# Patient Record
Sex: MALE | Race: WHITE | Employment: FULL TIME | URBAN - METROPOLITAN AREA
[De-identification: names, ages, dates, MRNs, and addresses within clinical notes are randomized per-mention and may not be internally consistent; named-entity substitution may affect disease eponyms.]

---

## 2018-12-20 ENCOUNTER — OFFICE VISIT (OUTPATIENT)
Dept: ORTHOPEDIC SURGERY | Age: 53
End: 2018-12-20
Payer: COMMERCIAL

## 2018-12-20 VITALS
HEIGHT: 71 IN | WEIGHT: 275 LBS | SYSTOLIC BLOOD PRESSURE: 75 MMHG | BODY MASS INDEX: 38.5 KG/M2 | DIASTOLIC BLOOD PRESSURE: 44 MMHG | HEART RATE: 93 BPM

## 2018-12-20 DIAGNOSIS — M25.561 CHRONIC PAIN OF RIGHT KNEE: Primary | ICD-10-CM

## 2018-12-20 DIAGNOSIS — G89.29 CHRONIC PAIN OF LEFT KNEE: ICD-10-CM

## 2018-12-20 DIAGNOSIS — M25.562 CHRONIC PAIN OF LEFT KNEE: ICD-10-CM

## 2018-12-20 DIAGNOSIS — G89.29 CHRONIC PAIN OF RIGHT KNEE: Primary | ICD-10-CM

## 2018-12-20 PROCEDURE — 99203 OFFICE O/P NEW LOW 30 MIN: CPT | Performed by: ORTHOPAEDIC SURGERY

## 2018-12-20 PROCEDURE — 20610 DRAIN/INJ JOINT/BURSA W/O US: CPT | Performed by: ORTHOPAEDIC SURGERY

## 2018-12-20 RX ORDER — HYDROCHLOROTHIAZIDE 25 MG/1
25 TABLET ORAL
Refills: 5 | COMMUNITY
Start: 2018-12-06

## 2018-12-20 RX ORDER — OMEPRAZOLE 40 MG/1
40 CAPSULE, DELAYED RELEASE ORAL DAILY
Refills: 5 | COMMUNITY
Start: 2018-12-06

## 2018-12-20 RX ORDER — LOSARTAN POTASSIUM 100 MG/1
100 TABLET ORAL DAILY
Refills: 0 | COMMUNITY
Start: 2018-11-05

## 2018-12-20 RX ORDER — ETODOLAC 400 MG/1
400 TABLET, FILM COATED ORAL 2 TIMES DAILY
Qty: 60 TABLET | Refills: 2 | Status: SHIPPED | OUTPATIENT
Start: 2018-12-20 | End: 2019-04-18 | Stop reason: SDUPTHER

## 2018-12-20 RX ORDER — ETODOLAC 400 MG/1
400 TABLET, FILM COATED ORAL 2 TIMES DAILY
COMMUNITY
End: 2018-12-20 | Stop reason: SDUPTHER

## 2018-12-20 RX ORDER — TRAMADOL HYDROCHLORIDE 50 MG/1
50 TABLET ORAL EVERY 6 HOURS PRN
Qty: 42 TABLET | Refills: 0 | Status: SHIPPED | OUTPATIENT
Start: 2018-12-20 | End: 2018-12-27

## 2018-12-20 RX ORDER — TRAMADOL HYDROCHLORIDE 50 MG/1
50 TABLET ORAL EVERY 6 HOURS PRN
COMMUNITY
End: 2018-12-20 | Stop reason: SDUPTHER

## 2019-04-18 DIAGNOSIS — M25.562 CHRONIC PAIN OF LEFT KNEE: ICD-10-CM

## 2019-04-18 DIAGNOSIS — M25.561 CHRONIC PAIN OF RIGHT KNEE: Primary | ICD-10-CM

## 2019-04-18 DIAGNOSIS — G89.29 CHRONIC PAIN OF LEFT KNEE: ICD-10-CM

## 2019-04-18 DIAGNOSIS — G89.29 CHRONIC PAIN OF RIGHT KNEE: Primary | ICD-10-CM

## 2019-04-18 RX ORDER — TRAMADOL HYDROCHLORIDE 50 MG/1
TABLET ORAL
Qty: 42 TABLET | Refills: 0 | Status: SHIPPED | OUTPATIENT
Start: 2019-04-18 | End: 2019-05-31 | Stop reason: SDUPTHER

## 2019-04-18 RX ORDER — ETODOLAC 400 MG/1
TABLET, FILM COATED ORAL
Qty: 60 TABLET | Refills: 0 | Status: SHIPPED | OUTPATIENT
Start: 2019-04-18 | End: 2019-05-17 | Stop reason: SDUPTHER

## 2019-05-20 RX ORDER — ETODOLAC 400 MG/1
TABLET, FILM COATED ORAL
Qty: 60 TABLET | Refills: 0 | Status: SHIPPED | OUTPATIENT
Start: 2019-05-20 | End: 2019-06-16 | Stop reason: SDUPTHER

## 2019-05-31 DIAGNOSIS — M25.561 CHRONIC PAIN OF RIGHT KNEE: ICD-10-CM

## 2019-05-31 DIAGNOSIS — G89.29 CHRONIC PAIN OF LEFT KNEE: ICD-10-CM

## 2019-05-31 DIAGNOSIS — G89.29 CHRONIC PAIN OF RIGHT KNEE: ICD-10-CM

## 2019-05-31 DIAGNOSIS — M25.562 CHRONIC PAIN OF LEFT KNEE: ICD-10-CM

## 2019-06-03 RX ORDER — TRAMADOL HYDROCHLORIDE 50 MG/1
TABLET ORAL
Qty: 42 TABLET | Refills: 0 | Status: SHIPPED | OUTPATIENT
Start: 2019-06-03 | End: 2019-11-27 | Stop reason: SDUPTHER

## 2019-06-17 RX ORDER — ETODOLAC 400 MG/1
TABLET, FILM COATED ORAL
Qty: 60 TABLET | Refills: 0 | Status: SHIPPED | OUTPATIENT
Start: 2019-06-17 | End: 2020-04-09

## 2019-07-10 ENCOUNTER — TELEPHONE (OUTPATIENT)
Dept: ORTHOPEDIC SURGERY | Age: 54
End: 2019-07-10

## 2019-07-23 ENCOUNTER — OFFICE VISIT (OUTPATIENT)
Dept: ORTHOPEDIC SURGERY | Age: 54
End: 2019-07-23
Payer: COMMERCIAL

## 2019-07-23 VITALS
DIASTOLIC BLOOD PRESSURE: 95 MMHG | BODY MASS INDEX: 38.49 KG/M2 | HEIGHT: 71 IN | WEIGHT: 274.91 LBS | SYSTOLIC BLOOD PRESSURE: 150 MMHG | HEART RATE: 79 BPM

## 2019-07-23 DIAGNOSIS — M25.562 CHRONIC PAIN OF LEFT KNEE: ICD-10-CM

## 2019-07-23 DIAGNOSIS — G89.29 CHRONIC PAIN OF RIGHT KNEE: Primary | ICD-10-CM

## 2019-07-23 DIAGNOSIS — M25.561 CHRONIC PAIN OF RIGHT KNEE: Primary | ICD-10-CM

## 2019-07-23 DIAGNOSIS — M76.61 ACHILLES BURSITIS OF RIGHT LOWER EXTREMITY: ICD-10-CM

## 2019-07-23 DIAGNOSIS — G89.29 CHRONIC PAIN OF LEFT KNEE: ICD-10-CM

## 2019-07-23 PROCEDURE — 99214 OFFICE O/P EST MOD 30 MIN: CPT | Performed by: ORTHOPAEDIC SURGERY

## 2019-07-23 PROCEDURE — 20610 DRAIN/INJ JOINT/BURSA W/O US: CPT | Performed by: ORTHOPAEDIC SURGERY

## 2019-07-23 RX ORDER — HYDROCODONE BITARTRATE AND ACETAMINOPHEN 5; 325 MG/1; MG/1
1 TABLET ORAL EVERY 6 HOURS PRN
Qty: 28 TABLET | Refills: 0 | Status: SHIPPED | OUTPATIENT
Start: 2019-07-23 | End: 2019-07-30

## 2019-07-23 NOTE — PROGRESS NOTES
Patient Name: Matthew Fuller  : 1965  DOS: 2019        Chief Complaint:   Chief Complaint   Patient presents with    Knee Pain     Bilateral knee   Currently:     Pain in the bialteral knees with return on doing his 15,000 step shows. Flare up in the past several weeks but decreased currently. Pain better now. Limited relief with medications including lodine and ultram.   Achilles tendintis with fasciits using a boot currently. Previously:          History of Present Illness:  Matthew Fuller is a 46 y.o. male who presents with a chief complaint of continued complaints of pain in the knee with irritation with walking, stairs and with overuse. Pain in the knee regularly with swelling and discomfort. Increase in pain over the past several years time. Patient notes that he has had long-standing issues with bilateral knees and he has seen Dr. Glenn Rodriguez for for many years. He states that he is here for multiple reasons one being he has not had many of his prescriptions for a period of time since is not seen Dr. Glenn Rodriguez in over a year. He notes that he was on a prescription for tramadol and Lodine that he would like to have refilled. He also notes that he would like to have a new prescription for handicap placard for main use at the airports since he does a lot of traveling for work and a long distance walking in the airport is very difficult for him. He denies use of braces crutches or other assistive walking devices he admits to use of Aleve on an as-needed basis. He states that he has used the Euflexxa injections as well as corticosteroid injections but has not had either in over a year. One like to do a set of the cortisone injections today. States that both knees are about a 3 out of 10 up for pain right being slightly greater than the left if he had to choose. Notes that most of his pain is on the medial side of his knee versus lateral side.   Denies the knees giving out dry. She is not diaphoretic. ____  Psychiatric: Mood, affect and judgment normal. ______          Assessment   Vital Signs:      Vitals:    07/23/19 1059   BP: (!) 150/95   Pulse:        bilateral Knee shows evidence for DJD with mild medial obvious pseudolaxity, pain with weight bearing, antalgic gait and palpable osteophytes. Inspection: Mild anterior swelling. Swelling is present with  mild effusion. The posterior aspect of the knee appears to be full with tenderness. There is no erythema, rash, or ecchymosis. Range of Motion:  Right 3-120 left 3-120     Pain with valgus testing    There is mild valgus deformity    Strength:  Hamstrings rated: 5/5 with noted significant tightness. Quadriceps rated: 5/5    Palpation: There is moderate tenderness along the medial joint line. Special Tests: Patellar Compression test is positive. Valgus & Varus test is positive. Skin: There are no rashes, ulcerations or lesions. Gait: Gait pattern is antalgic  Skin shows no rashes/ecchymosis to the affected area, no hyperesthesias, no discoloration, no temperature or color discrepancies. NEUROLOGICALLY: There is no evidence for sensory or motor deficits in the extremity. Coordination appears full with no spacticity or rigidity. Reflexes appear to be symmetric. Distal circulation intact. No signs of RSD. Additional Comments:more pain at the joint and the distal femoral joint line rather than the area of the anterior tibial bone. More defomrity in right knee than the left. Procedure(s):   Injection Procedure Note  Procedure Details     Verbal consent was obtained for the procedure. The right knee(s) was prepped with iodine and the skin was anesthetized. Using a 22 gauge needle the right knee(s) joint is injected with 2 ml 1% lidocaine and 2 ml of triamcinolone (KENALOG) 40mg/ml under the lateral aspect of the knee.  The injection site was cleansed with topical isopropyl alcohol and a dressing

## 2019-11-27 ENCOUNTER — OFFICE VISIT (OUTPATIENT)
Dept: ORTHOPEDIC SURGERY | Age: 54
End: 2019-11-27
Payer: COMMERCIAL

## 2019-11-27 VITALS
HEART RATE: 100 BPM | SYSTOLIC BLOOD PRESSURE: 135 MMHG | WEIGHT: 274.91 LBS | BODY MASS INDEX: 38.49 KG/M2 | DIASTOLIC BLOOD PRESSURE: 92 MMHG | HEIGHT: 71 IN

## 2019-11-27 DIAGNOSIS — M25.562 CHRONIC PAIN OF LEFT KNEE: ICD-10-CM

## 2019-11-27 DIAGNOSIS — G89.29 CHRONIC PAIN OF RIGHT KNEE: Primary | ICD-10-CM

## 2019-11-27 DIAGNOSIS — G89.29 CHRONIC PAIN OF LEFT KNEE: ICD-10-CM

## 2019-11-27 DIAGNOSIS — M25.561 CHRONIC PAIN OF RIGHT KNEE: Primary | ICD-10-CM

## 2019-11-27 PROCEDURE — 99214 OFFICE O/P EST MOD 30 MIN: CPT | Performed by: ORTHOPAEDIC SURGERY

## 2019-11-27 RX ORDER — TRAMADOL HYDROCHLORIDE 50 MG/1
50 TABLET ORAL EVERY 6 HOURS PRN
Qty: 42 TABLET | Refills: 0 | Status: SHIPPED | OUTPATIENT
Start: 2019-11-27 | End: 2019-12-04

## 2019-11-27 RX ORDER — HYDROCODONE BITARTRATE AND ACETAMINOPHEN 5; 325 MG/1; MG/1
1 TABLET ORAL EVERY 4 HOURS PRN
Qty: 30 TABLET | Refills: 0 | Status: SHIPPED | OUTPATIENT
Start: 2019-11-27 | End: 2019-12-02

## 2019-11-27 RX ORDER — PREDNISONE 10 MG/1
10 TABLET ORAL DAILY
Qty: 10 TABLET | Refills: 0 | Status: SHIPPED | OUTPATIENT
Start: 2019-11-27 | End: 2019-12-07

## 2019-11-27 RX ORDER — AMLODIPINE BESYLATE 2.5 MG/1
2.5 TABLET ORAL DAILY
Refills: 1 | COMMUNITY
Start: 2019-10-16

## 2019-12-26 ENCOUNTER — TELEPHONE (OUTPATIENT)
Dept: ORTHOPEDIC SURGERY | Age: 54
End: 2019-12-26

## 2019-12-26 DIAGNOSIS — M25.562 CHRONIC PAIN OF LEFT KNEE: ICD-10-CM

## 2019-12-26 DIAGNOSIS — G89.29 CHRONIC PAIN OF LEFT KNEE: ICD-10-CM

## 2019-12-26 DIAGNOSIS — G89.29 CHRONIC PAIN OF RIGHT KNEE: Primary | ICD-10-CM

## 2019-12-26 DIAGNOSIS — M25.561 CHRONIC PAIN OF RIGHT KNEE: Primary | ICD-10-CM

## 2019-12-28 RX ORDER — TRAMADOL HYDROCHLORIDE 50 MG/1
50 TABLET ORAL EVERY 4 HOURS PRN
Qty: 42 TABLET | Refills: 0 | Status: SHIPPED | OUTPATIENT
Start: 2019-12-28 | End: 2020-02-17

## 2020-01-02 ENCOUNTER — TELEPHONE (OUTPATIENT)
Dept: ORTHOPEDIC SURGERY | Age: 55
End: 2020-01-02

## 2020-01-02 RX ORDER — HYDROCODONE BITARTRATE AND ACETAMINOPHEN 5; 325 MG/1; MG/1
1 TABLET ORAL EVERY 6 HOURS PRN
Qty: 28 TABLET | Refills: 0 | Status: SHIPPED | OUTPATIENT
Start: 2020-01-02 | End: 2020-04-16 | Stop reason: SDUPTHER

## 2020-01-03 ENCOUNTER — TELEPHONE (OUTPATIENT)
Dept: ORTHOPEDIC SURGERY | Age: 55
End: 2020-01-03

## 2020-01-09 ENCOUNTER — TELEPHONE (OUTPATIENT)
Dept: ORTHOPEDIC SURGERY | Age: 55
End: 2020-01-09

## 2020-02-04 ENCOUNTER — OFFICE VISIT (OUTPATIENT)
Dept: ORTHOPEDIC SURGERY | Age: 55
End: 2020-02-04
Payer: COMMERCIAL

## 2020-02-04 VITALS
BODY MASS INDEX: 38.49 KG/M2 | HEIGHT: 71 IN | DIASTOLIC BLOOD PRESSURE: 95 MMHG | WEIGHT: 274.91 LBS | SYSTOLIC BLOOD PRESSURE: 133 MMHG | HEART RATE: 104 BPM

## 2020-02-04 PROCEDURE — 99214 OFFICE O/P EST MOD 30 MIN: CPT | Performed by: ORTHOPAEDIC SURGERY

## 2020-02-04 NOTE — PROGRESS NOTES
atraumatic. ____  Eyes: Conjunctivae are normal. ________  Cardiovascular: Intact distal pulses. ____  Pulmonary/Chest: Effort normal. ________________________  Neurological: She is alert and oriented to person, place, and time. ____  Skin: Skin is dry. She is not diaphoretic. ____  Psychiatric: Mood, affect and judgment normal. ______          Assessment   Vital Signs:      Vitals:    02/04/20 0957   BP: (!) 133/95   Pulse:        bilateral Knee shows evidence for DJD with mild medial obvious pseudolaxity, pain with weight bearing, antalgic gait and palpable osteophytes. Inspection: Mild anterior swelling. Swelling is present with  mild effusion. The posterior aspect of the knee appears to be full with tenderness more severe tightness on the right side but fullness bilaterally. Obvious Barker's cyst is palpated on the right medially   There is no erythema, rash, or ecchymosis. Range of Motion:  Right 3-120 left 3-120     Pain with varus testing bilaterally    There is mild varus deformity worse on the right side    Strength:  Hamstrings rated: 5/5 with noted significant tightness. Quadriceps rated: 5/5    Palpation: There is moderate tenderness along the medial joint line bilaterally  Special Tests: Patellar Compression test is positive more on the right side than the left. Valgus & Varus test is positive. Skin: There are no rashes, ulcerations or lesions. Gait: Gait pattern is antalgic  Skin shows no rashes/ecchymosis to the affected area, no hyperesthesias, no discoloration, no temperature or color discrepancies. NEUROLOGICALLY: There is no evidence for sensory or motor deficits in the extremity. Coordination appears full with no spacticity or rigidity. Reflexes appear to be symmetric. Distal circulation intact. No signs of RSD. Additional Comments:more pain at the joint and the distal femoral joint line rather than the area of the anterior tibial bone.    More defomrity in right knee than the left. Diagnostic Test Findings:   Xray     2/4/20  X-rays obtained today 4 views AP PA lateral and sunrise shows evidence of progressive osteoarthritis patellofemoral joints show mild osteoarthritis severe osteoarthritis in the medial joint in both knees lateral joint is mild  Have reviewed the xrays above from previous year  and my impression is: 4 view x-ray of the right knee AP, lateral, sunrise and tunnel views performed and reviewed today revealing moderate bicompartmental osteoarthritic changes noted to the medial and patellar compartments with mild changes noted to the lateral compartment no obvious fracture dislocation noted. 4 view x-ray of the left knee AP, lateral, sunrise and tunnel views performed and reviewed today revealing moderate bicompartmental osteoarthritic changes noted to the medial and patellar compartments with mild changes noted to the lateral compartment no obvious fracture or dislocation noted. Assessment and Plan:       Diagnosis Orders   1. Chronic pain of right knee  XR KNEE RIGHT (MIN 4 VIEWS)   2.  Chronic pain of left knee  XR KNEE LEFT (MIN 4 VIEWS)        The orders below, if any, were placed during this visit:   Orders Placed This Encounter   Procedures    XR KNEE LEFT (MIN 4 VIEWS)     Standing Status:   Future     Number of Occurrences:   1     Standing Expiration Date:   3/4/2020     Order Specific Question:   Reason for exam:     Answer:   Pain RM 7    XR KNEE RIGHT (MIN 4 VIEWS)     Standing Status:   Future     Number of Occurrences:   1     Standing Expiration Date:   3/4/2020     Order Specific Question:   Reason for exam:     Answer:   Pain RM 7              Treatment Plan:        -gave patient information on over-the-counter anti-inflammatory supplementation such as tart cherry juice and tumeric        -advise  patient to start at home exercise regimen to help with strengthening and loosening of hamstrings and quadriceps are also core musculature. additional possibility for stem cell chondroplasty as needed for th left knee. Could benefit from bicompartmental replacement. In view of the fact that non operative measures have not been successful in relieving pain, Total Knee Replacement was recommended. The procedure, risks and expected outcome were explained, as well as the need for physical therapy after surgery. The patient has decided to have the surgery. The surgery will be scheduled in the coming weeks after medical clearance has been received. All questions were answered.        Chaz Butterfield MD

## 2020-02-14 ENCOUNTER — TELEPHONE (OUTPATIENT)
Dept: ORTHOPEDIC SURGERY | Age: 55
End: 2020-02-14

## 2020-02-17 RX ORDER — TRAMADOL HYDROCHLORIDE 50 MG/1
TABLET ORAL
Qty: 42 TABLET | Refills: 0 | Status: SHIPPED | OUTPATIENT
Start: 2020-02-17 | End: 2020-04-07

## 2020-02-24 ENCOUNTER — TELEPHONE (OUTPATIENT)
Dept: ORTHOPEDIC SURGERY | Age: 55
End: 2020-02-24

## 2020-02-25 NOTE — TELEPHONE ENCOUNTER
Spoke with patient. He is aware that we will order an MRI. Once the MRI is done we will go over the results and the next steps for his treatment.

## 2020-02-28 NOTE — PROGRESS NOTES
[facility] / [facility] ([facility]) [address] [facility], [facility] [facility] [address]    Acknowledgment of Informed Consent for Surgical or Medical Procedure and Sedation  I agree to allow doctor(s) МАРИНА EVANS and his/her associates or assistants, including residents and/or other qualified medical practitioner to perform the following medical treatment or procedure and to administer or direct the administration of sedation as necessary:  Procedure(s): RIGHT KNEE ARTHROPLASTY  My doctor has explained the following regarding the proposed procedure:   the explanation of the procedure   the benefits of the procedure   the potential problems that might occur during recuperation   the risks and side effects of the procedure which could include but are not limited to severe blood loss, infection, stroke or death   the benefits, risks and side effect of alternative procedures including the consequences of declining this procedure or any alternative procedures   the likelihood of achieving satisfactory results. I acknowledge no guarantee or assurance has been made to me regarding the results. I understand that during the course of this treatment/procedure, unforeseen conditions can occur which require an additional or different procedure. I agree to allow my physician or assistants to perform such extension of the original procedure as they may find necessary. I understand that sedation will often result in temporary impairment of memory and fine motor skills and that sedation can occasionally progress to a state of deep sedation or general anesthesia. I understand the risks of anesthesia for surgery include, but are not limited to, sore throat, hoarseness, injury to face, mouth, or teeth; nausea; headache; injury to blood vessels or nerves; death, brain damage, or paralysis.     I understand that if I have a Limitation of Treatment order in effect during my hospitalization, the order may or may not be in effect during this procedure. I give my doctor permission to give me blood or blood products. I understand that there are risks with receiving blood such as hepatitis, AIDS, fever, or allergic reaction. I acknowledge that the risks, benefits, and alternatives of this treatment have been explained to me and that no express or implied warranty has been given by the hospital, any blood bank, or any person or entity as to the blood or blood components transfused. At the discretion of my doctor, I agree to allow observers, equipment/product representatives and allow photographing, and/or televising of the procedure, provided my name or identity is maintained confidentially. I agree the hospital may dispose of or use for scientific or educational purposes any tissue, fluid, or body parts which may be removed.     ________________________________Date________Time______ am/pm  ([place])  Patient or Signature of Closest Relative or Legal Guardian    ________________________________Date________Time______am/pm      Page 1 of  1  Witness

## 2020-03-11 ENCOUNTER — HOSPITAL ENCOUNTER (OUTPATIENT)
Dept: PREADMISSION TESTING | Age: 55
Discharge: HOME | End: 2020-03-15
Payer: COMMERCIAL

## 2020-03-11 VITALS
BODY MASS INDEX: 38.92 KG/M2 | SYSTOLIC BLOOD PRESSURE: 147 MMHG | HEART RATE: 76 BPM | HEIGHT: 71 IN | TEMPERATURE: 97.8 F | RESPIRATION RATE: 20 BRPM | OXYGEN SATURATION: 96 % | DIASTOLIC BLOOD PRESSURE: 92 MMHG | WEIGHT: 278 LBS

## 2020-03-11 LAB
ABO/RH: NORMAL
ANION GAP SERPL CALCULATED.3IONS-SCNC: 14 MMOL/L (ref 3–16)
ANTIBODY SCREEN: NORMAL
APTT: 30.3 SEC (ref 24.2–36.2)
BASOPHILS ABSOLUTE: 0.1 K/UL (ref 0–0.2)
BASOPHILS RELATIVE PERCENT: 1 %
BUN BLDV-MCNC: 21 MG/DL (ref 7–20)
C-REACTIVE PROTEIN: 3.5 MG/L (ref 0–5.1)
CALCIUM SERPL-MCNC: 9.1 MG/DL (ref 8.3–10.6)
CHLORIDE BLD-SCNC: 102 MMOL/L (ref 99–110)
CO2: 24 MMOL/L (ref 21–32)
CREAT SERPL-MCNC: 1 MG/DL (ref 0.9–1.3)
EOSINOPHILS ABSOLUTE: 0.2 K/UL (ref 0–0.6)
EOSINOPHILS RELATIVE PERCENT: 1.3 %
GFR AFRICAN AMERICAN: >60
GFR NON-AFRICAN AMERICAN: >60
GLUCOSE BLD-MCNC: 137 MG/DL (ref 70–99)
HCT VFR BLD CALC: 44.6 % (ref 40.5–52.5)
HEMOGLOBIN: 14.9 G/DL (ref 13.5–17.5)
INR BLD: 1.04 (ref 0.86–1.14)
LYMPHOCYTES ABSOLUTE: 2.5 K/UL (ref 1–5.1)
LYMPHOCYTES RELATIVE PERCENT: 19.7 %
MCH RBC QN AUTO: 29.4 PG (ref 26–34)
MCHC RBC AUTO-ENTMCNC: 33.4 G/DL (ref 31–36)
MCV RBC AUTO: 88 FL (ref 80–100)
MONOCYTES ABSOLUTE: 0.9 K/UL (ref 0–1.3)
MONOCYTES RELATIVE PERCENT: 7.4 %
MRSA SCREEN RT-PCR: NORMAL
NEUTROPHILS ABSOLUTE: 8.9 K/UL (ref 1.7–7.7)
NEUTROPHILS RELATIVE PERCENT: 70.6 %
PDW BLD-RTO: 13.2 % (ref 12.4–15.4)
PLATELET # BLD: 317 K/UL (ref 135–450)
PMV BLD AUTO: 6.9 FL (ref 5–10.5)
POTASSIUM SERPL-SCNC: 3.8 MMOL/L (ref 3.5–5.1)
PROTHROMBIN TIME: 12.1 SEC (ref 10–13.2)
RBC # BLD: 5.07 M/UL (ref 4.2–5.9)
SEDIMENTATION RATE, ERYTHROCYTE: 6 MM/HR (ref 0–20)
SODIUM BLD-SCNC: 140 MMOL/L (ref 136–145)
WBC # BLD: 12.5 K/UL (ref 4–11)

## 2020-03-11 PROCEDURE — 86901 BLOOD TYPING SEROLOGIC RH(D): CPT

## 2020-03-11 PROCEDURE — 86900 BLOOD TYPING SEROLOGIC ABO: CPT

## 2020-03-11 PROCEDURE — 86850 RBC ANTIBODY SCREEN: CPT

## 2020-03-11 PROCEDURE — 85652 RBC SED RATE AUTOMATED: CPT

## 2020-03-11 PROCEDURE — 86140 C-REACTIVE PROTEIN: CPT

## 2020-03-11 PROCEDURE — 80048 BASIC METABOLIC PNL TOTAL CA: CPT

## 2020-03-11 PROCEDURE — 87641 MR-STAPH DNA AMP PROBE: CPT

## 2020-03-11 PROCEDURE — 85025 COMPLETE CBC W/AUTO DIFF WBC: CPT

## 2020-03-11 PROCEDURE — 85610 PROTHROMBIN TIME: CPT

## 2020-03-11 PROCEDURE — 85730 THROMBOPLASTIN TIME PARTIAL: CPT

## 2020-03-11 RX ORDER — ASPIRIN 81 MG/1
81 TABLET ORAL DAILY
Status: ON HOLD | COMMUNITY
End: 2020-06-16 | Stop reason: HOSPADM

## 2020-03-11 NOTE — PROGRESS NOTES
[address] [facility]                          Date of Procedure  PRIOR TO PROCEDURE DATE:  1. Please follow any guidelines/instructions prior to your procedure as advised by your surgeon. 2. Arrange for someone to drive you home and be with you for the first 24 hours after discharge for your safety after your procedure for which you received sedation. Ensure it is someone we can share information with regarding your discharge. 3. You must contact your surgeon for instructions IF:   You are taking any blood thinners, aspirin, anti-inflammatory or vitamin E.   There is a change in your physical condition such as a cold, fever, rash, cuts, sores or any other infection, especially near your surgical site. 4. Do not drink alcohol the day before or day of your procedure. 5. A Pre-op History and Physical for surgery MUST be completed by your Physician or Urgent Care within 30 days of your procedure date. Please bring a copy with you on the day of your procedure and along with any other testing performed. THE DAY OF YOUR PROCEDURE:  1. Follow instructions for ARRIVAL TIME as DIRECTED BY YOUR SURGEON. 2. Enter the MAIN entrance from [address] and follow the signs to the free [employer] or [employer] parking (offered free of charge 6am-5pm). 3. Enter the Main Entrance of the hospital (do NOT enter from the lower level of the parking garage). Upon entrance, check in with the  at the main desk on your left. If no one is available at the desk, proceed into the [facility] Waiting Room and go through the door directly into the [facility]. There is a Check-in desk ACROSS from Room 5 (marked with a sign hanging from the ceiling). The phone number for the surgery center is [phone].    4. DO NOT EAT ANYTHING eight hours prior to surgery.   May have 8 ounces of water 4 hours prior to surgery (exception would be medication instructions below discharge. 3. You and your family will be given written instructions about your diet, activity, dressing care, medications, and return visits. 4. Once at home, should issues with nausea, pain, or bleeding occur, or should you notice any signs of infection, you should call your surgeon. 5. Always clean your hands before and after caring for your wound. Do not let your family touch your surgery site without cleaning their hands. 6. Narcotic pain medications can cause significant constipation. You may want to add a stool softener to your postoperative medication schedule or speak to your surgeon on how best to manage this SIDE EFFECT. SPECIAL INSTRUCTIONS     Thank you for allowing us to care for you. We strive to exceed your expectations in the overall delivery of care and service provided to you and your family. If you need to contact us for any reason, please call us at [phone]. Instructions reviewed and copy given to patient during preadmission testing visit. Kamlesh Haider. 3/11/2020 .10:14 AM      ADDITIONAL EDUCATIONAL INFORMATION REVIEWED / PROVIDED TO YOU AND YOUR FAMILY:  Yes Taking Control of Your Pain   Yes FAQs about Surgical Site Infections    Yes Willard® Wipes Bathing Instructions (Obtained from:  [email or web address]://www.[employer]/. pdf )  Yes Hibiclens® Bathing Instructions      Yes Incentive Spirometer given to patient- PLEASE BRING THIS SPIROMETER BACK WITH YOU ON THE DAY OF YOUR SURGERY      Yes Your Guide to Knee Replacement Surgery. PLEASE BRING THIS BOOKLET BACK ON THE DAY OF YOUR SURGERY.   Yes  Reviewed/Given handout for [employer]/Class

## 2020-03-11 NOTE — PROGRESS NOTES
Snoring? Do you snore loudly (loud enough to be heard through closed doors, or your bed partner elbows you for snoring at night)? No    Tired? Do you often feel tired, fatigued, or sleepy during the daytime (such as falling asleep during driving)? No    Observed? Has anyone observed you stop breathing or choking/gasping during your sleep? Yes    Pressure? Do you have or are being treated for high blood pressure? No    Neck Size? (measured around Ozs apple)  For male, is your shirt collar 17 inches or larger? For female, is your shirt collar 16 inches or larger? Yes    Age older than 48years old? Yes    Gender = Male  Yes    Body Mass Index more than 35 kg/m2?   Yes    Risk of MARIEL Scoring criteria:    [] Low risk:  Yes to 0 - 2 questions    [x] Intermediate risk:  Yes to 3 - 4 questions    [] High risk:  Yes to 5 - 8 questions

## 2020-03-12 ENCOUNTER — TELEPHONE (OUTPATIENT)
Dept: ORTHOPEDIC SURGERY | Age: 55
End: 2020-03-12

## 2020-03-12 RX ORDER — CELECOXIB 200 MG/1
400 CAPSULE ORAL ONCE
Status: CANCELLED | OUTPATIENT
Start: 2020-03-12 | End: 2020-03-12

## 2020-03-12 RX ORDER — PREGABALIN 150 MG/1
150 CAPSULE ORAL ONCE
Status: CANCELLED | OUTPATIENT
Start: 2020-03-12 | End: 2020-03-12

## 2020-03-12 RX ORDER — OXYCODONE HCL 10 MG/1
20 TABLET, FILM COATED, EXTENDED RELEASE ORAL ONCE
Status: CANCELLED | OUTPATIENT
Start: 2020-03-12 | End: 2020-03-12

## 2020-03-12 RX ORDER — DEXAMETHASONE SODIUM PHOSPHATE 4 MG/ML
10 INJECTION, SOLUTION INTRA-ARTICULAR; INTRALESIONAL; INTRAMUSCULAR; INTRAVENOUS; SOFT TISSUE ONCE
Status: CANCELLED | OUTPATIENT
Start: 2020-03-12 | End: 2020-03-12

## 2020-03-16 ENCOUNTER — TELEPHONE (OUTPATIENT)
Dept: ORTHOPEDIC SURGERY | Age: 55
End: 2020-03-16

## 2020-03-16 NOTE — TELEPHONE ENCOUNTER
Spoke with patient. She is aware that currently we have not been told to cancel surgeries. But if that does happen we will let him know.

## 2020-03-17 ENCOUNTER — TELEPHONE (OUTPATIENT)
Dept: ORTHOPEDIC SURGERY | Age: 55
End: 2020-03-17

## 2020-03-25 ENCOUNTER — TELEPHONE (OUTPATIENT)
Dept: ORTHOPEDIC SURGERY | Age: 55
End: 2020-03-25

## 2020-04-07 RX ORDER — TRAMADOL HYDROCHLORIDE 50 MG/1
TABLET ORAL
Qty: 42 TABLET | Refills: 0 | Status: SHIPPED | OUTPATIENT
Start: 2020-04-07 | End: 2020-04-14

## 2020-04-09 RX ORDER — ETODOLAC 400 MG/1
TABLET, FILM COATED ORAL
Qty: 60 TABLET | Refills: 0 | Status: SHIPPED | OUTPATIENT
Start: 2020-04-09 | End: 2020-05-06

## 2020-04-14 ENCOUNTER — TELEPHONE (OUTPATIENT)
Dept: ORTHOPEDIC SURGERY | Age: 55
End: 2020-04-14

## 2020-04-16 ENCOUNTER — TELEPHONE (OUTPATIENT)
Dept: ORTHOPEDIC SURGERY | Age: 55
End: 2020-04-16

## 2020-04-16 ENCOUNTER — VIRTUAL VISIT (OUTPATIENT)
Dept: ORTHOPEDIC SURGERY | Age: 55
End: 2020-04-16
Payer: COMMERCIAL

## 2020-04-16 PROCEDURE — 99442 PR PHYS/QHP TELEPHONE EVALUATION 11-20 MIN: CPT | Performed by: ORTHOPAEDIC SURGERY

## 2020-04-16 RX ORDER — HYDROCODONE BITARTRATE AND ACETAMINOPHEN 5; 325 MG/1; MG/1
1 TABLET ORAL EVERY 6 HOURS PRN
Qty: 40 TABLET | Refills: 0 | Status: SHIPPED | OUTPATIENT
Start: 2020-04-16 | End: 2020-04-16 | Stop reason: SDUPTHER

## 2020-04-16 RX ORDER — HYDROCODONE BITARTRATE AND ACETAMINOPHEN 5; 325 MG/1; MG/1
1 TABLET ORAL
Qty: 40 TABLET | Refills: 0 | Status: SHIPPED | OUTPATIENT
Start: 2020-04-16 | End: 2020-04-23

## 2020-04-16 NOTE — TELEPHONE ENCOUNTER
04/16/2020  EUFLEXXA  (SERIES OF 3)  RIGHT KNEE. DENIED  NO COVERAGE - CASH BASED PROGRAM. PER LEADERSHIP.  NOT A COVERED BENEFIT FOR THIS SELF FUNDED BCBS OF [facility], PER NOTES FOR THIS GROUP .  AP

## 2020-04-21 ENCOUNTER — TELEPHONE (OUTPATIENT)
Dept: ORTHOPEDIC SURGERY | Age: 55
End: 2020-04-21

## 2020-04-29 ENCOUNTER — TELEPHONE (OUTPATIENT)
Dept: ORTHOPEDIC SURGERY | Age: 55
End: 2020-04-29

## 2020-04-29 NOTE — TELEPHONE ENCOUNTER
I called the patient and let him know that we are still not scheduling surgery that is inpatient at this time.

## 2020-05-05 ENCOUNTER — TELEPHONE (OUTPATIENT)
Dept: ORTHOPEDIC SURGERY | Age: 55
End: 2020-05-05

## 2020-05-05 PROBLEM — S83.242A TEAR OF MEDIAL MENISCUS OF LEFT KNEE, CURRENT: Status: ACTIVE | Noted: 2020-05-05

## 2020-05-05 PROBLEM — S82.143G CLOSED FRACTURE OF TIBIAL PLATEAU WITH DELAYED HEALING: Status: ACTIVE | Noted: 2020-05-05

## 2020-05-06 RX ORDER — ETODOLAC 400 MG/1
TABLET, FILM COATED ORAL
Qty: 60 TABLET | Refills: 0 | Status: ON HOLD | OUTPATIENT
Start: 2020-05-06 | End: 2020-06-16 | Stop reason: HOSPADM

## 2020-05-15 ENCOUNTER — TELEPHONE (OUTPATIENT)
Dept: ORTHOPEDIC SURGERY | Age: 55
End: 2020-05-15

## 2020-05-28 ENCOUNTER — TELEPHONE (OUTPATIENT)
Dept: ORTHOPEDIC SURGERY | Age: 55
End: 2020-05-28

## 2020-06-01 ENCOUNTER — TELEPHONE (OUTPATIENT)
Dept: ORTHOPEDIC SURGERY | Age: 55
End: 2020-06-01

## 2020-06-01 RX ORDER — HYDROCODONE BITARTRATE AND ACETAMINOPHEN 5; 325 MG/1; MG/1
1 TABLET ORAL EVERY 6 HOURS PRN
Qty: 28 TABLET | Refills: 0 | Status: SHIPPED | OUTPATIENT
Start: 2020-06-01 | End: 2020-06-08

## 2020-06-01 RX ORDER — TRAMADOL HYDROCHLORIDE 50 MG/1
TABLET ORAL
Qty: 42 TABLET | Refills: 0 | Status: SHIPPED | OUTPATIENT
Start: 2020-06-01 | End: 2020-06-08

## 2020-06-02 ENCOUNTER — TELEPHONE (OUTPATIENT)
Dept: ORTHOPEDIC SURGERY | Age: 55
End: 2020-06-02

## 2020-06-02 NOTE — TELEPHONE ENCOUNTER
Patient called in with questions regarding his upcoming surgery. Patient would like a call back at [phone]    Thanks!

## 2020-06-09 ENCOUNTER — OFFICE VISIT (OUTPATIENT)
Dept: PRIMARY CARE CLINIC | Age: 55
End: 2020-06-09

## 2020-06-09 LAB
REASON FOR REJECTION: NORMAL
REJECTED TEST: NORMAL

## 2020-06-09 RX ORDER — HYDROCODONE BITARTRATE AND ACETAMINOPHEN 5; 325 MG/1; MG/1
1 TABLET ORAL EVERY 6 HOURS PRN
Status: ON HOLD | COMMUNITY
End: 2020-06-16 | Stop reason: HOSPADM

## 2020-06-09 RX ORDER — CETIRIZINE HYDROCHLORIDE 10 MG/1
10 TABLET ORAL DAILY
COMMUNITY

## 2020-06-09 RX ORDER — TRAMADOL HYDROCHLORIDE 50 MG/1
50 TABLET ORAL 2 TIMES DAILY PRN
Status: ON HOLD | COMMUNITY
End: 2020-06-16 | Stop reason: HOSPADM

## 2020-06-09 NOTE — RESULT ENCOUNTER NOTE
There is an issue with the specimen. If specimen is requested, please contact patient for a new sample.

## 2020-06-09 NOTE — PROGRESS NOTES
registered at your bedside once brought back to your room. 5. DO NOT EAT ANYTHING eight hours prior to surgery. May have 8 ounces of water 4 hours prior to surgery. 6. MEDICATIONS    Take the following medications with a SMALL sip of water: OMEPARAZOLE AND AMLODIPINE    Use your usual dose of inhalers the morning of surgery. BRING your rescue inhaler with you to hospital.    Anesthesia does NOT want you to take insulin the morning of surgery. They will control your blood sugar while you are at the hospital. Please contact your ordering physician for instructions regarding your insulin the night before your procedure. If you have an insulin pump, please keep it set on basal rate. 7. Do not swallow water when brushing teeth. No gum, candy, mints or ice chips. Refrain from smoking or at least decrease the amount. 8. Dress in loose, comfortable clothing appropriate for redressing after your procedure. Do not wear jewelry (including body piercings), make-up (especially NO eye make-up), fingernail polish (NO toenail polish if foot/leg surgery), lotion, powders or metal hairclips. 9. Dentures, glasses, or contacts will need to be removed before your procedure. Bring cases for your glasses, contacts, dentures, or hearing aids to protect them while you are in surgery. 10. If you use a CPAP, please bring it with you on the day of your procedure. 11. We recommend that valuable personal  belongings such as cash, cell phones, e-tablets or jewelry, be left at home during your stay. The hospital will not be responsible for valuables that are not secured in the hospital safe. However, if your insurance requires a co-pay, you may want to bring a method of payment, i.e. Check or credit card, if you wish to pay your co-pay the day of surgery. 12. If you are to stay overnight, you may bring a bag with personal items.  Please have any large items you may need brought in by your family after your arrival

## 2020-06-09 NOTE — PROGRESS NOTES
Patient had PAT on 3/11 for originally scheduled surgery date in March. Patient stated that surgeon office stated only H&P needed to be repeated , which was done on 6/1/20. [facility] for Nilda Pete, at Dr. Lu Necessary notifying that all testing from 3/11/20 will be used for 6/15/20 sx. . If any concerns, please call PAT.

## 2020-06-11 LAB
SARS-COV-2: NOT DETECTED
SOURCE: NORMAL

## 2020-06-12 ENCOUNTER — HOSPITAL ENCOUNTER (OUTPATIENT)
Dept: CT IMAGING | Age: 55
Discharge: HOME | End: 2020-06-12
Payer: COMMERCIAL

## 2020-06-12 ENCOUNTER — ANESTHESIA EVENT (OUTPATIENT)
Dept: OPERATING ROOM | Age: 55
End: 2020-06-12
Payer: COMMERCIAL

## 2020-06-12 PROCEDURE — 73700 CT LOWER EXTREMITY W/O DYE: CPT

## 2020-06-15 ENCOUNTER — APPOINTMENT (OUTPATIENT)
Dept: GENERAL RADIOLOGY | Age: 55
End: 2020-06-15
Attending: ORTHOPAEDIC SURGERY
Payer: COMMERCIAL

## 2020-06-15 ENCOUNTER — HOSPITAL ENCOUNTER (OUTPATIENT)
Age: 55
Setting detail: OBSERVATION
Discharge: HOME | End: 2020-06-16
Attending: ORTHOPAEDIC SURGERY | Admitting: ORTHOPAEDIC SURGERY
Payer: COMMERCIAL

## 2020-06-15 ENCOUNTER — ANESTHESIA (OUTPATIENT)
Dept: OPERATING ROOM | Age: 55
End: 2020-06-15
Payer: COMMERCIAL

## 2020-06-15 VITALS — TEMPERATURE: 99.7 F | OXYGEN SATURATION: 97 % | DIASTOLIC BLOOD PRESSURE: 64 MMHG | SYSTOLIC BLOOD PRESSURE: 132 MMHG

## 2020-06-15 PROBLEM — Z96.651 STATUS POST TOTAL KNEE REPLACEMENT, RIGHT: Status: ACTIVE | Noted: 2020-06-15

## 2020-06-15 LAB
ABO/RH: NORMAL
ANTIBODY SCREEN: NORMAL
GLUCOSE BLD-MCNC: 146 MG/DL (ref 70–99)
PERFORMED ON: ABNORMAL

## 2020-06-15 PROCEDURE — 27447 TOTAL KNEE ARTHROPLASTY: CPT | Performed by: PHYSICIAN ASSISTANT

## 2020-06-15 PROCEDURE — 6360000002 HC RX W HCPCS: Performed by: ORTHOPAEDIC SURGERY

## 2020-06-15 PROCEDURE — 7100000000 HC PACU RECOVERY - FIRST 15 MIN: Performed by: ORTHOPAEDIC SURGERY

## 2020-06-15 PROCEDURE — 3700000001 HC ADD 15 MINUTES (ANESTHESIA): Performed by: ORTHOPAEDIC SURGERY

## 2020-06-15 PROCEDURE — 86900 BLOOD TYPING SEROLOGIC ABO: CPT

## 2020-06-15 PROCEDURE — 6360000002 HC RX W HCPCS: Performed by: ANESTHESIOLOGY

## 2020-06-15 PROCEDURE — 6370000000 HC RX 637 (ALT 250 FOR IP): Performed by: ORTHOPAEDIC SURGERY

## 2020-06-15 PROCEDURE — 2500000003 HC RX 250 WO HCPCS: Performed by: ORTHOPAEDIC SURGERY

## 2020-06-15 PROCEDURE — 2580000003 HC RX 258: Performed by: PHYSICIAN ASSISTANT

## 2020-06-15 PROCEDURE — 97530 THERAPEUTIC ACTIVITIES: CPT

## 2020-06-15 PROCEDURE — C1776 JOINT DEVICE (IMPLANTABLE): HCPCS | Performed by: ORTHOPAEDIC SURGERY

## 2020-06-15 PROCEDURE — 2580000003 HC RX 258: Performed by: ANESTHESIOLOGY

## 2020-06-15 PROCEDURE — 97161 PT EVAL LOW COMPLEX 20 MIN: CPT

## 2020-06-15 PROCEDURE — G0378 HOSPITAL OBSERVATION PER HR: HCPCS

## 2020-06-15 PROCEDURE — 3700000000 HC ANESTHESIA ATTENDED CARE: Performed by: ORTHOPAEDIC SURGERY

## 2020-06-15 PROCEDURE — C9290 INJ, BUPIVACAINE LIPOSOME: HCPCS | Performed by: ANESTHESIOLOGY

## 2020-06-15 PROCEDURE — 7100000001 HC PACU RECOVERY - ADDTL 15 MIN: Performed by: ORTHOPAEDIC SURGERY

## 2020-06-15 PROCEDURE — 97535 SELF CARE MNGMENT TRAINING: CPT

## 2020-06-15 PROCEDURE — 2580000003 HC RX 258: Performed by: ORTHOPAEDIC SURGERY

## 2020-06-15 PROCEDURE — 6370000000 HC RX 637 (ALT 250 FOR IP): Performed by: PHYSICIAN ASSISTANT

## 2020-06-15 PROCEDURE — 94150 VITAL CAPACITY TEST: CPT

## 2020-06-15 PROCEDURE — 51798 US URINE CAPACITY MEASURE: CPT

## 2020-06-15 PROCEDURE — 2500000003 HC RX 250 WO HCPCS: Performed by: NURSE ANESTHETIST, CERTIFIED REGISTERED

## 2020-06-15 PROCEDURE — 86850 RBC ANTIBODY SCREEN: CPT

## 2020-06-15 PROCEDURE — 6360000002 HC RX W HCPCS: Performed by: PHYSICIAN ASSISTANT

## 2020-06-15 PROCEDURE — 73560 X-RAY EXAM OF KNEE 1 OR 2: CPT

## 2020-06-15 PROCEDURE — 3600000005 HC SURGERY LEVEL 5 BASE: Performed by: ORTHOPAEDIC SURGERY

## 2020-06-15 PROCEDURE — 2709999900 HC NON-CHARGEABLE SUPPLY: Performed by: ORTHOPAEDIC SURGERY

## 2020-06-15 PROCEDURE — 2720000010 HC SURG SUPPLY STERILE: Performed by: ORTHOPAEDIC SURGERY

## 2020-06-15 PROCEDURE — 86901 BLOOD TYPING SEROLOGIC RH(D): CPT

## 2020-06-15 PROCEDURE — 20985 CPTR-ASST DIR MS PX: CPT | Performed by: ORTHOPAEDIC SURGERY

## 2020-06-15 PROCEDURE — 2780000010 HC IMPLANT OTHER: Performed by: ORTHOPAEDIC SURGERY

## 2020-06-15 PROCEDURE — C1713 ANCHOR/SCREW BN/BN,TIS/BN: HCPCS | Performed by: ORTHOPAEDIC SURGERY

## 2020-06-15 PROCEDURE — 27447 TOTAL KNEE ARTHROPLASTY: CPT | Performed by: ORTHOPAEDIC SURGERY

## 2020-06-15 PROCEDURE — 20610 DRAIN/INJ JOINT/BURSA W/O US: CPT | Performed by: ORTHOPAEDIC SURGERY

## 2020-06-15 PROCEDURE — 6360000002 HC RX W HCPCS: Performed by: NURSE ANESTHETIST, CERTIFIED REGISTERED

## 2020-06-15 PROCEDURE — 64447 NJX AA&/STRD FEMORAL NRV IMG: CPT | Performed by: ANESTHESIOLOGY

## 2020-06-15 PROCEDURE — 3600000015 HC SURGERY LEVEL 5 ADDTL 15MIN: Performed by: ORTHOPAEDIC SURGERY

## 2020-06-15 PROCEDURE — 51701 INSERT BLADDER CATHETER: CPT

## 2020-06-15 PROCEDURE — 97165 OT EVAL LOW COMPLEX 30 MIN: CPT

## 2020-06-15 PROCEDURE — 97116 GAIT TRAINING THERAPY: CPT

## 2020-06-15 PROCEDURE — 6360000002 HC RX W HCPCS: Performed by: RADIOLOGY

## 2020-06-15 DEVICE — Z DUP USE 2373673 TRITANIUM SYMMETRIC METAL BACKED PATELLA S36X10: Type: IMPLANTABLE DEVICE | Site: KNEE | Status: FUNCTIONAL

## 2020-06-15 DEVICE — COMPONENT FEM SZ 4 R KNEE POST STBL CEM TRIATHLON: Type: IMPLANTABLE DEVICE | Site: KNEE | Status: FUNCTIONAL

## 2020-06-15 DEVICE — COMPONENT TOT KNEE CAPPED PRIMARY CEM X3 TRIATHLON: Type: IMPLANTABLE DEVICE | Site: KNEE | Status: FUNCTIONAL

## 2020-06-15 DEVICE — CEMENT BNE 20ML 41GM FULL DOSE PMMA W/ TOBRA M VISC RADPQ: Type: IMPLANTABLE DEVICE | Site: KNEE | Status: FUNCTIONAL

## 2020-06-15 DEVICE — BASEPLATE TIB SZ 5 AP49MM ML74MM KNEE TRITANIUM 4 CRUCFRM: Type: IMPLANTABLE DEVICE | Site: KNEE | Status: FUNCTIONAL

## 2020-06-15 DEVICE — INSERT TIB BEAR SZ 5 THK9MM KNEE X3 POST STBL TRIATHLON: Type: IMPLANTABLE DEVICE | Site: KNEE | Status: FUNCTIONAL

## 2020-06-15 RX ORDER — OXYCODONE HYDROCHLORIDE 5 MG/1
10 TABLET ORAL EVERY 4 HOURS PRN
Status: DISCONTINUED | OUTPATIENT
Start: 2020-06-15 | End: 2020-06-16 | Stop reason: HOSPADM

## 2020-06-15 RX ORDER — ONDANSETRON 2 MG/ML
INJECTION INTRAMUSCULAR; INTRAVENOUS PRN
Status: DISCONTINUED | OUTPATIENT
Start: 2020-06-15 | End: 2020-06-15 | Stop reason: SDUPTHER

## 2020-06-15 RX ORDER — ASPIRIN 325 MG
325 TABLET, DELAYED RELEASE (ENTERIC COATED) ORAL 2 TIMES DAILY
Qty: 60 TABLET | Refills: 1 | Status: CANCELLED | OUTPATIENT
Start: 2020-06-15

## 2020-06-15 RX ORDER — HYDRALAZINE HYDROCHLORIDE 20 MG/ML
5 INJECTION INTRAMUSCULAR; INTRAVENOUS EVERY 10 MIN PRN
Status: DISCONTINUED | OUTPATIENT
Start: 2020-06-15 | End: 2020-06-15 | Stop reason: HOSPADM

## 2020-06-15 RX ORDER — CETIRIZINE HYDROCHLORIDE 10 MG/1
10 TABLET ORAL DAILY
Status: DISCONTINUED | OUTPATIENT
Start: 2020-06-16 | End: 2020-06-16 | Stop reason: HOSPADM

## 2020-06-15 RX ORDER — LOSARTAN POTASSIUM 25 MG/1
100 TABLET ORAL DAILY
Status: DISCONTINUED | OUTPATIENT
Start: 2020-06-16 | End: 2020-06-16 | Stop reason: HOSPADM

## 2020-06-15 RX ORDER — VANCOMYCIN HYDROCHLORIDE 1 G/20ML
INJECTION, POWDER, LYOPHILIZED, FOR SOLUTION INTRAVENOUS PRN
Status: DISCONTINUED | OUTPATIENT
Start: 2020-06-15 | End: 2020-06-15 | Stop reason: ALTCHOICE

## 2020-06-15 RX ORDER — FENTANYL CITRATE 50 UG/ML
50 INJECTION, SOLUTION INTRAMUSCULAR; INTRAVENOUS EVERY 5 MIN PRN
Status: COMPLETED | OUTPATIENT
Start: 2020-06-15 | End: 2020-06-15

## 2020-06-15 RX ORDER — MEPERIDINE HYDROCHLORIDE 25 MG/ML
12.5 INJECTION INTRAMUSCULAR; INTRAVENOUS; SUBCUTANEOUS EVERY 5 MIN PRN
Status: DISCONTINUED | OUTPATIENT
Start: 2020-06-15 | End: 2020-06-15 | Stop reason: HOSPADM

## 2020-06-15 RX ORDER — BUPIVACAINE HYDROCHLORIDE 5 MG/ML
INJECTION, SOLUTION EPIDURAL; INTRACAUDAL
Status: DISPENSED
Start: 2020-06-15 | End: 2020-06-15

## 2020-06-15 RX ORDER — FENTANYL CITRATE 50 UG/ML
INJECTION, SOLUTION INTRAMUSCULAR; INTRAVENOUS PRN
Status: DISCONTINUED | OUTPATIENT
Start: 2020-06-15 | End: 2020-06-15 | Stop reason: SDUPTHER

## 2020-06-15 RX ORDER — SENNA AND DOCUSATE SODIUM 50; 8.6 MG/1; MG/1
1 TABLET, FILM COATED ORAL 2 TIMES DAILY
Status: DISCONTINUED | OUTPATIENT
Start: 2020-06-15 | End: 2020-06-16 | Stop reason: HOSPADM

## 2020-06-15 RX ORDER — PROCHLORPERAZINE EDISYLATE 5 MG/ML
5 INJECTION INTRAMUSCULAR; INTRAVENOUS
Status: DISCONTINUED | OUTPATIENT
Start: 2020-06-15 | End: 2020-06-15 | Stop reason: HOSPADM

## 2020-06-15 RX ORDER — ZOLPIDEM TARTRATE 5 MG/1
5 TABLET ORAL NIGHTLY PRN
Qty: 14 TABLET | Refills: 0 | Status: CANCELLED | OUTPATIENT
Start: 2020-06-15 | End: 2020-06-29

## 2020-06-15 RX ORDER — ROPIVACAINE HYDROCHLORIDE 5 MG/ML
INJECTION, SOLUTION EPIDURAL; INFILTRATION; PERINEURAL
Status: DISPENSED
Start: 2020-06-15 | End: 2020-06-15

## 2020-06-15 RX ORDER — SUCCINYLCHOLINE CHLORIDE 20 MG/ML
INJECTION INTRAMUSCULAR; INTRAVENOUS PRN
Status: DISCONTINUED | OUTPATIENT
Start: 2020-06-15 | End: 2020-06-15 | Stop reason: SDUPTHER

## 2020-06-15 RX ORDER — ACETAMINOPHEN 325 MG/1
650 TABLET ORAL EVERY 6 HOURS
Status: DISCONTINUED | OUTPATIENT
Start: 2020-06-15 | End: 2020-06-16 | Stop reason: HOSPADM

## 2020-06-15 RX ORDER — MAGNESIUM HYDROXIDE 1200 MG/15ML
LIQUID ORAL CONTINUOUS PRN
Status: COMPLETED | OUTPATIENT
Start: 2020-06-15 | End: 2020-06-15

## 2020-06-15 RX ORDER — 0.9 % SODIUM CHLORIDE 0.9 %
500 INTRAVENOUS SOLUTION INTRAVENOUS
Status: DISCONTINUED | OUTPATIENT
Start: 2020-06-15 | End: 2020-06-15 | Stop reason: HOSPADM

## 2020-06-15 RX ORDER — SODIUM CHLORIDE 0.9 % (FLUSH) 0.9 %
10 SYRINGE (ML) INJECTION EVERY 12 HOURS SCHEDULED
Status: DISCONTINUED | OUTPATIENT
Start: 2020-06-15 | End: 2020-06-16 | Stop reason: HOSPADM

## 2020-06-15 RX ORDER — GLYCOPYRROLATE 1 MG/5 ML
SYRINGE (ML) INTRAVENOUS PRN
Status: DISCONTINUED | OUTPATIENT
Start: 2020-06-15 | End: 2020-06-15 | Stop reason: SDUPTHER

## 2020-06-15 RX ORDER — KETOROLAC TROMETHAMINE 30 MG/ML
INJECTION, SOLUTION INTRAMUSCULAR; INTRAVENOUS
Status: DISPENSED
Start: 2020-06-15 | End: 2020-06-16

## 2020-06-15 RX ORDER — NEOSTIGMINE METHYLSULFATE 5 MG/5 ML
SYRINGE (ML) INTRAVENOUS PRN
Status: DISCONTINUED | OUTPATIENT
Start: 2020-06-15 | End: 2020-06-15 | Stop reason: SDUPTHER

## 2020-06-15 RX ORDER — ACETAMINOPHEN 500 MG
1000 TABLET ORAL ONCE
Status: COMPLETED | OUTPATIENT
Start: 2020-06-15 | End: 2020-06-15

## 2020-06-15 RX ORDER — HYDROCHLOROTHIAZIDE 25 MG/1
25 TABLET ORAL DAILY
Status: DISCONTINUED | OUTPATIENT
Start: 2020-06-15 | End: 2020-06-16 | Stop reason: HOSPADM

## 2020-06-15 RX ORDER — SODIUM CHLORIDE, SODIUM LACTATE, POTASSIUM CHLORIDE, CALCIUM CHLORIDE 600; 310; 30; 20 MG/100ML; MG/100ML; MG/100ML; MG/100ML
INJECTION, SOLUTION INTRAVENOUS CONTINUOUS
Status: DISCONTINUED | OUTPATIENT
Start: 2020-06-15 | End: 2020-06-15

## 2020-06-15 RX ORDER — DEXAMETHASONE SODIUM PHOSPHATE 4 MG/ML
INJECTION, SOLUTION INTRA-ARTICULAR; INTRALESIONAL; INTRAMUSCULAR; INTRAVENOUS; SOFT TISSUE PRN
Status: DISCONTINUED | OUTPATIENT
Start: 2020-06-15 | End: 2020-06-15 | Stop reason: SDUPTHER

## 2020-06-15 RX ORDER — PREDNISONE 10 MG/1
10 TABLET ORAL DAILY
Status: DISCONTINUED | OUTPATIENT
Start: 2020-06-15 | End: 2020-06-16 | Stop reason: HOSPADM

## 2020-06-15 RX ORDER — DIPHENHYDRAMINE HYDROCHLORIDE 50 MG/ML
12.5 INJECTION INTRAMUSCULAR; INTRAVENOUS
Status: DISCONTINUED | OUTPATIENT
Start: 2020-06-15 | End: 2020-06-15 | Stop reason: HOSPADM

## 2020-06-15 RX ORDER — OXYCODONE HYDROCHLORIDE 5 MG/1
5 TABLET ORAL EVERY 4 HOURS PRN
Status: DISCONTINUED | OUTPATIENT
Start: 2020-06-15 | End: 2020-06-16 | Stop reason: HOSPADM

## 2020-06-15 RX ORDER — CEFAZOLIN SODIUM 2 G/50ML
2 SOLUTION INTRAVENOUS ONCE
Status: COMPLETED | OUTPATIENT
Start: 2020-06-15 | End: 2020-06-15

## 2020-06-15 RX ORDER — PANTOPRAZOLE SODIUM 40 MG/1
40 TABLET, DELAYED RELEASE ORAL
Status: DISCONTINUED | OUTPATIENT
Start: 2020-06-16 | End: 2020-06-16 | Stop reason: HOSPADM

## 2020-06-15 RX ORDER — MIDAZOLAM HYDROCHLORIDE 1 MG/ML
INJECTION INTRAMUSCULAR; INTRAVENOUS PRN
Status: DISCONTINUED | OUTPATIENT
Start: 2020-06-15 | End: 2020-06-15 | Stop reason: SDUPTHER

## 2020-06-15 RX ORDER — ROCURONIUM BROMIDE 10 MG/ML
INJECTION, SOLUTION INTRAVENOUS PRN
Status: DISCONTINUED | OUTPATIENT
Start: 2020-06-15 | End: 2020-06-15 | Stop reason: SDUPTHER

## 2020-06-15 RX ORDER — SODIUM CHLORIDE 0.9 % (FLUSH) 0.9 %
10 SYRINGE (ML) INJECTION PRN
Status: DISCONTINUED | OUTPATIENT
Start: 2020-06-15 | End: 2020-06-16 | Stop reason: HOSPADM

## 2020-06-15 RX ORDER — HYDROCODONE BITARTRATE AND ACETAMINOPHEN 5; 325 MG/1; MG/1
1 TABLET ORAL
Status: DISCONTINUED | OUTPATIENT
Start: 2020-06-15 | End: 2020-06-15 | Stop reason: HOSPADM

## 2020-06-15 RX ORDER — ONDANSETRON 2 MG/ML
4 INJECTION INTRAMUSCULAR; INTRAVENOUS
Status: DISCONTINUED | OUTPATIENT
Start: 2020-06-15 | End: 2020-06-15 | Stop reason: HOSPADM

## 2020-06-15 RX ORDER — CELECOXIB 200 MG/1
400 CAPSULE ORAL ONCE
Status: COMPLETED | OUTPATIENT
Start: 2020-06-15 | End: 2020-06-15

## 2020-06-15 RX ORDER — PROPOFOL 10 MG/ML
INJECTION, EMULSION INTRAVENOUS PRN
Status: DISCONTINUED | OUTPATIENT
Start: 2020-06-15 | End: 2020-06-15 | Stop reason: SDUPTHER

## 2020-06-15 RX ORDER — DEXAMETHASONE SODIUM PHOSPHATE 4 MG/ML
10 INJECTION, SOLUTION INTRA-ARTICULAR; INTRALESIONAL; INTRAMUSCULAR; INTRAVENOUS; SOFT TISSUE ONCE
Status: COMPLETED | OUTPATIENT
Start: 2020-06-15 | End: 2020-06-15

## 2020-06-15 RX ORDER — LIDOCAINE HYDROCHLORIDE 20 MG/ML
INJECTION, SOLUTION INFILTRATION; PERINEURAL PRN
Status: DISCONTINUED | OUTPATIENT
Start: 2020-06-15 | End: 2020-06-15 | Stop reason: SDUPTHER

## 2020-06-15 RX ORDER — AMLODIPINE BESYLATE 5 MG/1
5 TABLET ORAL DAILY
Status: DISCONTINUED | OUTPATIENT
Start: 2020-06-16 | End: 2020-06-16 | Stop reason: HOSPADM

## 2020-06-15 RX ORDER — ZOLPIDEM TARTRATE 5 MG/1
5 TABLET ORAL NIGHTLY PRN
Status: DISCONTINUED | OUTPATIENT
Start: 2020-06-15 | End: 2020-06-16 | Stop reason: HOSPADM

## 2020-06-15 RX ORDER — PREGABALIN 150 MG/1
150 CAPSULE ORAL ONCE
Status: COMPLETED | OUTPATIENT
Start: 2020-06-15 | End: 2020-06-15

## 2020-06-15 RX ORDER — KETOROLAC TROMETHAMINE 30 MG/ML
30 INJECTION, SOLUTION INTRAMUSCULAR; INTRAVENOUS ONCE
Status: COMPLETED | OUTPATIENT
Start: 2020-06-15 | End: 2020-06-15

## 2020-06-15 RX ORDER — ONDANSETRON 2 MG/ML
4 INJECTION INTRAMUSCULAR; INTRAVENOUS EVERY 6 HOURS PRN
Status: DISCONTINUED | OUTPATIENT
Start: 2020-06-15 | End: 2020-06-16 | Stop reason: HOSPADM

## 2020-06-15 RX ORDER — SODIUM CHLORIDE 450 MG/100ML
INJECTION, SOLUTION INTRAVENOUS CONTINUOUS
Status: DISCONTINUED | OUTPATIENT
Start: 2020-06-15 | End: 2020-06-16 | Stop reason: HOSPADM

## 2020-06-15 RX ORDER — OXYCODONE HCL 10 MG/1
20 TABLET, FILM COATED, EXTENDED RELEASE ORAL ONCE
Status: COMPLETED | OUTPATIENT
Start: 2020-06-15 | End: 2020-06-15

## 2020-06-15 RX ORDER — PROMETHAZINE HYDROCHLORIDE 12.5 MG/1
12.5 TABLET ORAL EVERY 6 HOURS PRN
Status: DISCONTINUED | OUTPATIENT
Start: 2020-06-15 | End: 2020-06-16 | Stop reason: HOSPADM

## 2020-06-15 RX ORDER — FENTANYL CITRATE 50 UG/ML
50 INJECTION, SOLUTION INTRAMUSCULAR; INTRAVENOUS
Status: DISCONTINUED | OUTPATIENT
Start: 2020-06-15 | End: 2020-06-15 | Stop reason: SDUPTHER

## 2020-06-15 RX ORDER — MIDAZOLAM HYDROCHLORIDE 1 MG/ML
INJECTION INTRAMUSCULAR; INTRAVENOUS
Status: COMPLETED
Start: 2020-06-15 | End: 2020-06-15

## 2020-06-15 RX ADMIN — MEPERIDINE HYDROCHLORIDE 12.5 MG: 25 INJECTION INTRAMUSCULAR; INTRAVENOUS; SUBCUTANEOUS at 12:32

## 2020-06-15 RX ADMIN — CEFAZOLIN 3 G: 10 INJECTION, POWDER, FOR SOLUTION INTRAVENOUS at 23:47

## 2020-06-15 RX ADMIN — CELECOXIB 400 MG: 200 CAPSULE ORAL at 07:20

## 2020-06-15 RX ADMIN — SODIUM CHLORIDE: 4.5 INJECTION, SOLUTION INTRAVENOUS at 11:39

## 2020-06-15 RX ADMIN — ROCURONIUM BROMIDE 30 MG: 10 INJECTION, SOLUTION INTRAVENOUS at 08:09

## 2020-06-15 RX ADMIN — CEFAZOLIN SODIUM 2 G: 2 SOLUTION INTRAVENOUS at 07:53

## 2020-06-15 RX ADMIN — SODIUM CHLORIDE, SODIUM LACTATE, POTASSIUM CHLORIDE, AND CALCIUM CHLORIDE: 600; 310; 30; 20 INJECTION, SOLUTION INTRAVENOUS at 09:04

## 2020-06-15 RX ADMIN — PHENYLEPHRINE HYDROCHLORIDE 100 MCG: 10 INJECTION, SOLUTION INTRAMUSCULAR; INTRAVENOUS; SUBCUTANEOUS at 10:20

## 2020-06-15 RX ADMIN — ROCURONIUM BROMIDE 10 MG: 10 INJECTION, SOLUTION INTRAVENOUS at 09:22

## 2020-06-15 RX ADMIN — ASPIRIN 325 MG: 325 TABLET, COATED ORAL at 19:54

## 2020-06-15 RX ADMIN — SUCCINYLCHOLINE CHLORIDE 120 MG: 20 INJECTION, SOLUTION INTRAMUSCULAR; INTRAVENOUS; PARENTERAL at 07:44

## 2020-06-15 RX ADMIN — HYDROMORPHONE HYDROCHLORIDE 0.5 MG: 1 INJECTION, SOLUTION INTRAMUSCULAR; INTRAVENOUS; SUBCUTANEOUS at 11:46

## 2020-06-15 RX ADMIN — KETOROLAC TROMETHAMINE 30 MG: 30 INJECTION, SOLUTION INTRAMUSCULAR at 13:15

## 2020-06-15 RX ADMIN — PREDNISONE 10 MG: 10 TABLET ORAL at 15:17

## 2020-06-15 RX ADMIN — ROCURONIUM BROMIDE 10 MG: 10 INJECTION, SOLUTION INTRAVENOUS at 08:50

## 2020-06-15 RX ADMIN — ROCURONIUM BROMIDE 20 MG: 10 INJECTION, SOLUTION INTRAVENOUS at 09:52

## 2020-06-15 RX ADMIN — MEPERIDINE HYDROCHLORIDE 12.5 MG: 25 INJECTION INTRAMUSCULAR; INTRAVENOUS; SUBCUTANEOUS at 12:09

## 2020-06-15 RX ADMIN — PHENYLEPHRINE HYDROCHLORIDE 100 MCG: 10 INJECTION, SOLUTION INTRAMUSCULAR; INTRAVENOUS; SUBCUTANEOUS at 08:17

## 2020-06-15 RX ADMIN — CEFAZOLIN 3 G: 10 INJECTION, POWDER, FOR SOLUTION INTRAVENOUS at 15:18

## 2020-06-15 RX ADMIN — FENTANYL CITRATE 50 MCG: 50 INJECTION, SOLUTION INTRAMUSCULAR; INTRAVENOUS at 13:13

## 2020-06-15 RX ADMIN — TRANEXAMIC ACID 1.5 G: 1 INJECTION, SOLUTION INTRAVENOUS at 08:12

## 2020-06-15 RX ADMIN — DEXAMETHASONE SODIUM PHOSPHATE 10 MG: 4 INJECTION, SOLUTION INTRAMUSCULAR; INTRAVENOUS at 07:27

## 2020-06-15 RX ADMIN — IBUPROFEN 800 MG: 200 TABLET, FILM COATED ORAL at 17:39

## 2020-06-15 RX ADMIN — PHENYLEPHRINE HYDROCHLORIDE 200 MCG: 10 INJECTION, SOLUTION INTRAMUSCULAR; INTRAVENOUS; SUBCUTANEOUS at 10:27

## 2020-06-15 RX ADMIN — OXYCODONE HYDROCHLORIDE 20 MG: 10 TABLET, FILM COATED, EXTENDED RELEASE ORAL at 07:20

## 2020-06-15 RX ADMIN — ACETAMINOPHEN 650 MG: 325 TABLET ORAL at 15:17

## 2020-06-15 RX ADMIN — DOCUSATE SODIUM 50 MG AND SENNOSIDES 8.6 MG 1 TABLET: 8.6; 5 TABLET, FILM COATED ORAL at 15:17

## 2020-06-15 RX ADMIN — LIDOCAINE HYDROCHLORIDE 80 MG: 20 INJECTION, SOLUTION INFILTRATION; PERINEURAL at 07:44

## 2020-06-15 RX ADMIN — PHENYLEPHRINE HYDROCHLORIDE 100 MCG: 10 INJECTION, SOLUTION INTRAMUSCULAR; INTRAVENOUS; SUBCUTANEOUS at 08:11

## 2020-06-15 RX ADMIN — HYDROMORPHONE HYDROCHLORIDE 0.5 MG: 1 INJECTION, SOLUTION INTRAMUSCULAR; INTRAVENOUS; SUBCUTANEOUS at 12:03

## 2020-06-15 RX ADMIN — MIDAZOLAM HYDROCHLORIDE 2 MG: 2 INJECTION, SOLUTION INTRAMUSCULAR; INTRAVENOUS at 07:26

## 2020-06-15 RX ADMIN — FENTANYL CITRATE 50 MCG: 50 INJECTION, SOLUTION INTRAMUSCULAR; INTRAVENOUS at 13:01

## 2020-06-15 RX ADMIN — MIDAZOLAM HYDROCHLORIDE 2 MG: 2 INJECTION, SOLUTION INTRAMUSCULAR; INTRAVENOUS at 07:41

## 2020-06-15 RX ADMIN — PROPOFOL 300 MG: 10 INJECTION, EMULSION INTRAVENOUS at 07:44

## 2020-06-15 RX ADMIN — ONDANSETRON 4 MG: 2 INJECTION INTRAMUSCULAR; INTRAVENOUS at 10:40

## 2020-06-15 RX ADMIN — SODIUM CHLORIDE, SODIUM LACTATE, POTASSIUM CHLORIDE, AND CALCIUM CHLORIDE: 600; 310; 30; 20 INJECTION, SOLUTION INTRAVENOUS at 07:19

## 2020-06-15 RX ADMIN — HYDROMORPHONE HYDROCHLORIDE 0.5 MG: 1 INJECTION, SOLUTION INTRAMUSCULAR; INTRAVENOUS; SUBCUTANEOUS at 11:58

## 2020-06-15 RX ADMIN — PHENYLEPHRINE HYDROCHLORIDE 100 MCG: 10 INJECTION, SOLUTION INTRAMUSCULAR; INTRAVENOUS; SUBCUTANEOUS at 08:02

## 2020-06-15 RX ADMIN — PREGABALIN 150 MG: 150 CAPSULE ORAL at 07:21

## 2020-06-15 RX ADMIN — Medication 0.4 MG: at 10:41

## 2020-06-15 RX ADMIN — BUPIVACAINE 10 ML: 13.3 INJECTION, SUSPENSION, LIPOSOMAL INFILTRATION at 07:37

## 2020-06-15 RX ADMIN — Medication 3 MG: at 10:41

## 2020-06-15 RX ADMIN — FENTANYL CITRATE 50 MCG: 50 INJECTION INTRAMUSCULAR; INTRAVENOUS at 07:41

## 2020-06-15 RX ADMIN — OXYCODONE 5 MG: 5 TABLET ORAL at 23:49

## 2020-06-15 RX ADMIN — HYDROCHLOROTHIAZIDE 25 MG: 25 TABLET ORAL at 15:17

## 2020-06-15 RX ADMIN — ZOLPIDEM TARTRATE 5 MG: 5 TABLET ORAL at 23:05

## 2020-06-15 RX ADMIN — ACETAMINOPHEN 1000 MG: 500 TABLET ORAL at 07:20

## 2020-06-15 RX ADMIN — DOCUSATE SODIUM 50 MG AND SENNOSIDES 8.6 MG 1 TABLET: 8.6; 5 TABLET, FILM COATED ORAL at 19:55

## 2020-06-15 RX ADMIN — FENTANYL CITRATE 50 MCG: 50 INJECTION INTRAMUSCULAR; INTRAVENOUS at 08:24

## 2020-06-15 RX ADMIN — PHENYLEPHRINE HYDROCHLORIDE 100 MCG: 10 INJECTION, SOLUTION INTRAMUSCULAR; INTRAVENOUS; SUBCUTANEOUS at 08:05

## 2020-06-15 RX ADMIN — PHENYLEPHRINE HYDROCHLORIDE 100 MCG: 10 INJECTION, SOLUTION INTRAMUSCULAR; INTRAVENOUS; SUBCUTANEOUS at 10:23

## 2020-06-15 RX ADMIN — ACETAMINOPHEN 650 MG: 325 TABLET ORAL at 19:54

## 2020-06-15 RX ADMIN — HYDROMORPHONE HYDROCHLORIDE 0.5 MG: 1 INJECTION, SOLUTION INTRAMUSCULAR; INTRAVENOUS; SUBCUTANEOUS at 11:41

## 2020-06-15 RX ADMIN — DEXAMETHASONE SODIUM PHOSPHATE 8 MG: 4 INJECTION, SOLUTION INTRAMUSCULAR; INTRAVENOUS at 07:49

## 2020-06-15 RX ADMIN — PHENYLEPHRINE HYDROCHLORIDE 200 MCG: 10 INJECTION, SOLUTION INTRAMUSCULAR; INTRAVENOUS; SUBCUTANEOUS at 10:36

## 2020-06-15 RX ADMIN — OXYCODONE 10 MG: 5 TABLET ORAL at 19:54

## 2020-06-15 ASSESSMENT — PULMONARY FUNCTION TESTS
PIF_VALUE: 31
PIF_VALUE: 22
PIF_VALUE: 31
PIF_VALUE: 30
PIF_VALUE: 28
PIF_VALUE: 13
PIF_VALUE: 14
PIF_VALUE: 8
PIF_VALUE: 27
PIF_VALUE: 22
PIF_VALUE: 21
PIF_VALUE: 29
PIF_VALUE: 22
PIF_VALUE: 0
PIF_VALUE: 21
PIF_VALUE: 13
PIF_VALUE: 22
PIF_VALUE: 22
PIF_VALUE: 23
PIF_VALUE: 13
PIF_VALUE: 19
PIF_VALUE: 1
PIF_VALUE: 14
PIF_VALUE: 13
PIF_VALUE: 22
PIF_VALUE: 14
PIF_VALUE: 18
PIF_VALUE: 23
PIF_VALUE: 29
PIF_VALUE: 21
PIF_VALUE: 20
PIF_VALUE: 20
PIF_VALUE: 21
PIF_VALUE: 25
PIF_VALUE: 23
PIF_VALUE: 22
PIF_VALUE: 22
PIF_VALUE: 28
PIF_VALUE: 28
PIF_VALUE: 26
PIF_VALUE: 14
PIF_VALUE: 13
PIF_VALUE: 22
PIF_VALUE: 29
PIF_VALUE: 22
PIF_VALUE: 29
PIF_VALUE: 26
PIF_VALUE: 22
PIF_VALUE: 23
PIF_VALUE: 10
PIF_VALUE: 30
PIF_VALUE: 0
PIF_VALUE: 13
PIF_VALUE: 21
PIF_VALUE: 22
PIF_VALUE: 20
PIF_VALUE: 21
PIF_VALUE: 30
PIF_VALUE: 13
PIF_VALUE: 17
PIF_VALUE: 18
PIF_VALUE: 5
PIF_VALUE: 1
PIF_VALUE: 14
PIF_VALUE: 22
PIF_VALUE: 20
PIF_VALUE: 27
PIF_VALUE: 19
PIF_VALUE: 18
PIF_VALUE: 29
PIF_VALUE: 14
PIF_VALUE: 30
PIF_VALUE: 20
PIF_VALUE: 22
PIF_VALUE: 21
PIF_VALUE: 21
PIF_VALUE: 22
PIF_VALUE: 23
PIF_VALUE: 14
PIF_VALUE: 21
PIF_VALUE: 22
PIF_VALUE: 21
PIF_VALUE: 30
PIF_VALUE: 26
PIF_VALUE: 22
PIF_VALUE: 13
PIF_VALUE: 22
PIF_VALUE: 21
PIF_VALUE: 19
PIF_VALUE: 23
PIF_VALUE: 22
PIF_VALUE: 25
PIF_VALUE: 22
PIF_VALUE: 21
PIF_VALUE: 30
PIF_VALUE: 23
PIF_VALUE: 13
PIF_VALUE: 22
PIF_VALUE: 21
PIF_VALUE: 14
PIF_VALUE: 22
PIF_VALUE: 14
PIF_VALUE: 22
PIF_VALUE: 22
PIF_VALUE: 8
PIF_VALUE: 14
PIF_VALUE: 28
PIF_VALUE: 20
PIF_VALUE: 1
PIF_VALUE: 22
PIF_VALUE: 22
PIF_VALUE: 14
PIF_VALUE: 21
PIF_VALUE: 30
PIF_VALUE: 26
PIF_VALUE: 13
PIF_VALUE: 21
PIF_VALUE: 22
PIF_VALUE: 18
PIF_VALUE: 20
PIF_VALUE: 30
PIF_VALUE: 0
PIF_VALUE: 22
PIF_VALUE: 23
PIF_VALUE: 19
PIF_VALUE: 21
PIF_VALUE: 18
PIF_VALUE: 20
PIF_VALUE: 15
PIF_VALUE: 14
PIF_VALUE: 19
PIF_VALUE: 21
PIF_VALUE: 21
PIF_VALUE: 5
PIF_VALUE: 14
PIF_VALUE: 20
PIF_VALUE: 27
PIF_VALUE: 20
PIF_VALUE: 5
PIF_VALUE: 26
PIF_VALUE: 15
PIF_VALUE: 18
PIF_VALUE: 22
PIF_VALUE: 26
PIF_VALUE: 20
PIF_VALUE: 21
PIF_VALUE: 20
PIF_VALUE: 14
PIF_VALUE: 30
PIF_VALUE: 22
PIF_VALUE: 31
PIF_VALUE: 18
PIF_VALUE: 1
PIF_VALUE: 40
PIF_VALUE: 14
PIF_VALUE: 22
PIF_VALUE: 6
PIF_VALUE: 30
PIF_VALUE: 18
PIF_VALUE: 20
PIF_VALUE: 21
PIF_VALUE: 20
PIF_VALUE: 10
PIF_VALUE: 22
PIF_VALUE: 19
PIF_VALUE: 28
PIF_VALUE: 29
PIF_VALUE: 30
PIF_VALUE: 1
PIF_VALUE: 20
PIF_VALUE: 23
PIF_VALUE: 18
PIF_VALUE: 30
PIF_VALUE: 22
PIF_VALUE: 14
PIF_VALUE: 30
PIF_VALUE: 18
PIF_VALUE: 6
PIF_VALUE: 22
PIF_VALUE: 26
PIF_VALUE: 14
PIF_VALUE: 21
PIF_VALUE: 22
PIF_VALUE: 30
PIF_VALUE: 22
PIF_VALUE: 22
PIF_VALUE: 23
PIF_VALUE: 27
PIF_VALUE: 14
PIF_VALUE: 14
PIF_VALUE: 18
PIF_VALUE: 19
PIF_VALUE: 22
PIF_VALUE: 13
PIF_VALUE: 23
PIF_VALUE: 17
PIF_VALUE: 21
PIF_VALUE: 30
PIF_VALUE: 22
PIF_VALUE: 22
PIF_VALUE: 7
PIF_VALUE: 29
PIF_VALUE: 23
PIF_VALUE: 18
PIF_VALUE: 14
PIF_VALUE: 13
PIF_VALUE: 22
PIF_VALUE: 6
PIF_VALUE: 22
PIF_VALUE: 20
PIF_VALUE: 22
PIF_VALUE: 22
PIF_VALUE: 14
PIF_VALUE: 29
PIF_VALUE: 49
PIF_VALUE: 22
PIF_VALUE: 20
PIF_VALUE: 21
PIF_VALUE: 22
PIF_VALUE: 25
PIF_VALUE: 22
PIF_VALUE: 28
PIF_VALUE: 0
PIF_VALUE: 20
PIF_VALUE: 13
PIF_VALUE: 21
PIF_VALUE: 20
PIF_VALUE: 14
PIF_VALUE: 23

## 2020-06-15 ASSESSMENT — PAIN SCALES - GENERAL
PAINLEVEL_OUTOF10: 3
PAINLEVEL_OUTOF10: 4
PAINLEVEL_OUTOF10: 8
PAINLEVEL_OUTOF10: 8
PAINLEVEL_OUTOF10: 7
PAINLEVEL_OUTOF10: 8
PAINLEVEL_OUTOF10: 7
PAINLEVEL_OUTOF10: 6
PAINLEVEL_OUTOF10: 2
PAINLEVEL_OUTOF10: 7
PAINLEVEL_OUTOF10: 5
PAINLEVEL_OUTOF10: 3
PAINLEVEL_OUTOF10: 7

## 2020-06-15 ASSESSMENT — PAIN DESCRIPTION - ORIENTATION
ORIENTATION: RIGHT
ORIENTATION: RIGHT;POSTERIOR
ORIENTATION: RIGHT;POSTERIOR
ORIENTATION: RIGHT

## 2020-06-15 ASSESSMENT — PAIN DESCRIPTION - FREQUENCY
FREQUENCY: CONTINUOUS
FREQUENCY: INTERMITTENT
FREQUENCY: CONTINUOUS
FREQUENCY: CONTINUOUS
FREQUENCY: INTERMITTENT
FREQUENCY: CONTINUOUS

## 2020-06-15 ASSESSMENT — PAIN DESCRIPTION - ONSET
ONSET: ON-GOING
ONSET: AWAKENED FROM SLEEP
ONSET: ON-GOING
ONSET: ON-GOING
ONSET: GRADUAL

## 2020-06-15 ASSESSMENT — PAIN DESCRIPTION - LOCATION
LOCATION: KNEE

## 2020-06-15 ASSESSMENT — PAIN DESCRIPTION - DESCRIPTORS
DESCRIPTORS: ACHING
DESCRIPTORS: ACHING;DISCOMFORT;SHARP
DESCRIPTORS: ACHING;CONSTANT;DISCOMFORT
DESCRIPTORS: ACHING;DISCOMFORT
DESCRIPTORS: ACHING;DISCOMFORT
DESCRIPTORS: ACHING;PRESSURE
DESCRIPTORS: ACHING;DISCOMFORT
DESCRIPTORS: DISCOMFORT
DESCRIPTORS: ACHING;CONSTANT;DISCOMFORT

## 2020-06-15 ASSESSMENT — PAIN DESCRIPTION - PROGRESSION
CLINICAL_PROGRESSION: GRADUALLY IMPROVING
CLINICAL_PROGRESSION: GRADUALLY WORSENING
CLINICAL_PROGRESSION: NOT CHANGED
CLINICAL_PROGRESSION: NOT CHANGED
CLINICAL_PROGRESSION: GRADUALLY WORSENING
CLINICAL_PROGRESSION: GRADUALLY WORSENING
CLINICAL_PROGRESSION: NOT CHANGED
CLINICAL_PROGRESSION: GRADUALLY IMPROVING

## 2020-06-15 ASSESSMENT — PAIN DESCRIPTION - PAIN TYPE
TYPE: SURGICAL PAIN

## 2020-06-15 ASSESSMENT — PAIN - FUNCTIONAL ASSESSMENT
PAIN_FUNCTIONAL_ASSESSMENT: ACTIVITIES ARE NOT PREVENTED
PAIN_FUNCTIONAL_ASSESSMENT: ACTIVITIES ARE NOT PREVENTED
PAIN_FUNCTIONAL_ASSESSMENT: 0-10

## 2020-06-15 ASSESSMENT — LIFESTYLE VARIABLES: SMOKING_STATUS: 0

## 2020-06-15 NOTE — PROGRESS NOTES
PACU Transfer Note    Vitals:    06/15/20 1330   BP: 123/78   Pulse: 94   Resp: 12   Temp: 99.6 °F (37.6 °C)   SpO2: 97%       In: 2034 [I.V.:1934]  Out: 300     Pain assessment:  receiving treatment-states pain has improved, more tolerable  Pain Level: 5    Report given to Receiving unit ROMEL Gustafson [place]    6/15/2020 1:48 PM

## 2020-06-15 NOTE — PROGRESS NOTES
4 Eyes Admission Assessment     I agree as the admission nurse that 2 RN's have performed a thorough Head to Toe Skin Assessment on the patient. ALL assessment sites listed below have been assessed on admission. Areas assessed by both nurses:   [x]   Head, Face, and Ears   [x]   Shoulders, Back, and Chest  [x]   Arms, Elbows, and Hands   [x]   Coccyx, Sacrum, and Ischum  [x]   Legs, Feet, and Heels        Does the Patient have Skin Breakdown?   No         Shaji Prevention initiated:  No   Wound Care Orders initiated:  No      [facility] nurse consulted for Pressure Injury (Stage 3,4, Unstageable, DTI, NWPT, and Complex wounds):  No      Nurse 1 eSignature: Electronically signed by Zhang Curry RN on 6/15/20 at 2:00 PM EDT    **SHARE this note so that the co-signing nurse is able to place an eSignature**    Nurse 2 eSignature: Electronically signed by Mana Genao RN on 6/15/20 at 2:01 PM EDT

## 2020-06-15 NOTE — ANESTHESIA PRE PROCEDURE
injection 10 mg  10 mg Intravenous Once Mich Chavez MD        oxyCODONE (OXYCONTIN) extended release tablet 20 mg  20 mg Oral Once Mich Chavez MD        ortho mix (with morphine) injection   Injection On Call Mich Chavez MD        tranexamic acid (CYKLOKAPRON) 1,500 mg in sodium chloride 0.9 % 50 mL IVPB  1,500 mg Intravenous Once Mich Chavez MD        lactated ringers infusion   Intravenous Continuous Marlis Habermann,         HYDROmorphone (DILAUDID) injection 0.25 mg  0.25 mg Intravenous Q5 Min PRN Marlis Habermann, DO        HYDROmorphone (DILAUDID) injection 0.5 mg  0.5 mg Intravenous Q5 Min PRN Marlis Habermann, DO        HYDROcodone-acetaminophen [facility]) 5-325 MG per tablet 1 tablet  1 tablet Oral Once PRN Marlis Habermann, DO        diphenhydrAMINE (BENADRYL) injection 12.5 mg  12.5 mg Intravenous Once PRN Marlis Habermann, DO        0.9 % sodium chloride bolus  500 mL Intravenous Once PRN Marlis Habermann, DO        ondansetron [facility]) injection 4 mg  4 mg Intravenous Once PRN Marlis Habermann, DO        prochlorperazine (COMPAZINE) injection 5 mg  5 mg Intravenous Once PRN Marlis Habermann, DO        hydrALAZINE (APRESOLINE) injection 5 mg  5 mg Intravenous Q10 Min PRN Marlis Habermann, DO        meperidine (DEMEROL) injection 12.5 mg  12.5 mg Intravenous Q5 Min PRN Marlis Habermann, DO        midazolam (VERSED) 2 MG/2ML injection             ropivacaine (NAROPIN) 0.5% injection             bupivacaine (PF) (MARCAINE) 0.5 % injection                Allergies:     Allergies   Allergen Reactions    Lisinopril      Cough       Problem List:    Patient Active Problem List   Diagnosis Code    Chronic pain of right knee M25.561, G89.29    Chronic pain of left knee M25.562, G89.29    Closed fracture of tibial plateau with delayed healing S82.143G    Tear of medial meniscus of left knee, current G65.301K       Past Medical History:        Diagnosis Date    Arthritis     GERD (gastroesophageal reflux disease)     High blood pressure        Past Surgical History:        Procedure Laterality Date    CARPAL TUNNEL RELEASE Bilateral 2014    KNEE SURGERY Right 1995    Scope    SHOULDER SURGERY Right     9 years age   Heddie Catena SINUS SURGERY      WISDOM TOOTH EXTRACTION         Social History:    Social History     Tobacco Use    Smoking status: Never Smoker    Smokeless tobacco: Never Used   Substance Use Topics    Alcohol use: Yes     Alcohol/week: 1.0 standard drinks     Types: 1 Cans of beer per week                                Counseling given: Not Answered      Vital Signs (Current):   Vitals:    06/09/20 1529 06/15/20 0659   BP:  (!) 155/85   Pulse:  86   Resp:  18   Temp:  98.2 °F (36.8 °C)   TempSrc:  Oral   SpO2:  97%   Weight: 275 lb (124.7 kg) 275 lb (124.7 kg)   Height: 5' 11\" (1.803 m) 5' 11\" (1.803 m)                                              BP Readings from Last 3 Encounters:   06/15/20 (!) 155/85   03/11/20 (!) 147/92   02/04/20 (!) 133/95       NPO Status: Time of last liquid consumption: 2000                        Time of last solid consumption: 2000                        Date of last liquid consumption: 06/14/20                        Date of last solid food consumption: 06/14/20    BMI:   Wt Readings from Last 3 Encounters:   06/15/20 275 lb (124.7 kg)   03/11/20 278 lb (126.1 kg)   02/04/20 274 lb 14.6 oz (124.7 kg)     Body mass index is 38.35 kg/m².     CBC:   Lab Results   Component Value Date    WBC 12.5 03/11/2020    RBC 5.07 03/11/2020    HGB 14.9 03/11/2020    HCT 44.6 03/11/2020    MCV 88.0 03/11/2020    RDW 13.2 03/11/2020     03/11/2020       CMP:   Lab Results   Component Value Date     03/11/2020    K 3.8 03/11/2020     03/11/2020    CO2 24 03/11/2020    BUN 21 03/11/2020    CREATININE 1.0 03/11/2020    GFRAA >60 03/11/2020    LABGLOM >60 03/11/2020    GLUCOSE 137 03/11/2020    CALCIUM 9.1 03/11/2020       POC Tests: No results for input(s): POCGLU, POCNA, POCK, POCCL, POCBUN, POCHEMO, POCHCT in the last 72 hours. Coags:   Lab Results   Component Value Date    PROTIME 12.1 03/11/2020    INR 1.04 03/11/2020    APTT 30.3 03/11/2020       HCG (If Applicable): No results found for: PREGTESTUR, PREGSERUM, HCG, HCGQUANT     ABGs: No results found for: PHART, PO2ART, AIV0XCR, PMB3LNB, BEART, B4PBKKID     Type & Screen (If Applicable):  No results found for: LABABO, LABRH    Drug/Infectious Status (If Applicable):  No results found for: HIV, HEPCAB    COVID-19 Screening (If Applicable):   Lab Results   Component Value Date    COVID19 Not Detected 06/09/2020         Anesthesia Evaluation  Patient summary reviewed and Nursing notes reviewed no history of anesthetic complications:   Airway: Mallampati: II  TM distance: >3 FB   Neck ROM: full  Mouth opening: > = 3 FB Dental: normal exam         Pulmonary: breath sounds clear to auscultation  (+) sleep apnea (probable):      (-) not a current smoker (never)                           Cardiovascular:  Exercise tolerance: good (>4 METS),   (+) hypertension: moderate,     (-) past MI    NYHA Classification: II    Rhythm: regular  Rate: normal           Beta Blocker:  Not on Beta Blocker         Neuro/Psych:               GI/Hepatic/Renal:   (+) GERD: well controlled,           Endo/Other:                     Abdominal:   (+) obese,         Vascular:                                        Anesthesia Plan      general and regional     ASA 3       Induction: intravenous. MIPS: Postoperative opioids intended and Prophylactic antiemetics administered. Anesthetic plan and risks discussed with patient. Plan discussed with CRNA.     Attending anesthesiologist reviewed and agrees with Pre Eval content              Jenn Joseph DO   6/15/2020

## 2020-06-15 NOTE — PROGRESS NOTES
Patient arrived from OR to PACU # 15 s/p RIGHT KNEE ARTHROPLASTY LEATHA, LEFT KNEE CORTICOSTEROID INJECTION (Right ) per . Attached to PACU monitoring device, report received from CRNA who stated patient had pre-op adductor canal block and it was working well. Noted patient grimacing on arrival, c/o pain when asked. SEE MAR for administration of pain medication.

## 2020-06-15 NOTE — ANESTHESIA PROCEDURE NOTES
Dr. Eleanor Rios standing near nurses desk stated that pt desired to be consulted to pulmonologist. Stated he would put in the consult. artery. 20cc total volume injected. Negative aspiration for heme prior to injection and at several points during injection. Procedure tolerated well. No apparent complications.   Medications Administered  Bupivacaine liposome (EXPAREL) 1.3 % injection, 10 mL  Reason for block: post-op pain management and at surgeon's request

## 2020-06-15 NOTE — PROGRESS NOTES
Occupational Therapy   Occupational Therapy Initial Assessment/Treatment  Date: 6/15/2020   Patient Name: Mary Cr  MRN: [id number]     : 1965    Date of Service: 6/15/2020    Discharge Recommendations:  Mary Cr scored a 20/24 on the -[facility] ADL Inpatient form. At this time, no further OT is recommended upon discharge. Recommend patient returns to prior setting with prior services. OT Equipment Recommendations  Equipment Needed: No  Other: pt states plan to purchase shower chair if needed- will sponge bathe initially    Assessment   Performance deficits / Impairments: Decreased functional mobility ; Decreased ADL status; Decreased ROM  Assessment: Pt evaluated POD #0 following R TKA. Pt demo mobility with RW and cga, min A for LB ADLs. Anticipate pt will progress well post-operatively. Will follow for acute OT to progress ADLs and mobility for ADLs. Anticiapte no skilled OT needs upon d/c, wife plans to provide initial 24hr assist  Treatment Diagnosis: Impaired ADLs and mobility s/p R TKA  Decision Making: Low Complexity  OT Education: OT Role;Plan of Care;Transfer Training;ADL Adaptive Strategies;Precautions; Equipment  REQUIRES OT FOLLOW UP: Yes  Activity Tolerance  Activity Tolerance: Patient Tolerated treatment well  Safety Devices  Safety Devices in place: Yes  Type of devices: Nurse notified;Call light within reach; Chair alarm in place; Left in chair(wife present)           Treatment Diagnosis: Impaired ADLs and mobility s/p R TKA      Restrictions  Position Activity Restriction  Other position/activity restrictions: WBAT, Knee Immobilizer    Subjective   General  Chart Reviewed: Yes  Additional Pertinent Hx: Pt is a 53 y.o. male adm 6/15 s/p right total knee arthroplasty with computer assisted Darryl surgery. PMH: HTN, arthritis, GERD, R shoulder surgery, R knee scope   Referring Practitioner:  ISHMAEL Davis  Diagnosis: R knee OA  Subjective  Subjective: Pt in bed, agreeable placement    ADL  Grooming: Stand by assistance(hand hygiene at sink)  LE Dressing: Minimal assistance(don underwear long-sitting in bed, educated on modified technique)  Toileting: Contact guard assistance(attempted to urinate seated on toilet, clothing mgmt)   ADL Education: Pt educated on DME recommendations and modified ADLs for increased safety/independence with ADLs. Discussed safe shower transfer with initial family assist vs sponge bathing        Bed mobility  Supine to Sit: Stand by assistance(HOB elevated)     Transfers  Sit to stand: Contact guard assistance(bed)  Stand to sit: Contact guard assistance(chair)  Transfer Comments: Required VC for safe hand placement using walker        Cognition  Overall Cognitive Status: WFL(a little groggy from surgery)        LUE AROM (degrees)  LUE AROM : WFL  RUE AROM (degrees)  RUE AROM : WFL  LUE Strength  Gross LUE Strength: WFL  RUE Strength  Gross RUE Strength: WFL           Treatment consisted of:  ADLs, transfer training, education on activity promotion and fall precautions.      Plan   Plan  Times per week: 7x  Times per day: Daily      AM-PAC Score  AM-[facility] Inpatient Daily Activity Raw Score: 20 (06/15/20 1640)  AM-PAC Inpatient ADL T-Scale Score : 42.03 (06/15/20 1640)  ADL Inpatient CMS 0-100% Score: 38.32 (06/15/20 1640)  ADL Inpatient CMS G-Code Modifier : Rick Chavez (06/15/20 1640)    Goals  Short term goals  Time Frame for Short term goals: by discharge  Short term goal 1: doff/don underwear/shorts with supervision- not met  Short term goal 2: supervision toilet transfer- not met  Short term goal 3: supervision standing balance for ADLs at sink- not met       Therapy Time   Individual Concurrent Group Co-treatment   Time In 1420         Time Out 1501         Minutes 41         Timed Code Treatment Minutes:   26  Total Treatment Minutes:   [address] OTR/L, [id number]

## 2020-06-15 NOTE — PROGRESS NOTES
Hobbies: hunt, fish, kayak, boat  Additional Comments: sx was delayed 13 weeks- reports 5 falls during that time due to knee pain and locking up while using cane walking in yard  Cognition        Objective          AROM RLE (degrees)  RLE AROM: (R knee approximately 14-90 degrees)  AROM LLE (degrees)  LLE AROM : WFL  Strength RLE  Strength RLE: WFL(able to SLR independently)  Strength LLE  Strength LLE: WFL        Bed mobility  Supine to Sit: Stand by assistance(HOB elevated)  Transfers  Sit to Stand: Contact guard assistance(cues for hand placement)  Stand to sit: Contact guard assistance(cues for safety and hand placement)  Ambulation  Ambulation?: Yes  Ambulation 1  Device: Rolling Walker  Other Apparatus: Knee Immobilizer  Assistance: Contact guard assistance  Quality of Gait: cues for sequencing, decreased stance time RLE, step to pattern  Distance: 10', 25'         Exercises  Quad Sets: independent x 5 reps RLE  Heelslides: min assist x 5 reps RLE  Ankle Pumps: independent x 10 reps BLE   Treatment included: bed mobility, gt, transfers, exercises, pt education    Plan   Plan  Times per week: 7  Times per day: Twice a day  Current Treatment Recommendations: Strengthening, ROM, Functional Mobility Training, Gait Training, Stair training, Safety Education & Training, Patient/Caregiver Education & Training  Safety Devices  Type of devices: Call light within reach, Chair alarm in place, Nurse notified, Left in chair    G-Code       OutComes Score                                                  AM-PAC Score  AM-PAC Inpatient Mobility Raw Score : 18 (06/15/20 1603)  -PAC Inpatient T-Scale Score : 43.63 (06/15/20 1603)  Mobility Inpatient CMS 0-100% Score: 46.58 (06/15/20 1603)  Mobility Inpatient CMS G-Code Modifier : CK (06/15/20 1603)          Goals  Short term goals  Time Frame for Short term goals: By discharge  Short term goal 1: Sup to sit modified independent   Short term goal 2: Pt will transfer sit to stand supervision  Short term goal 3: Pt will amb >100' with LRAD supervision  Short term goal 4: Pt will up/down 4-8 steps with LRAD SBA  Short term goal 5: Pt will be independent with R TKR HEP x 10 reps       Therapy Time   Individual Concurrent Group Co-treatment   Time In 1422         Time Out 1500         Minutes 38              Timed Code Treatment Minutes:23       Total Treatment Minutes:  [address], PT

## 2020-06-16 VITALS
OXYGEN SATURATION: 97 % | TEMPERATURE: 98.7 F | HEART RATE: 91 BPM | BODY MASS INDEX: 38.5 KG/M2 | DIASTOLIC BLOOD PRESSURE: 87 MMHG | WEIGHT: 275 LBS | RESPIRATION RATE: 18 BRPM | SYSTOLIC BLOOD PRESSURE: 139 MMHG | HEIGHT: 71 IN

## 2020-06-16 PROBLEM — E66.01 CLASS 2 SEVERE OBESITY DUE TO EXCESS CALORIES WITH SERIOUS COMORBIDITY IN ADULT (HCC): Status: ACTIVE | Noted: 2020-06-16

## 2020-06-16 PROBLEM — R73.9 HYPERGLYCEMIA: Status: ACTIVE | Noted: 2020-06-16

## 2020-06-16 LAB
ANION GAP SERPL CALCULATED.3IONS-SCNC: 12 MMOL/L (ref 3–16)
BUN BLDV-MCNC: 22 MG/DL (ref 7–20)
CALCIUM SERPL-MCNC: 8.3 MG/DL (ref 8.3–10.6)
CHLORIDE BLD-SCNC: 98 MMOL/L (ref 99–110)
CO2: 21 MMOL/L (ref 21–32)
CREAT SERPL-MCNC: 1.1 MG/DL (ref 0.9–1.3)
GFR AFRICAN AMERICAN: >60
GFR NON-AFRICAN AMERICAN: >60
GLUCOSE BLD-MCNC: 210 MG/DL (ref 70–99)
HCT VFR BLD CALC: 35.1 % (ref 40.5–52.5)
HEMOGLOBIN: 12.1 G/DL (ref 13.5–17.5)
MCH RBC QN AUTO: 29.9 PG (ref 26–34)
MCHC RBC AUTO-ENTMCNC: 34.5 G/DL (ref 31–36)
MCV RBC AUTO: 86.6 FL (ref 80–100)
PDW BLD-RTO: 13 % (ref 12.4–15.4)
PLATELET # BLD: 265 K/UL (ref 135–450)
PMV BLD AUTO: 6.9 FL (ref 5–10.5)
POTASSIUM SERPL-SCNC: 3.7 MMOL/L (ref 3.5–5.1)
RBC # BLD: 4.05 M/UL (ref 4.2–5.9)
SODIUM BLD-SCNC: 131 MMOL/L (ref 136–145)
WBC # BLD: 13.4 K/UL (ref 4–11)

## 2020-06-16 PROCEDURE — 6370000000 HC RX 637 (ALT 250 FOR IP): Performed by: PHYSICIAN ASSISTANT

## 2020-06-16 PROCEDURE — 97530 THERAPEUTIC ACTIVITIES: CPT

## 2020-06-16 PROCEDURE — G0378 HOSPITAL OBSERVATION PER HR: HCPCS

## 2020-06-16 PROCEDURE — 97116 GAIT TRAINING THERAPY: CPT

## 2020-06-16 PROCEDURE — 85027 COMPLETE CBC AUTOMATED: CPT

## 2020-06-16 PROCEDURE — 97535 SELF CARE MNGMENT TRAINING: CPT

## 2020-06-16 PROCEDURE — 99218 PR INITIAL OBSERVATION CARE/DAY 30 MINUTES: CPT | Performed by: INTERNAL MEDICINE

## 2020-06-16 PROCEDURE — 80048 BASIC METABOLIC PNL TOTAL CA: CPT

## 2020-06-16 PROCEDURE — 83036 HEMOGLOBIN GLYCOSYLATED A1C: CPT

## 2020-06-16 PROCEDURE — 36415 COLL VENOUS BLD VENIPUNCTURE: CPT

## 2020-06-16 RX ORDER — OXYCODONE HYDROCHLORIDE AND ACETAMINOPHEN 5; 325 MG/1; MG/1
1 TABLET ORAL EVERY 6 HOURS PRN
Qty: 28 TABLET | Refills: 0 | Status: SHIPPED | OUTPATIENT
Start: 2020-06-16 | End: 2020-06-23 | Stop reason: SDUPTHER

## 2020-06-16 RX ORDER — SENNA AND DOCUSATE SODIUM 50; 8.6 MG/1; MG/1
1 TABLET, FILM COATED ORAL 2 TIMES DAILY
Qty: 30 TABLET | Refills: 0 | Status: SHIPPED | OUTPATIENT
Start: 2020-06-16 | End: 2020-07-14

## 2020-06-16 RX ORDER — INSULIN LISPRO 100 [IU]/ML
0-3 INJECTION, SOLUTION INTRAVENOUS; SUBCUTANEOUS NIGHTLY
Status: DISCONTINUED | OUTPATIENT
Start: 2020-06-16 | End: 2020-06-16 | Stop reason: HOSPADM

## 2020-06-16 RX ORDER — NICOTINE POLACRILEX 4 MG
15 LOZENGE BUCCAL PRN
Status: DISCONTINUED | OUTPATIENT
Start: 2020-06-16 | End: 2020-06-16 | Stop reason: HOSPADM

## 2020-06-16 RX ORDER — PREDNISONE 10 MG/1
10 TABLET ORAL DAILY
Qty: 10 TABLET | Refills: 0 | Status: SHIPPED | OUTPATIENT
Start: 2020-06-16 | End: 2020-06-24 | Stop reason: SDUPTHER

## 2020-06-16 RX ORDER — CEFADROXIL 500 MG/1
500 CAPSULE ORAL 2 TIMES DAILY
Qty: 20 CAPSULE | Refills: 0 | Status: SHIPPED | OUTPATIENT
Start: 2020-06-16 | End: 2020-06-26

## 2020-06-16 RX ORDER — DEXTROSE MONOHYDRATE 25 G/50ML
12.5 INJECTION, SOLUTION INTRAVENOUS PRN
Status: DISCONTINUED | OUTPATIENT
Start: 2020-06-16 | End: 2020-06-16 | Stop reason: HOSPADM

## 2020-06-16 RX ORDER — DEXTROSE MONOHYDRATE 50 MG/ML
100 INJECTION, SOLUTION INTRAVENOUS PRN
Status: DISCONTINUED | OUTPATIENT
Start: 2020-06-16 | End: 2020-06-16 | Stop reason: HOSPADM

## 2020-06-16 RX ORDER — INSULIN LISPRO 100 [IU]/ML
0-6 INJECTION, SOLUTION INTRAVENOUS; SUBCUTANEOUS
Status: DISCONTINUED | OUTPATIENT
Start: 2020-06-16 | End: 2020-06-16 | Stop reason: HOSPADM

## 2020-06-16 RX ADMIN — CETIRIZINE HYDROCHLORIDE 10 MG: 10 TABLET, FILM COATED ORAL at 08:01

## 2020-06-16 RX ADMIN — DOCUSATE SODIUM 50 MG AND SENNOSIDES 8.6 MG 1 TABLET: 8.6; 5 TABLET, FILM COATED ORAL at 10:08

## 2020-06-16 RX ADMIN — OXYCODONE 10 MG: 5 TABLET ORAL at 08:06

## 2020-06-16 RX ADMIN — ACETAMINOPHEN 650 MG: 325 TABLET ORAL at 04:27

## 2020-06-16 RX ADMIN — OXYCODONE 5 MG: 5 TABLET ORAL at 04:27

## 2020-06-16 RX ADMIN — ACETAMINOPHEN 650 MG: 325 TABLET ORAL at 08:01

## 2020-06-16 RX ADMIN — PANTOPRAZOLE SODIUM 40 MG: 40 TABLET, DELAYED RELEASE ORAL at 10:07

## 2020-06-16 RX ADMIN — HYDROCHLOROTHIAZIDE 25 MG: 25 TABLET ORAL at 08:01

## 2020-06-16 RX ADMIN — PREDNISONE 10 MG: 10 TABLET ORAL at 08:01

## 2020-06-16 RX ADMIN — LOSARTAN POTASSIUM 100 MG: 25 TABLET, FILM COATED ORAL at 10:07

## 2020-06-16 RX ADMIN — AMLODIPINE BESYLATE 5 MG: 5 TABLET ORAL at 08:01

## 2020-06-16 RX ADMIN — ASPIRIN 325 MG: 325 TABLET, COATED ORAL at 08:01

## 2020-06-16 ASSESSMENT — PAIN SCALES - GENERAL
PAINLEVEL_OUTOF10: 7
PAINLEVEL_OUTOF10: 4
PAINLEVEL_OUTOF10: 7
PAINLEVEL_OUTOF10: 0
PAINLEVEL_OUTOF10: 7

## 2020-06-16 ASSESSMENT — PAIN DESCRIPTION - PROGRESSION: CLINICAL_PROGRESSION: GRADUALLY WORSENING

## 2020-06-16 NOTE — PROGRESS NOTES
Sets: x10 RLE  Ankle Pumps: x10   AROM RLE (degrees)  RLE General AROM: R knee: 9-84 degrees                    G-Code     OutComes Score                                                     AM-PAC Score  AM-PAC Inpatient Mobility Raw Score : 18 (06/16/20 0832)  AM-PAC Inpatient T-Scale Score : 43.63 (06/16/20 2087)  Mobility Inpatient CMS 0-100% Score: 46.58 (06/16/20 8971)  Mobility Inpatient CMS G-Code Modifier : CK (06/16/20 9536)          Goals  Short term goals  Time Frame for Short term goals: By discharge  Short term goal 1: Sup to sit modified independent   Short term goal 2: Pt will transfer sit to stand supervision  Short term goal 3: Pt will amb >100' with LRAD supervision  Short term goal 4: Pt will up/down 4-8 steps with LRAD SBA  Short term goal 5: Pt will be independent with R TKR HEP x 10 reps    Plan    Plan  Times per week: 7  Times per day: Twice a day  Current Treatment Recommendations: Strengthening, ROM, Functional Mobility Training, Gait Training, Stair training, Safety Education & Training, Patient/Caregiver Education & Training  Safety Devices  Type of devices: Left in chair, Call light within reach, Chair alarm in place, Nurse notified     Therapy Time   Individual Concurrent Group Co-treatment   Time In 0753         Time Out 0819         Minutes [address] DIANA Hanson     Met goals 3, 4, and 5.     Sergio Casper, [facility]

## 2020-06-16 NOTE — PLAN OF CARE
Problem: Falls - Risk of:  Goal: Will remain free from falls  Description: Will remain free from falls  6/16/2020 0938 by Alexandra Ferguson RN  Outcome: Ongoing  Note: Patient alert and oriented X4, non-skid socks on, bed in lowest position and locked, side rails up X2, call light and belongings within reach, bed alarm on for safety, and fall sign posted. Will continue to monitor. 6/16/2020 0303 by Shayy Oneal RN  Outcome: Ongoing     Problem: Pain:  Goal: Pain level will decrease  Description: Pain level will decrease  6/16/2020 0938 by Alexandra Ferguson RN  Outcome: Ongoing  Note: Pt was complaining of 7 out of 10 pain to right knee. Pt was medicated per MAR. Upon reassessment pt pain had decreased. Will continue to monitor.

## 2020-06-16 NOTE — CARE COORDINATION
Case Management Assessment            Discharge Note                    Date / Time of Note: 6/16/2020 4:09 PM                  Discharge Note Completed by: Sheryle Frame     Patient left before I could talk with him about d/c plans. I called him and he has agreed to home care with [facility]. I called to make the referral and they have pulled orders for start of care. Patient had no DME needs. Patient Name: Raoul Hooper   YOB: 1965  Diagnosis: Status post total knee replacement, right [Z96.651]   Date / Time: 6/15/2020  6:43 AM    Current PCP: [facility] [address] patient: No    Hospitalization in the last 30 days: No    Advance Directives:  Code Status: Prior  [place] DNR form completed and on chart: No    Financial:  Payor: Lenny Burch / Plan: BCBS - [place] PPO / Product Type: *No Product type* /      Pharmacy:    [address] 222 [address] [address]  Phone: [phone] Fax: [phone]      Assistance purchasing medications?: Potential Assistance Purchasing Medications: No  Assistance provided by Case Management: None at this time    Does patient want to participate in local refill/ meds to beds program?: Yes    Meds To Beds General Rules:  1. Can ONLY be done Monday- Friday between 8:30am-5pm  2. Prescription(s) must be in pharmacy by 3pm to be filled same day  3. Copy of patient's insurance/ prescription drug card and patient face sheet must be sent along with the prescription(s)  4. Cost of Rx cannot be added to hospital bill. If financial assistance is needed, please contact unit  or ;  or  CANNOT provide pharmacy voucher for patients co-pays  5.  Patients can then  the prescription on their way out of the hospital at discharge, or pharmacy can deliver to the bedside if staff is available. (payment due at time of pick-up or delivery - cash, check, or card accepted)     Able to afford home medications/ co-pay costs: Yes    ADLS:  Current PT AM-PAC Score: 18 /24  Current OT AM-PAC Score: 23 /24      DISCHARGE Disposition: [address]: [facility] [address]     LOC at discharge: Not Applicable  HU Completed: Not Indicated    Notification completed in HENS/PAS?:  Not Applicable    IMM Completed:   Not Indicated    Transportation:  Transportation PLAN for discharge: family   Mode of Transport: [address]:  [address] ordered at discharge: Yes  [address]: [facility]  Phone: [phone]  Fax: [phone]  Orders faxed: No-able to pull orders from [facility]      The Plan for Transition of Care is related to the following treatment goals of Status post total knee replacement, right [Z96.651]    The Patient and/or patient representative Keenan Lara and his family were provided with a choice of provider and agrees with the discharge plan Yes    Freedom of choice list was provided with basic dialogue that supports the patient's individualized plan of care/goals and shares the quality data associated with the providers.  Yes    Care Transitions patient: No    Lonnell Hodgkin, RN  [facility] [employer]  Case Management Department  Ph: [phone]  Fax: [phone]

## 2020-06-16 NOTE — PROGRESS NOTES
doff/don underwear/shorts with supervision- not met  Short term goal 2: supervision toilet transfer- not met  Short term goal 3: supervision standing balance for ADLs at sink- not met       Therapy Time   Individual Concurrent Group Co-treatment   Time In 0902         Time Out 0940         Minutes 38         Timed Code Treatment Minutes: 38 Minutes   Total Treatment minutes: 38 min     [address] [place] [address], Ne, DONNELLY/L

## 2020-06-17 ENCOUNTER — TELEPHONE (OUTPATIENT)
Dept: ORTHOPEDIC SURGERY | Age: 55
End: 2020-06-17

## 2020-06-17 LAB
ESTIMATED AVERAGE GLUCOSE: 131.2 MG/DL
HBA1C MFR BLD: 6.2 %

## 2020-06-19 ENCOUNTER — TELEPHONE (OUTPATIENT)
Dept: ORTHOPEDIC SURGERY | Age: 55
End: 2020-06-19

## 2020-06-22 RX ORDER — ZOLPIDEM TARTRATE 5 MG/1
5 TABLET ORAL NIGHTLY PRN
Qty: 14 TABLET | Refills: 0 | Status: SHIPPED | OUTPATIENT
Start: 2020-06-22 | End: 2020-07-06

## 2020-06-23 ENCOUNTER — TELEPHONE (OUTPATIENT)
Dept: ORTHOPEDIC SURGERY | Age: 55
End: 2020-06-23

## 2020-06-23 DIAGNOSIS — Z96.651 STATUS POST TOTAL KNEE REPLACEMENT, RIGHT: ICD-10-CM

## 2020-06-23 RX ORDER — OXYCODONE HYDROCHLORIDE AND ACETAMINOPHEN 5; 325 MG/1; MG/1
1 TABLET ORAL EVERY 6 HOURS PRN
Qty: 28 TABLET | Refills: 0 | Status: SHIPPED | OUTPATIENT
Start: 2020-06-23 | End: 2020-06-30

## 2020-06-24 ENCOUNTER — OFFICE VISIT (OUTPATIENT)
Dept: ORTHOPEDIC SURGERY | Age: 55
End: 2020-06-24

## 2020-06-24 VITALS
HEART RATE: 85 BPM | WEIGHT: 274.91 LBS | SYSTOLIC BLOOD PRESSURE: 130 MMHG | BODY MASS INDEX: 38.49 KG/M2 | HEIGHT: 71 IN | TEMPERATURE: 96.8 F | DIASTOLIC BLOOD PRESSURE: 83 MMHG

## 2020-06-24 PROCEDURE — 99024 POSTOP FOLLOW-UP VISIT: CPT | Performed by: ORTHOPAEDIC SURGERY

## 2020-06-24 RX ORDER — PREDNISONE 10 MG/1
10 TABLET ORAL DAILY
Qty: 10 TABLET | Refills: 0 | Status: SHIPPED | OUTPATIENT
Start: 2020-06-24 | End: 2020-07-04

## 2020-06-24 NOTE — PROGRESS NOTES
Patient Name: Arline Stephenson MRN: <[id number]>   Age: 53 y.o. YOB: 1965   Sex: male         CHIEF COMPLAINT   Total Knee postoperative visit    HISTORY OF PRESENT ILLNESS   Patient returns for their first postoperative evaluation status post total knee replacement. The patient is doing very good. They have small complaints of pain. There is small swelling about the knee. The patient has been doing physical therapy at home. They deny any calf swelling or numbness or tingling down the leg       Assessment     Review of Systems   Constitutional: Negative for chills and fever. HENT: Negative for nosebleeds. Eyes: Negative for double vision. Cardiovascular: Negative for chest pain. Gastrointestinal: Negative for abdominal pain. Musculoskeletal: Positive for joint pain and myalgias. Skin: Negative for rash. Neurological: Negative for seizures. Psychiatric/Behavioral: Negative for hallucinations. .     PHYSICAL EXAM     Vital Signs:   Vitals:    06/24/20 1022   BP: 130/83   Pulse: 85   Temp: 96.8 °F (36 °C)       Examination of the knee shows a well-healed midline incision. There is small swelling. There is no drainage. Range of motion is 0to 90. The patient is neurovascularly intact. NEUROLOGICALLY: There is no evidence for sensory or motor deficits in the extremity. Coordination appears full with no spacticity or rigidity. Reflexes appear to be symmetric. Distal circulation intact. No signs of RSD. Calf nontender. Negative delma's,  no signs of dvt    Hip exam shows full ROM with no focal pain or Instability. Leg lengths are normal. There is no Trendelenburg Gait. RADIOLOGY   Xray   Have reviewed the xrays above from 06/24/20   3 views of the right knee AP, lateral and sunrise views were performed and reviewed today and my impression is: Well-placed total knee prosthetic no evidence of loosening or fracture.     IMPRESSION   1 week(s) status post total knee replacement    PLAN   The patient is doing well 1 week(s) status post total knee replacement. The patient will finish up therapy. They may slowly resume normal activities.   Physical therapy prescription given    The orders below, if any, were placed during this visit:          ICD-10-CM    1. Status post total knee replacement, right Z96.651 XR KNEE RIGHT (3 VIEWS)         Kenan Calle MD

## 2020-06-29 ENCOUNTER — HOSPITAL ENCOUNTER (OUTPATIENT)
Dept: PHYSICAL THERAPY | Age: 55
Setting detail: THERAPIES SERIES
Discharge: HOME | End: 2020-06-29
Payer: COMMERCIAL

## 2020-06-29 PROCEDURE — 97161 PT EVAL LOW COMPLEX 20 MIN: CPT | Performed by: CHIROPRACTOR

## 2020-06-29 PROCEDURE — G0283 ELEC STIM OTHER THAN WOUND: HCPCS | Performed by: CHIROPRACTOR

## 2020-06-29 PROCEDURE — 97530 THERAPEUTIC ACTIVITIES: CPT | Performed by: CHIROPRACTOR

## 2020-06-29 NOTE — PLAN OF CARE
Outpatient Physical Therapy  [] [facility]    Phone: [phone]   Fax: [phone]   [] [facility]  Phone: [phone]              Fax: [phone]  [] [facility]   Phone: [phone]   Fax: [phone]     To: Referring Practitioner: Dr. Dory Davila      Patient: Niya Patel   : 1965   MRN: [id number]  Evaluation Date: 2020      Diagnosis Information:  · Diagnosis: R TKA   ·       Physical Therapy Certification/Re-Certification Form  Dear Roseanne Apley  The following patient has been evaluated for physical therapy services and for therapy to continue, Medicare requires monthly physician review of the treatment plan. Please review the attached evaluation and/or summary of the patient's plan of care, and verify that you agree therapy should continue by signing the attached document and sending it back to our office. Plan of Care/Treatment to date:  [x] Therapeutic Exercise    [x] Modalities:  [x] Therapeutic Activity     [] Ultrasound  [x] Electrical Stimulation  [] Gait Training      [] Cervical Traction [] Lumbar Traction  [] Neuromuscular Re-education    [x] Cold/hotpack [] Iontophoresis   [x] Instruction in HEP     Other:  [] Manual Therapy      []             [] Aquatic Therapy      []           ? Frequency/Duration:  # Days per week: [x] 1 day # Weeks: [] 1 week [] 5 weeks     [x] 2 days? [] 2 weeks [] 6 weeks     [] 3 days   [] 3 weeks [] 7 weeks     [] 4 days   [x] 4 weeks [] 8 weeks    Rehab Potential: [] Excellent [x] Good [] Fair  [] Poor       Electronically signed by:  Loretta DYER#[id number]      If you have any questions or concerns, please don't hesitate to call.   Thank you for your referral.      Physician Signature:________________________________Date:__________________  By signing above, therapists plan is approved by physician

## 2020-06-29 NOTE — FLOWSHEET NOTE
exer  Short term goal 2: PROM: WNL  Short term goal 3: Decrease pain 25-50%      Long term goals  Time Frame for Long term goals : 4 weeks  Long term goal 1: Amb without any assistive device  Long term goal 2: Decrease pain 50-75%  Long term goal 3: Up/down stairs with recip gait     Patient Requires Follow-up: [] Yes  [] No    Plan:   [x] Continue per plan of care [] Alter current plan (see comments)  [] Plan of care initiated [] Hold pending MD visit [] Discharge    Plan for Next Session:  ROM, strengthening    Electronically signed by:  Benny Pichardo #[id number]

## 2020-07-07 ENCOUNTER — HOSPITAL ENCOUNTER (OUTPATIENT)
Dept: PHYSICAL THERAPY | Age: 55
Setting detail: THERAPIES SERIES
Discharge: HOME | End: 2020-07-07
Payer: COMMERCIAL

## 2020-07-07 PROCEDURE — 97110 THERAPEUTIC EXERCISES: CPT

## 2020-07-07 PROCEDURE — G0283 ELEC STIM OTHER THAN WOUND: HCPCS

## 2020-07-07 NOTE — PROGRESS NOTES
Physical Therapy Progress  Date:  2020    Patient Name:  Iva Hankins    :  1965  MRN: [id number]    Restrictions/Precautions:    Restrictions/Precautions: Fall Risk(Low fall risk)    Pertinent Medical History:     Arthritis, HTN    Medical/Treatment Diagnosis Information:   Right TKR   6/15/20     Physician Information:    Dr. Lucia Jackson    Visit# / total visits:  2/4  Pain level: 1-2/10       History of Injury:   R TKA 6/15/20                                 Subjective:  Feels that he is doing well    Objective:     PROM . Strength - able to perform 6\" step up.    Ambulating without AD    Assessment  [x] Patient tolerated treatment well [] Patient limited by fatigue  [] Patient limited by pain  [] Patient limited by other medical complications  [] Other:       Plan:   [x] Continue per plan of care [] Alter current plan (see comments)  [] Plan of care initiated [] Hold pending MD visit [] Discharge       Electronically signed by:  Brody Nicole, [facility]

## 2020-07-07 NOTE — FLOWSHEET NOTE
Physical Therapy Daily Treatment Note  Date:  2020    Patient Name:  Geovanny Shepherd    :  1965  MRN: [id number]    Restrictions/Precautions:    Restrictions/Precautions: Fall Risk(Low fall risk)    Pertinent Medical History:     Arthritis, HTN    Medical/Treatment Diagnosis Information:   Right TKr 6/15/20       Insurance/Certification information:     [facility]  Physician Information:    Dr. Maria C Gautiher of care signed (Y/N):  Inbox    Visit# / total visits:  2/4  Pain level: 1-2/10     Functional Outcomes Measure:  Test: LEFS  Score: 39    History of Injury:   R TKA 6/15/20                                  (States he will have L TKA in a few months)    Subjective:   C/o R knee pain and stiffness    Objective:   Observation:    Test measurements:  PROM:   (20)    Exercises:  Exercise/Equipment Resistance/Repetitions Other comments        Nustep  Level 2 LE only  5 min     Step knee flexion  Step HSS 10sec x 3  30 sec x 3    GS 30 sec x 3    Mini-squats add    Fitter    Step ups  Steps lat  Step down 1 thin F/B x 10    6\" x 10  6\" x 10  4' x 10    Leg Press 40\" 2 x 10    Knee extension  Knee flexion   11# R  2 X 10  11# R  2 X 10    Passive stretch X 5 flexion  X 5 extension          E-stim IFC with CP x 15 min    HEP  slr      Other Therapeutic Activities:      Home Exercise Program:      Manual Treatments:     Modalities: E-stim, CP    Progression Towards Functional goals:  [] Patient is progressing as expected towards functional goals listed. [] Progression is slowed due to complexities listed. [] Progression has been slowed due to co-morbidities. [x] Plan just implemented, too soon to assess goals progression  [] Other:    Charges: Therapeutic Exercise:  [] (19093) Provided verbal/tactile cueing for activities to restore or maintain strength, flexibility, endurance, ROM for improvements with self-care, mobility, lifting and ambulation.     Neuromuscular Re-Education  [] (09658) Provided verbal/tactile cueing for activities to restore or maintain balance, coordination, kinesthetic sense, posture, motor skill, proprioception for self-care, mobility, lifting, and ambulation. Therapeutic Activities:    [x] (68940) Provided verbal/tactile cueing to address functional limitations related to loss of mobility, strength, balance, and coordination. Gait Training:  [] (42845) Provided training and instruction to the patient for proper postural muscle recruitment and positioning with ambulation re-education     Home Exercise Program:    [x] (93599) Reviewed/Progressed HEP activities related to strengthening, flexibility, endurance, ROM for functional self-care, mobility, lifting and ambulation   [] (38133) Reviewed/Progressed HEP activities related to improving balance, coordination, kinesthetic sense, posture, motor skill, proprioception for self-care, mobility, lifting, and ambulation      Manual Treatments:  MFR / STM / Oscillations-Mobs:  G-I, II, III, IV / Manipulation / MLD  [] (68846) Provided manual therapy to mobilize  soft tissue/joints/fluid for the purpose of modulating pain, promoting relaxation, increasing ROM, reducing/eliminating soft tissue swelling/inflammation/restriction, improving soft tissue extensibility and allowing for proper ROM for normal function with self- care, mobility, lifting and ambulation.         Timed Code Treatment Minutes: 45   Total Treatment Minutes: 45     [] EVAL (LOW) 37391   [] EVAL (MOD) 97867   [] EVAL (HIGH) 84593   [] RE-EVAL   [x] TE (29557) x  2   [] Aquatic (16557) x  [] NMR (06292)   x  [] Aquatic Group (35444) x  [] Manual (87416) x    [] Ultrasound (37018) x  [] TA (10231) x  [] Mech Traction (42996)  [] Ionto (77992)           [x] ES (un) (50384):   [] Vasopump (06116) [] Other:      Assessment  [x] Patient tolerated treatment well [] Patient limited by fatigue  [] Patient limited by pain  [] Patient limited by other medical complications  [] Other:     Prognosis: [x] Good [] Fair  [] Poor    Goals:                 Patient Requires Follow-up: [] Yes  [] No    Plan:   [x] Continue per plan of care [] Alter current plan (see comments)  [] Plan of care initiated [] Hold pending MD visit [] Discharge    Plan for Next Session:  ROM, strengthening    Electronically signed by:  Malick Salmeron, [address]

## 2020-07-14 ENCOUNTER — HOSPITAL ENCOUNTER (OUTPATIENT)
Dept: PHYSICAL THERAPY | Age: 55
Setting detail: THERAPIES SERIES
Discharge: HOME | End: 2020-07-14
Payer: COMMERCIAL

## 2020-07-14 ENCOUNTER — OFFICE VISIT (OUTPATIENT)
Dept: ORTHOPEDIC SURGERY | Age: 55
End: 2020-07-14

## 2020-07-14 VITALS
HEIGHT: 70 IN | WEIGHT: 274 LBS | SYSTOLIC BLOOD PRESSURE: 125 MMHG | DIASTOLIC BLOOD PRESSURE: 89 MMHG | TEMPERATURE: 97.9 F | HEART RATE: 76 BPM | BODY MASS INDEX: 39.22 KG/M2

## 2020-07-14 PROCEDURE — G0283 ELEC STIM OTHER THAN WOUND: HCPCS | Performed by: CHIROPRACTOR

## 2020-07-14 PROCEDURE — 99024 POSTOP FOLLOW-UP VISIT: CPT | Performed by: ORTHOPAEDIC SURGERY

## 2020-07-14 PROCEDURE — 97110 THERAPEUTIC EXERCISES: CPT | Performed by: CHIROPRACTOR

## 2020-07-14 RX ORDER — OXYCODONE HYDROCHLORIDE AND ACETAMINOPHEN 5; 325 MG/1; MG/1
1 TABLET ORAL EVERY 6 HOURS PRN
Qty: 28 TABLET | Refills: 0 | Status: SHIPPED | OUTPATIENT
Start: 2020-07-14 | End: 2020-07-21

## 2020-07-14 NOTE — PROGRESS NOTES
Patient Name: Krystle Palomares MRN: <[id number]>   Age: 53 y.o. YOB: 1965   Sex: male         CHIEF COMPLAINT   Total Knee postoperative visit    HISTORY OF PRESENT ILLNESS   Patient returns for their third postoperative evaluation status post total knee replacement. The patient is doing very good. They have small complaints of pain. There is moderate swelling about the knee. The patient has been doing physical therapy at outpatient basis. They deny any calf swelling or numbness or tingling down the leg       Assessment   Review of Systems   Constitutional: Negative for chills and fever. HENT: Negative for nosebleeds. Eyes: Negative for double vision. Cardiovascular: Negative for chest pain. Gastrointestinal: Negative for abdominal pain. Musculoskeletal: Positive for joint pain and myalgias. Skin: Negative for rash. Neurological: Negative for seizures. Psychiatric/Behavioral: Negative for hallucinations. PHYSICAL EXAM     Vital Signs:   Vitals:    07/14/20 1139   BP: 125/89   Pulse: 76   Temp: 97.9 °F (36.6 °C)       Examination of the knee shows a well-healed midline incision. There is moderate swelling. There is no drainage. Range of motion is 0to 110. The patient is neurovascularly intact. NEUROLOGICALLY: There is no evidence for sensory or motor deficits in the extremity. Coordination appears full with no spacticity or rigidity. Reflexes appear to be symmetric. Distal circulation intact. No signs of RSD. Calf nontender. Negative delma's,  no signs of dvt    Hip exam shows full ROM with no focal pain or Instability. Leg lengths are normal. There is no Trendelenburg Gait. RADIOLOGY   Xray   Have reviewed the xrays above from 07/14/20   and my impression is:    IMPRESSION   1 month(s) status post total knee replacement    PLAN   The patient is doing well 1 month(s) status post total knee replacement. The patient will finish up therapy. They may slowly resume normal activities.       The orders below, if any, were placed during this visit:          ICD-10-CM    1. Status post total knee replacement, right  U62.660          Josee Smith MD

## 2020-07-14 NOTE — FLOWSHEET NOTE
Physical Therapy Daily Treatment Note  Date:  2020    Patient Name:  Shaneka Mitchell    :  1965  MRN: [id number]    Restrictions/Precautions:    Restrictions/Precautions: Fall Risk(Low fall risk)    Pertinent Medical History:     Arthritis, HTN    Medical/Treatment Diagnosis Information:   Right TKR  6/15/20       Insurance/Certification information:     [facility]  Physician Information:    Dr. Gwendel Merlin of care signed (Y/N):  Inbox    Visit# / total visits:  3/4  Pain level: 1-2/10     Functional Outcomes Measure:  Test: LEFS  Score: 39    History of Injury:   R TKA 6/15/20                                  (States he will have L TKA in a few months)    Subjective:   Main complaint    Objective:   Observation:    Test measurements:  PROM:   (20)    Exercises:  Exercise/Equipment Resistance/Repetitions Other comments        Nustep (R LE and B UE) 5 min     Step knee flexion  Step HSS 10sec x 3  30 sec x 3    GS 30 sec x 3    Mini-squats x10    Fitter    Step ups  Steps lat  Step down 1 thin F/B x 10    6\" x 10  6\" x 10  4' x 10    Leg Press 40\" 2 x 10    Knee extension  Knee flexion   15#  - 2x10     R  15#  - 2x10     R    Passive stretch X 5 flexion  X 5 extension          E-stim IFC with CP x 15 min    HEP  slr      Other Therapeutic Activities:      Home Exercise Program:      Manual Treatments:     Modalities: E-stim, CP    Progression Towards Functional goals:  [] Patient is progressing as expected towards functional goals listed. [] Progression is slowed due to complexities listed. [] Progression has been slowed due to co-morbidities. [x] Plan just implemented, too soon to assess goals progression  [] Other:    Charges: Therapeutic Exercise:  [] (36698) Provided verbal/tactile cueing for activities to restore or maintain strength, flexibility, endurance, ROM for improvements with self-care, mobility, lifting and ambulation.     Neuromuscular Re-Education  [] (59778) Provided verbal/tactile cueing for activities to restore or maintain balance, coordination, kinesthetic sense, posture, motor skill, proprioception for self-care, mobility, lifting, and ambulation. Therapeutic Activities:    [x] (64942) Provided verbal/tactile cueing to address functional limitations related to loss of mobility, strength, balance, and coordination. Gait Training:  [] (71162) Provided training and instruction to the patient for proper postural muscle recruitment and positioning with ambulation re-education     Home Exercise Program:    [x] (14355) Reviewed/Progressed HEP activities related to strengthening, flexibility, endurance, ROM for functional self-care, mobility, lifting and ambulation   [] (38030) Reviewed/Progressed HEP activities related to improving balance, coordination, kinesthetic sense, posture, motor skill, proprioception for self-care, mobility, lifting, and ambulation      Manual Treatments:  MFR / STM / Oscillations-Mobs:  G-I, II, III, IV / Manipulation / MLD  [] (67704) Provided manual therapy to mobilize  soft tissue/joints/fluid for the purpose of modulating pain, promoting relaxation, increasing ROM, reducing/eliminating soft tissue swelling/inflammation/restriction, improving soft tissue extensibility and allowing for proper ROM for normal function with self- care, mobility, lifting and ambulation.         Timed Code Treatment Minutes: 30   Total Treatment Minutes: 45     [] EVAL (LOW) 81435   [] EVAL (MOD) 54293   [] EVAL (HIGH) 51819   [] RE-EVAL   [x] TE (84485) x  2   [] Aquatic (30458) x  [] NMR (39877)   x  [] Aquatic Group (05550) x  [] Manual (42499) x    [] Ultrasound (02813) x  [] TA (40469) x  [] Mech Traction (66597)  [] Ionto (95627)           [x] ES (un) (64169):   [] Vasopump (60617) [] Other:      Assessment  [x] Patient tolerated treatment well [] Patient limited by fatigue  [] Patient limited by pain  [] Patient limited by other medical complications  [] Other:     Prognosis: [x] Good [] Fair  [] Poor    Goals:                 Patient Requires Follow-up: [] Yes  [] No    Plan:   [x] Continue per plan of care [] Alter current plan (see comments)  [] Plan of care initiated [] Hold pending MD visit [] Discharge    Plan for Next Session:  Possible DC after next visit    Electronically signed by:  Rebeca PETERSON#[id number]

## 2020-07-21 ENCOUNTER — HOSPITAL ENCOUNTER (OUTPATIENT)
Dept: PHYSICAL THERAPY | Age: 55
Setting detail: THERAPIES SERIES
Discharge: HOME | End: 2020-07-21
Payer: COMMERCIAL

## 2020-07-21 PROCEDURE — G0283 ELEC STIM OTHER THAN WOUND: HCPCS | Performed by: CHIROPRACTOR

## 2020-07-21 PROCEDURE — 97110 THERAPEUTIC EXERCISES: CPT | Performed by: CHIROPRACTOR

## 2020-07-21 NOTE — FLOWSHEET NOTE
flexibility, endurance, ROM for improvements with self-care, mobility, lifting and ambulation. Neuromuscular Re-Education  [] (18892) Provided verbal/tactile cueing for activities to restore or maintain balance, coordination, kinesthetic sense, posture, motor skill, proprioception for self-care, mobility, lifting, and ambulation. Therapeutic Activities:    [x] (35527) Provided verbal/tactile cueing to address functional limitations related to loss of mobility, strength, balance, and coordination. Gait Training:  [] (15047) Provided training and instruction to the patient for proper postural muscle recruitment and positioning with ambulation re-education     Home Exercise Program:    [x] (06139) Reviewed/Progressed HEP activities related to strengthening, flexibility, endurance, ROM for functional self-care, mobility, lifting and ambulation   [] (68235) Reviewed/Progressed HEP activities related to improving balance, coordination, kinesthetic sense, posture, motor skill, proprioception for self-care, mobility, lifting, and ambulation      Manual Treatments:  MFR / STM / Oscillations-Mobs:  G-I, II, III, IV / Manipulation / MLD  [] (54872) Provided manual therapy to mobilize  soft tissue/joints/fluid for the purpose of modulating pain, promoting relaxation, increasing ROM, reducing/eliminating soft tissue swelling/inflammation/restriction, improving soft tissue extensibility and allowing for proper ROM for normal function with self- care, mobility, lifting and ambulation.         Timed Code Treatment Minutes: 30   Total Treatment Minutes: 45     [] EVAL (LOW) 60955   [] EVAL (MOD) 54920   [] EVAL (HIGH) 31122   [] RE-EVAL   [x] TE (23254) x  2   [] Aquatic (90490) x  [] NMR (06524)   x  [] Aquatic Group (38357) x  [] Manual (12839) x    [] Ultrasound (32083) x  [] TA (79452) x  [] Mech Traction (44442)  [] Ionto (43113)           [x] ES (un) (93923):   [] Vasopump (85104) [] Other:      Assessment  [x] Patient tolerated treatment well [] Patient limited by fatigue  [] Patient limited by pain  [] Patient limited by other medical complications  [] Other:     Prognosis: [x] Good [] Fair  [] Poor    Goals:                 Patient Requires Follow-up: [] Yes  [x] No    Plan:   [] Continue per plan of care [] Alter current plan (see comments)  [] Plan of care initiated [] Hold pending MD visit [x] Discharge (Pt wishes to cont exer on his own)    Plan for Next Session:  DC to HEP    Electronically signed by:  La Rodriguez [id number]

## 2020-07-21 NOTE — DISCHARGE SUMMARY
Physical Therapy Discharge Note  Date: 2020    Patient Name: Lien Bennett  : 1965  MRN: [id number]    Diagnosis:   R TKA    Referring Physician: Dr. Marc Briscoe    Dates of Service:   20  Total # of Visits:   4  Cancel/No Shows to date:  0      Functional Outcomes Measures: at discharge  Test: LEFS  Score: 64    Treatment to Date:  [x] Therapeutic Exercise  [x] Modalities:  [x] Therapeutic Activity     [] Ultrasound  [] Electrical Stim   [] Gait Training      [] Cervical Traction    [] Lumbar Traction  [] Neuromuscular Re-education  [] Cold/hotpack [] Iontophoresis  [x] Instruction in HEP      Other:  [x] Manual Therapy       []    [] Aquatic Therapy       []                            Significant Findings At Last Visit:       PROM: 5-120      Strength: 4+/5        Overall Response to Treatment:  [x] Patient responded well to treatment and improvement is noted with regards to functional goals              []Patient should continue to improve in reasonable time if they continue HEP              []Patient has plateaued and is no longer responding to skilled PT intervention                        []Patient is getting worse and would benefit from return to referring MD              []Patient unable to adhere to initial POC              []Other:     Goals:    Goal Status:  [x] Achieved [] Partially Achieved  [] Not Achieved     Reason for Discharge:  [x] Goals Met   [] Patient did not return after initial evaluation   [] Progress Plateaued [] Missed _____ scheduled appointments   [] No insurance coverage [] Patient terminated therapy   [] Other:        PT recommendation:   [x] Patient now discharged with HEP      [] Other:    Discharge discussed with:  [x] Patient  [] Caregiver       [x] Other: Pt wishes to cont to exer on his own        Electronically signed by:  Cabrera Elise [id number]    If you have any questions or concerns, please don't hesitate to call.   Thank you for your referral.

## 2020-08-11 ENCOUNTER — OFFICE VISIT (OUTPATIENT)
Dept: ORTHOPEDIC SURGERY | Age: 55
End: 2020-08-11

## 2020-08-11 VITALS
TEMPERATURE: 97.9 F | BODY MASS INDEX: 39.22 KG/M2 | HEIGHT: 70 IN | DIASTOLIC BLOOD PRESSURE: 86 MMHG | HEART RATE: 99 BPM | SYSTOLIC BLOOD PRESSURE: 128 MMHG | WEIGHT: 274 LBS

## 2020-08-11 PROCEDURE — 99024 POSTOP FOLLOW-UP VISIT: CPT | Performed by: PHYSICIAN ASSISTANT

## 2020-08-11 RX ORDER — TRAMADOL HYDROCHLORIDE 50 MG/1
50 TABLET ORAL EVERY 4 HOURS PRN
Qty: 42 TABLET | Refills: 0 | Status: SHIPPED | OUTPATIENT
Start: 2020-08-11 | End: 2020-09-08

## 2020-08-11 RX ORDER — VITAMIN A, VITAMIN C, VITAMIN D-3, VITAMIN E, VITAMIN B-1, VITAMIN B-2, NIACIN, VITAMIN B-6, CALCIUM, IRON, ZINC, COPPER 4000; 120; 400; 22; 1.84; 3; 20; 10; 1; 12; 200; 27; 25; 2 [IU]/1; MG/1; [IU]/1; MG/1; MG/1; MG/1; MG/1; MG/1; MG/1; UG/1; MG/1; MG/1; MG/1; MG/1
1 TABLET ORAL 2 TIMES DAILY
Qty: 60 TABLET | Refills: 2 | Status: SHIPPED | OUTPATIENT
Start: 2020-08-11 | End: 2020-12-09 | Stop reason: SDUPTHER

## 2020-08-11 NOTE — PROGRESS NOTES
Patient Name: Aisha Charles MRN: <[id number]>   Age: 53 y.o. YOB: 1965   Sex: male         CHIEF COMPLAINT   Total Knee postoperative visit    HISTORY OF PRESENT ILLNESS   Patient returns for their fourth postoperative evaluation status post total knee replacement. The patient is doing very good. They have No complaints of pain regarding the right knee. Notes that there is pain regarding the left knee and has been unable to do a whole lot of exercises with that knee due to increase aggravation of pain when he does so. Notes he has been continuing to do his exercises for his right knee and is doing very well with those. There is small swelling about the knee. The patient has been doing physical therapy at outpatient basis. They deny any calf swelling or numbness or tingling down the leg       Assessment   Review of Systems   Constitutional: Negative for chills and fever. HENT: Negative for nosebleeds. Eyes: Negative for double vision. Cardiovascular: Negative for chest pain. Gastrointestinal: Negative for abdominal pain. Musculoskeletal: Positive for joint pain and myalgias. Skin: Negative for rash. Neurological: Negative for seizures. Psychiatric/Behavioral: Negative for hallucinations. PHYSICAL EXAM     Vital Signs:   Vitals:    08/11/20 0920   BP: 128/86   Pulse: 99   Temp: 97.9 °F (36.6 °C)       Examination of the knee shows a well-healed midline incision. There is small swelling. There is no drainage. Range of motion is 0 to 115. The patient is neurovascularly intact. NEUROLOGICALLY: There is no evidence for sensory or motor deficits in the extremity. Coordination appears full with no spacticity or rigidity. Reflexes appear to be symmetric. Distal circulation intact. No signs of RSD. Calf nontender. Negative delma's,  no signs of dvt    Hip exam shows full ROM with no focal pain or Instability.  Leg lengths are normal. There is no

## 2020-08-17 RX ORDER — TIZANIDINE 4 MG/1
TABLET ORAL
Qty: 60 TABLET | Refills: 1 | Status: SHIPPED | OUTPATIENT
Start: 2020-08-17 | End: 2020-10-06 | Stop reason: SDUPTHER

## 2020-08-26 ENCOUNTER — HOSPITAL ENCOUNTER (OUTPATIENT)
Dept: MRI IMAGING | Age: 55
Discharge: HOME | End: 2020-08-26
Payer: COMMERCIAL

## 2020-08-26 ENCOUNTER — TELEPHONE (OUTPATIENT)
Dept: ORTHOPEDIC SURGERY | Age: 55
End: 2020-08-26

## 2020-08-26 PROCEDURE — 73721 MRI JNT OF LWR EXTRE W/O DYE: CPT

## 2020-08-28 ENCOUNTER — HOSPITAL ENCOUNTER (OUTPATIENT)
Dept: CT IMAGING | Age: 55
Discharge: HOME | End: 2020-08-28
Payer: COMMERCIAL

## 2020-08-28 PROCEDURE — 73700 CT LOWER EXTREMITY W/O DYE: CPT

## 2020-09-04 NOTE — PROGRESS NOTES
[facility] [employer] / [facility] ([facility]) [address], [address]    Acknowledgment of Informed Consent for Surgical or Medical Procedure and Sedation  I agree to allow doctor(s) МАРИНА EVANS and his/her associates or assistants, including residents and/or other qualified medical practitioner to perform the following medical treatment or procedure and to administer or direct the administration of sedation as necessary:  Procedure(s): LEFT TOTAL KNEE ARTHROPLASTY  My doctor has explained the following regarding the proposed procedure:   the explanation of the procedure   the benefits of the procedure   the potential problems that might occur during recuperation   the risks and side effects of the procedure which could include but are not limited to severe blood loss, infection, stroke or death   the benefits, risks and side effect of alternative procedures including the consequences of declining this procedure or any alternative procedures   the likelihood of achieving satisfactory results. I acknowledge no guarantee or assurance has been made to me regarding the results. I understand that during the course of this treatment/procedure, unforeseen conditions can occur which require an additional or different procedure. I agree to allow my physician or assistants to perform such extension of the original procedure as they may find necessary. I understand that sedation will often result in temporary impairment of memory and fine motor skills and that sedation can occasionally progress to a state of deep sedation or general anesthesia. I understand the risks of anesthesia for surgery include, but are not limited to, sore throat, hoarseness, injury to face, mouth, or teeth; nausea; headache; injury to blood vessels or nerves; death, brain damage, or paralysis.     I understand that if I have a Limitation of Treatment order in effect during my hospitalization, the order may or may not be in effect during this procedure. I give my doctor permission to give me blood or blood products. I understand that there are risks with receiving blood such as hepatitis, AIDS, fever, or allergic reaction. I acknowledge that the risks, benefits, and alternatives of this treatment have been explained to me and that no express or implied warranty has been given by the hospital, any blood bank, or any person or entity as to the blood or blood components transfused. At the discretion of my doctor, I agree to allow observers, equipment/product representatives and allow photographing, and/or televising of the procedure, provided my name or identity is maintained confidentially. I agree the hospital may dispose of or use for scientific or educational purposes any tissue, fluid, or body parts which may be removed.     ________________________________Date________Time______ am/pm  ([place] [facility])  Patient or Signature of Closest Relative or Legal Guardian    ________________________________Date________Time______am/pm      Page 1 of  1  Witness

## 2020-09-08 ENCOUNTER — TELEPHONE (OUTPATIENT)
Dept: ORTHOPEDIC SURGERY | Age: 55
End: 2020-09-08

## 2020-09-08 ENCOUNTER — OFFICE VISIT (OUTPATIENT)
Dept: PRIMARY CARE CLINIC | Age: 55
End: 2020-09-08
Payer: COMMERCIAL

## 2020-09-08 LAB — SARS-COV-2, NAA: NOT DETECTED

## 2020-09-08 PROCEDURE — 99211 OFF/OP EST MAY X REQ PHY/QHP: CPT | Performed by: NURSE PRACTITIONER

## 2020-09-08 RX ORDER — TRAMADOL HYDROCHLORIDE 50 MG/1
TABLET ORAL
Qty: 42 TABLET | Refills: 0 | Status: SHIPPED | OUTPATIENT
Start: 2020-09-08 | End: 2020-09-15

## 2020-09-08 NOTE — TELEPHONE ENCOUNTER
Jamie Citizen: NPR  Date: 09/14/20  Type of SX: INPATIENT CHANGED TO OUTPATIENT  Location: [address] [id number]   SX area: [facility]  Insurance: [facility]

## 2020-09-09 ENCOUNTER — TELEPHONE (OUTPATIENT)
Dept: ORTHOPEDIC SURGERY | Age: 55
End: 2020-09-09

## 2020-09-09 ENCOUNTER — ANESTHESIA EVENT (OUTPATIENT)
Dept: OPERATING ROOM | Age: 55
DRG: 470 | End: 2020-09-09
Payer: COMMERCIAL

## 2020-09-10 NOTE — PROGRESS NOTES
Snoring? Do you snore loudly (loud enough to be heard through closed doors, or your bed partner elbows you for snoring at night)? Yes    Tired? Do you often feel tired, fatigued, or sleepy during the daytime (such as falling asleep during driving)? No    Observed? Has anyone observed you stop breathing or choking/gasping during your sleep? Yes    Pressure? Do you have or are being treated for high blood pressure? Yes    Neck Size? (measured around Ozs apple)  For male, is your shirt collar 17 inches or larger? For female, is your shirt collar 16 inches or larger? Yes    Age older than 48years old? Yes    Gender = Male  Yes    Body Mass Index more than 35 kg/m2? Yes    Risk of MARIEL Scoring criteria:    [] Low risk:  Yes to 0 - 2 questions    [] Intermediate risk:  Yes to 3 - 4 questions    [x] High risk:  Yes to 5 - 8 questions       For ALL PATIENTS THAT SCORE  5 or >:    Results called to OR Scheduling? yes    Patient given Sleep Apnea Referral Pamphlet?   Yes- gave to patient withlast joint replacement surgery

## 2020-09-10 NOTE — PROGRESS NOTES
[facility] PRE-SURGICAL TESTING INSTRUCTIONS                              PRIOR TO PROCEDURE DATE:  1. Please follow any guidelines/instructions prior to your procedure as advised by your surgeon. 2. Arrange for someone to drive you home and be with you for the first 24 hours after discharge for your safety after your procedure for which you received sedation. Ensure it is someone we can share information with regarding your discharge. 3. You must contact your surgeon for instructions IF:   You are taking any blood thinners, aspirin, anti-inflammatory or vitamin E.   There is a change in your physical condition such as a cold, fever, rash, cuts, sores or any other infection, especially near your surgical site. 4. Do not drink alcohol the day before or day of your procedure. 5. A Pre-op History and Physical for surgery MUST be completed by your Physician or Urgent Care within 30 days of your procedure date. Please bring a copy with you on the day of your procedure and along with any other testing performed. THE DAY OF YOUR PROCEDURE:  1. Follow instructions for ARRIVAL TIME as DIRECTED BY YOUR SURGEON. I    2. Enter the MAIN entrance from [employer] and follow the signs to the free [employer] or Home Le[employer] parking (offered free of charge 6am-5pm). 3. Enter the Main Entrance of the hospital (do not enter from the lower level of the parking garage). Upon entrance, check in with the  at the main desk on your left. If no one is available at the desk, proceed into the [facility] Waiting Room and go through the door directly into the [facility]. There is a Check-in desk ACROSS from Room 5 (marked with a sign hanging from the ceiling). The phone number for the surgery center is [phone]. 4. Please call [phone] option #2 option #2 if you have not been preregistered yet. On the day of your procedure bring your insurance card and photo ID.  You will be registered at your bedside once brought back to your room. 5. DO NOT EAT ANYTHING eight hours prior to surgery. May have 8 ounces of water 4 hours prior to surgery. 6. MEDICATIONS    Take the following medications with a SMALL sip of water: prilosec and norvasc   Use your usual dose of inhalers the morning of surgery. BRING your rescue inhaler with you to hospital.    Anesthesia does NOT want you to take insulin the morning of surgery. They will control your blood sugar while you are at the hospital. Please contact your ordering physician for instructions regarding your insulin the night before your procedure. If you have an insulin pump, please keep it set on basal rate. 7. Do not swallow water when brushing teeth. No gum, candy, mints or ice chips. Refrain from smoking or at least decrease the amount. 8. Dress in loose, comfortable clothing appropriate for redressing after your procedure. Do not wear jewelry (including body piercings), make-up (especially NO eye make-up), fingernail polish (NO toenail polish if foot/leg surgery), lotion, powders or metal hairclips. 9. Dentures, glasses, or contacts will need to be removed before your procedure. Bring cases for your glasses, contacts, dentures, or hearing aids to protect them while you are in surgery. 10. If you use a CPAP, please bring it with you on the day of your procedure. 11. We recommend that valuable personal  belongings such as cash, cell phones, e-tablets or jewelry, be left at home during your stay. The hospital will not be responsible for valuables that are not secured in the hospital safe. However, if your insurance requires a co-pay, you may want to bring a method of payment, i.e. Check or credit card, if you wish to pay your co-pay the day of surgery. 12. If you are to stay overnight, you may bring a bag with personal items.  Please have any large items you may need brought in by your family after your arrival to your hospital room. 15. If you have a Living Will or Durable Power of , please bring a copy on the day of your procedure. 15. With your permission, one family member may accompany you while you are being prepared for surgery. Once you are ready, additional family members may join you. HOW WE KEEP YOU SAFE and WORK TO PREVENT SURGICAL SITE INFECTIONS:  1. Health care workers should always check your ID bracelet to verify your name and birth date. You will be asked many times to state your name, date of birth, and allergies. 2. Health care workers should always clean their hands with soap or alcohol gel before providing care to you. It is okay to ask anyone if they cleaned their hands before they touch you. 3. You will be actively involved in verifying the type of procedure you are having and ensuring the correct surgical site. This will be confirmed multiple times prior to your procedure. Do NOT martha your surgery site UNLESS instructed to by your surgeon. 4. Do not shave or wax for 72 hours prior to procedure near your operative site. Shaving with a razor can irritate your skin and make it easier to develop an infection. On the day of your procedure, any hair that needs to be removed near the surgical site will be clipped by a healthcare worker using a special clippers designed to avoid skin irritation. 5. When you are in the operating room, your surgical site will be cleansed with a special soap, and in most cases, you will be given an antibiotic before the surgery begins. What to expect AFTER YOUR PROCEDURE:  1. Immediately following your procedure, your will be taken to the PACU for the first phase of your recovery. Your nurse will help you recover from any potential side effects of anesthesia, such as extreme drowsiness, changes in your vital signs or breathing patterns. Nausea, headache, muscle aches, or sore throat may also occur after anesthesia.   Your nurse will help you

## 2020-09-10 NOTE — PROGRESS NOTES
Total Joint Video was offered; however patient states he has viewed it with previous surgery in June 2020, and does not feel the need to view again.

## 2020-09-11 NOTE — PROGRESS NOTES
Spoke with Becki Alvarez at [facility], Jonathan Poon NP, requested H&P and EKG    Received the EKG report, not the image. Spoke with James, asked her to please fax H&P and EKG image.

## 2020-09-14 ENCOUNTER — ANESTHESIA (OUTPATIENT)
Dept: OPERATING ROOM | Age: 55
DRG: 470 | End: 2020-09-14
Payer: COMMERCIAL

## 2020-09-14 ENCOUNTER — HOSPITAL ENCOUNTER (INPATIENT)
Age: 55
LOS: 1 days | Discharge: HOME HEALTH | DRG: 470 | End: 2020-09-15
Attending: ORTHOPAEDIC SURGERY | Admitting: ORTHOPAEDIC SURGERY
Payer: COMMERCIAL

## 2020-09-14 ENCOUNTER — APPOINTMENT (OUTPATIENT)
Dept: GENERAL RADIOLOGY | Age: 55
DRG: 470 | End: 2020-09-14
Attending: ORTHOPAEDIC SURGERY
Payer: COMMERCIAL

## 2020-09-14 VITALS — SYSTOLIC BLOOD PRESSURE: 126 MMHG | DIASTOLIC BLOOD PRESSURE: 77 MMHG | OXYGEN SATURATION: 95 % | TEMPERATURE: 99.9 F

## 2020-09-14 PROBLEM — Z96.652 STATUS POST TOTAL KNEE REPLACEMENT NOT USING CEMENT, LEFT: Status: ACTIVE | Noted: 2020-09-14

## 2020-09-14 LAB
ABO/RH: NORMAL
ANION GAP SERPL CALCULATED.3IONS-SCNC: 10 MMOL/L (ref 3–16)
ANTIBODY SCREEN: NORMAL
APTT: 30 SEC (ref 24.2–36.2)
BASOPHILS ABSOLUTE: 0.1 K/UL (ref 0–0.2)
BASOPHILS RELATIVE PERCENT: 0.8 %
BUN BLDV-MCNC: 18 MG/DL (ref 7–20)
CALCIUM SERPL-MCNC: 9.4 MG/DL (ref 8.3–10.6)
CHLORIDE BLD-SCNC: 102 MMOL/L (ref 99–110)
CO2: 27 MMOL/L (ref 21–32)
CREAT SERPL-MCNC: 1 MG/DL (ref 0.9–1.3)
EOSINOPHILS ABSOLUTE: 0.1 K/UL (ref 0–0.6)
EOSINOPHILS RELATIVE PERCENT: 1.7 %
GFR AFRICAN AMERICAN: >60
GFR NON-AFRICAN AMERICAN: >60
GLUCOSE BLD-MCNC: 111 MG/DL (ref 70–99)
HCT VFR BLD CALC: 42.9 % (ref 40.5–52.5)
HEMOGLOBIN: 14.6 G/DL (ref 13.5–17.5)
LYMPHOCYTES ABSOLUTE: 2.2 K/UL (ref 1–5.1)
LYMPHOCYTES RELATIVE PERCENT: 29.1 %
MCH RBC QN AUTO: 28.7 PG (ref 26–34)
MCHC RBC AUTO-ENTMCNC: 34 G/DL (ref 31–36)
MCV RBC AUTO: 84.3 FL (ref 80–100)
MONOCYTES ABSOLUTE: 0.7 K/UL (ref 0–1.3)
MONOCYTES RELATIVE PERCENT: 9.5 %
NEUTROPHILS ABSOLUTE: 4.5 K/UL (ref 1.7–7.7)
NEUTROPHILS RELATIVE PERCENT: 58.9 %
PDW BLD-RTO: 13.4 % (ref 12.4–15.4)
PLATELET # BLD: 285 K/UL (ref 135–450)
PMV BLD AUTO: 6.8 FL (ref 5–10.5)
POTASSIUM SERPL-SCNC: 4 MMOL/L (ref 3.5–5.1)
RBC # BLD: 5.09 M/UL (ref 4.2–5.9)
SODIUM BLD-SCNC: 139 MMOL/L (ref 136–145)
WBC # BLD: 7.6 K/UL (ref 4–11)

## 2020-09-14 PROCEDURE — 6360000002 HC RX W HCPCS: Performed by: ANESTHESIOLOGY

## 2020-09-14 PROCEDURE — 64447 NJX AA&/STRD FEMORAL NRV IMG: CPT | Performed by: ANESTHESIOLOGY

## 2020-09-14 PROCEDURE — 85730 THROMBOPLASTIN TIME PARTIAL: CPT

## 2020-09-14 PROCEDURE — 2580000003 HC RX 258: Performed by: PHYSICIAN ASSISTANT

## 2020-09-14 PROCEDURE — 20985 CPTR-ASST DIR MS PX: CPT | Performed by: ORTHOPAEDIC SURGERY

## 2020-09-14 PROCEDURE — 80048 BASIC METABOLIC PNL TOTAL CA: CPT

## 2020-09-14 PROCEDURE — 86900 BLOOD TYPING SEROLOGIC ABO: CPT

## 2020-09-14 PROCEDURE — 2500000003 HC RX 250 WO HCPCS: Performed by: NURSE ANESTHETIST, CERTIFIED REGISTERED

## 2020-09-14 PROCEDURE — 27447 TOTAL KNEE ARTHROPLASTY: CPT | Performed by: PHYSICIAN ASSISTANT

## 2020-09-14 PROCEDURE — 27447 TOTAL KNEE ARTHROPLASTY: CPT | Performed by: ORTHOPAEDIC SURGERY

## 2020-09-14 PROCEDURE — 6360000002 HC RX W HCPCS: Performed by: ORTHOPAEDIC SURGERY

## 2020-09-14 PROCEDURE — 6370000000 HC RX 637 (ALT 250 FOR IP): Performed by: ORTHOPAEDIC SURGERY

## 2020-09-14 PROCEDURE — 6370000000 HC RX 637 (ALT 250 FOR IP): Performed by: PHYSICIAN ASSISTANT

## 2020-09-14 PROCEDURE — 2580000003 HC RX 258: Performed by: ANESTHESIOLOGY

## 2020-09-14 PROCEDURE — 2060000000 HC ICU INTERMEDIATE R&B

## 2020-09-14 PROCEDURE — 2720000010 HC SURG SUPPLY STERILE: Performed by: ORTHOPAEDIC SURGERY

## 2020-09-14 PROCEDURE — 7100000001 HC PACU RECOVERY - ADDTL 15 MIN: Performed by: ORTHOPAEDIC SURGERY

## 2020-09-14 PROCEDURE — 86901 BLOOD TYPING SEROLOGIC RH(D): CPT

## 2020-09-14 PROCEDURE — C1776 JOINT DEVICE (IMPLANTABLE): HCPCS | Performed by: ORTHOPAEDIC SURGERY

## 2020-09-14 PROCEDURE — 2500000003 HC RX 250 WO HCPCS: Performed by: ORTHOPAEDIC SURGERY

## 2020-09-14 PROCEDURE — 3700000001 HC ADD 15 MINUTES (ANESTHESIA): Performed by: ORTHOPAEDIC SURGERY

## 2020-09-14 PROCEDURE — 6360000002 HC RX W HCPCS: Performed by: NURSE ANESTHETIST, CERTIFIED REGISTERED

## 2020-09-14 PROCEDURE — G0378 HOSPITAL OBSERVATION PER HR: HCPCS

## 2020-09-14 PROCEDURE — 2709999900 HC NON-CHARGEABLE SUPPLY: Performed by: ORTHOPAEDIC SURGERY

## 2020-09-14 PROCEDURE — 94150 VITAL CAPACITY TEST: CPT

## 2020-09-14 PROCEDURE — 0SRD0JA REPLACEMENT OF LEFT KNEE JOINT WITH SYNTHETIC SUBSTITUTE, UNCEMENTED, OPEN APPROACH: ICD-10-PCS | Performed by: ORTHOPAEDIC SURGERY

## 2020-09-14 PROCEDURE — 3600000015 HC SURGERY LEVEL 5 ADDTL 15MIN: Performed by: ORTHOPAEDIC SURGERY

## 2020-09-14 PROCEDURE — 7100000000 HC PACU RECOVERY - FIRST 15 MIN: Performed by: ORTHOPAEDIC SURGERY

## 2020-09-14 PROCEDURE — 3700000000 HC ANESTHESIA ATTENDED CARE: Performed by: ORTHOPAEDIC SURGERY

## 2020-09-14 PROCEDURE — 86850 RBC ANTIBODY SCREEN: CPT

## 2020-09-14 PROCEDURE — 85025 COMPLETE CBC W/AUTO DIFF WBC: CPT

## 2020-09-14 PROCEDURE — 2580000003 HC RX 258: Performed by: ORTHOPAEDIC SURGERY

## 2020-09-14 PROCEDURE — 3600000005 HC SURGERY LEVEL 5 BASE: Performed by: ORTHOPAEDIC SURGERY

## 2020-09-14 PROCEDURE — C1713 ANCHOR/SCREW BN/BN,TIS/BN: HCPCS | Performed by: ORTHOPAEDIC SURGERY

## 2020-09-14 PROCEDURE — 8E0Y0CZ ROBOTIC ASSISTED PROCEDURE OF LOWER EXTREMITY, OPEN APPROACH: ICD-10-PCS | Performed by: ORTHOPAEDIC SURGERY

## 2020-09-14 PROCEDURE — 73560 X-RAY EXAM OF KNEE 1 OR 2: CPT

## 2020-09-14 DEVICE — COMPONENT FEM SZ 4 L KNEE TRITANIUM PERI APATITE POST STBL: Type: IMPLANTABLE DEVICE | Site: KNEE | Status: FUNCTIONAL

## 2020-09-14 DEVICE — COMPONENT TOT KNEE CAPPED PRIMARY K2STRYKER] STRYKER CORP]: Type: IMPLANTABLE DEVICE | Site: KNEE | Status: FUNCTIONAL

## 2020-09-14 DEVICE — Z DUP USE 2373673 TRITANIUM SYMMETRIC METAL BACKED PATELLA S36X10: Type: IMPLANTABLE DEVICE | Site: KNEE | Status: FUNCTIONAL

## 2020-09-14 DEVICE — BASEPLATE TIB SZ 5 AP49MM ML74MM KNEE TRITANIUM 4 CRUCFRM: Type: IMPLANTABLE DEVICE | Site: KNEE | Status: FUNCTIONAL

## 2020-09-14 RX ORDER — ACETAMINOPHEN 500 MG
1000 TABLET ORAL ONCE
Status: COMPLETED | OUTPATIENT
Start: 2020-09-14 | End: 2020-09-14

## 2020-09-14 RX ORDER — TIZANIDINE 4 MG/1
4 TABLET ORAL EVERY 8 HOURS PRN
Status: DISCONTINUED | OUTPATIENT
Start: 2020-09-14 | End: 2020-09-15 | Stop reason: HOSPADM

## 2020-09-14 RX ORDER — SENNA AND DOCUSATE SODIUM 50; 8.6 MG/1; MG/1
1 TABLET, FILM COATED ORAL 2 TIMES DAILY
Status: DISCONTINUED | OUTPATIENT
Start: 2020-09-14 | End: 2020-09-15 | Stop reason: HOSPADM

## 2020-09-14 RX ORDER — PREDNISONE 10 MG/1
10 TABLET ORAL DAILY
Status: DISCONTINUED | OUTPATIENT
Start: 2020-09-15 | End: 2020-09-15 | Stop reason: HOSPADM

## 2020-09-14 RX ORDER — DEXAMETHASONE SODIUM PHOSPHATE 4 MG/ML
10 INJECTION, SOLUTION INTRA-ARTICULAR; INTRALESIONAL; INTRAMUSCULAR; INTRAVENOUS; SOFT TISSUE ONCE
Status: COMPLETED | OUTPATIENT
Start: 2020-09-14 | End: 2020-09-14

## 2020-09-14 RX ORDER — LIDOCAINE HYDROCHLORIDE 20 MG/ML
INJECTION, SOLUTION INTRAVENOUS PRN
Status: DISCONTINUED | OUTPATIENT
Start: 2020-09-14 | End: 2020-09-14 | Stop reason: SDUPTHER

## 2020-09-14 RX ORDER — VANCOMYCIN HYDROCHLORIDE 1 G/20ML
INJECTION, POWDER, LYOPHILIZED, FOR SOLUTION INTRAVENOUS PRN
Status: DISCONTINUED | OUTPATIENT
Start: 2020-09-14 | End: 2020-09-14 | Stop reason: HOSPADM

## 2020-09-14 RX ORDER — ONDANSETRON 2 MG/ML
INJECTION INTRAMUSCULAR; INTRAVENOUS PRN
Status: DISCONTINUED | OUTPATIENT
Start: 2020-09-14 | End: 2020-09-14 | Stop reason: SDUPTHER

## 2020-09-14 RX ORDER — PREGABALIN 150 MG/1
150 CAPSULE ORAL ONCE
Status: COMPLETED | OUTPATIENT
Start: 2020-09-14 | End: 2020-09-14

## 2020-09-14 RX ORDER — OXYCODONE HYDROCHLORIDE 5 MG/1
10 TABLET ORAL EVERY 4 HOURS PRN
Status: DISCONTINUED | OUTPATIENT
Start: 2020-09-14 | End: 2020-09-15 | Stop reason: HOSPADM

## 2020-09-14 RX ORDER — DEXAMETHASONE SODIUM PHOSPHATE 4 MG/ML
INJECTION, SOLUTION INTRA-ARTICULAR; INTRALESIONAL; INTRAMUSCULAR; INTRAVENOUS; SOFT TISSUE PRN
Status: DISCONTINUED | OUTPATIENT
Start: 2020-09-14 | End: 2020-09-14 | Stop reason: SDUPTHER

## 2020-09-14 RX ORDER — MIDAZOLAM HYDROCHLORIDE 1 MG/ML
2 INJECTION INTRAMUSCULAR; INTRAVENOUS ONCE
Status: COMPLETED | OUTPATIENT
Start: 2020-09-14 | End: 2020-09-14

## 2020-09-14 RX ORDER — GLYCOPYRROLATE 1 MG/5 ML
SYRINGE (ML) INTRAVENOUS PRN
Status: DISCONTINUED | OUTPATIENT
Start: 2020-09-14 | End: 2020-09-14 | Stop reason: SDUPTHER

## 2020-09-14 RX ORDER — LOSARTAN POTASSIUM 100 MG/1
100 TABLET ORAL DAILY
Status: DISCONTINUED | OUTPATIENT
Start: 2020-09-15 | End: 2020-09-15 | Stop reason: HOSPADM

## 2020-09-14 RX ORDER — FENTANYL CITRATE 50 UG/ML
INJECTION, SOLUTION INTRAMUSCULAR; INTRAVENOUS PRN
Status: DISCONTINUED | OUTPATIENT
Start: 2020-09-14 | End: 2020-09-14 | Stop reason: SDUPTHER

## 2020-09-14 RX ORDER — ROPIVACAINE HYDROCHLORIDE 5 MG/ML
INJECTION, SOLUTION EPIDURAL; INFILTRATION; PERINEURAL
Status: COMPLETED
Start: 2020-09-14 | End: 2020-09-14

## 2020-09-14 RX ORDER — HYDROMORPHONE HCL 110MG/55ML
PATIENT CONTROLLED ANALGESIA SYRINGE INTRAVENOUS PRN
Status: DISCONTINUED | OUTPATIENT
Start: 2020-09-14 | End: 2020-09-14 | Stop reason: SDUPTHER

## 2020-09-14 RX ORDER — ACETAMINOPHEN 325 MG/1
650 TABLET ORAL EVERY 6 HOURS
Status: DISCONTINUED | OUTPATIENT
Start: 2020-09-14 | End: 2020-09-15 | Stop reason: HOSPADM

## 2020-09-14 RX ORDER — OXYCODONE HCL 10 MG/1
20 TABLET, FILM COATED, EXTENDED RELEASE ORAL ONCE
Status: COMPLETED | OUTPATIENT
Start: 2020-09-14 | End: 2020-09-14

## 2020-09-14 RX ORDER — FENTANYL CITRATE 50 UG/ML
50 INJECTION, SOLUTION INTRAMUSCULAR; INTRAVENOUS EVERY 5 MIN PRN
Status: DISCONTINUED | OUTPATIENT
Start: 2020-09-14 | End: 2020-09-14 | Stop reason: HOSPADM

## 2020-09-14 RX ORDER — OXYCODONE HYDROCHLORIDE 5 MG/1
5 TABLET ORAL EVERY 4 HOURS PRN
Status: DISCONTINUED | OUTPATIENT
Start: 2020-09-14 | End: 2020-09-15 | Stop reason: HOSPADM

## 2020-09-14 RX ORDER — LABETALOL 20 MG/4 ML (5 MG/ML) INTRAVENOUS SYRINGE
5 EVERY 10 MIN PRN
Status: DISCONTINUED | OUTPATIENT
Start: 2020-09-14 | End: 2020-09-14 | Stop reason: HOSPADM

## 2020-09-14 RX ORDER — PRENATAL WITH FERROUS FUM AND FOLIC ACID 3080; 920; 120; 400; 22; 1.84; 3; 20; 10; 1; 12; 200; 27; 25; 2 [IU]/1; [IU]/1; MG/1; [IU]/1; MG/1; MG/1; MG/1; MG/1; MG/1; MG/1; UG/1; MG/1; MG/1; MG/1; MG/1
1 TABLET ORAL 2 TIMES DAILY
Status: DISCONTINUED | OUTPATIENT
Start: 2020-09-14 | End: 2020-09-15 | Stop reason: HOSPADM

## 2020-09-14 RX ORDER — HYDRALAZINE HYDROCHLORIDE 20 MG/ML
5 INJECTION INTRAMUSCULAR; INTRAVENOUS EVERY 10 MIN PRN
Status: DISCONTINUED | OUTPATIENT
Start: 2020-09-14 | End: 2020-09-14 | Stop reason: HOSPADM

## 2020-09-14 RX ORDER — HYDROCHLOROTHIAZIDE 25 MG/1
25 TABLET ORAL DAILY
Status: DISCONTINUED | OUTPATIENT
Start: 2020-09-15 | End: 2020-09-15 | Stop reason: HOSPADM

## 2020-09-14 RX ORDER — MIDAZOLAM HYDROCHLORIDE 1 MG/ML
INJECTION INTRAMUSCULAR; INTRAVENOUS PRN
Status: DISCONTINUED | OUTPATIENT
Start: 2020-09-14 | End: 2020-09-14 | Stop reason: SDUPTHER

## 2020-09-14 RX ORDER — ONDANSETRON 2 MG/ML
4 INJECTION INTRAMUSCULAR; INTRAVENOUS
Status: DISCONTINUED | OUTPATIENT
Start: 2020-09-14 | End: 2020-09-14 | Stop reason: HOSPADM

## 2020-09-14 RX ORDER — CELECOXIB 200 MG/1
400 CAPSULE ORAL ONCE
Status: COMPLETED | OUTPATIENT
Start: 2020-09-14 | End: 2020-09-14

## 2020-09-14 RX ORDER — OXYCODONE HYDROCHLORIDE AND ACETAMINOPHEN 5; 325 MG/1; MG/1
1 TABLET ORAL PRN
Status: DISCONTINUED | OUTPATIENT
Start: 2020-09-14 | End: 2020-09-14 | Stop reason: HOSPADM

## 2020-09-14 RX ORDER — FENTANYL CITRATE 50 UG/ML
25 INJECTION, SOLUTION INTRAMUSCULAR; INTRAVENOUS EVERY 5 MIN PRN
Status: DISCONTINUED | OUTPATIENT
Start: 2020-09-14 | End: 2020-09-14 | Stop reason: HOSPADM

## 2020-09-14 RX ORDER — NEOSTIGMINE METHYLSULFATE 5 MG/5 ML
SYRINGE (ML) INTRAVENOUS PRN
Status: DISCONTINUED | OUTPATIENT
Start: 2020-09-14 | End: 2020-09-14 | Stop reason: SDUPTHER

## 2020-09-14 RX ORDER — SODIUM CHLORIDE 450 MG/100ML
INJECTION, SOLUTION INTRAVENOUS CONTINUOUS
Status: DISCONTINUED | OUTPATIENT
Start: 2020-09-14 | End: 2020-09-15 | Stop reason: HOSPADM

## 2020-09-14 RX ORDER — PROPOFOL 10 MG/ML
INJECTION, EMULSION INTRAVENOUS PRN
Status: DISCONTINUED | OUTPATIENT
Start: 2020-09-14 | End: 2020-09-14 | Stop reason: SDUPTHER

## 2020-09-14 RX ORDER — ROCURONIUM BROMIDE 10 MG/ML
INJECTION, SOLUTION INTRAVENOUS PRN
Status: DISCONTINUED | OUTPATIENT
Start: 2020-09-14 | End: 2020-09-14 | Stop reason: SDUPTHER

## 2020-09-14 RX ORDER — PANTOPRAZOLE SODIUM 40 MG/1
40 TABLET, DELAYED RELEASE ORAL
Status: DISCONTINUED | OUTPATIENT
Start: 2020-09-15 | End: 2020-09-15 | Stop reason: HOSPADM

## 2020-09-14 RX ORDER — FENTANYL CITRATE 50 UG/ML
100 INJECTION, SOLUTION INTRAMUSCULAR; INTRAVENOUS ONCE
Status: COMPLETED | OUTPATIENT
Start: 2020-09-14 | End: 2020-09-14

## 2020-09-14 RX ORDER — OXYCODONE HYDROCHLORIDE AND ACETAMINOPHEN 5; 325 MG/1; MG/1
2 TABLET ORAL PRN
Status: DISCONTINUED | OUTPATIENT
Start: 2020-09-14 | End: 2020-09-14 | Stop reason: HOSPADM

## 2020-09-14 RX ORDER — ROPIVACAINE HYDROCHLORIDE 5 MG/ML
INJECTION, SOLUTION EPIDURAL; INFILTRATION; PERINEURAL
Status: COMPLETED | OUTPATIENT
Start: 2020-09-14 | End: 2020-09-14

## 2020-09-14 RX ORDER — ONDANSETRON 2 MG/ML
4 INJECTION INTRAMUSCULAR; INTRAVENOUS EVERY 6 HOURS PRN
Status: DISCONTINUED | OUTPATIENT
Start: 2020-09-14 | End: 2020-09-15 | Stop reason: HOSPADM

## 2020-09-14 RX ORDER — CETIRIZINE HYDROCHLORIDE 10 MG/1
10 TABLET ORAL DAILY
Status: DISCONTINUED | OUTPATIENT
Start: 2020-09-15 | End: 2020-09-15 | Stop reason: HOSPADM

## 2020-09-14 RX ORDER — PROMETHAZINE HYDROCHLORIDE 12.5 MG/1
12.5 TABLET ORAL EVERY 6 HOURS PRN
Status: DISCONTINUED | OUTPATIENT
Start: 2020-09-14 | End: 2020-09-15 | Stop reason: HOSPADM

## 2020-09-14 RX ORDER — SUCCINYLCHOLINE/SOD CL,ISO/PF 200MG/10ML
SYRINGE (ML) INTRAVENOUS PRN
Status: DISCONTINUED | OUTPATIENT
Start: 2020-09-14 | End: 2020-09-14 | Stop reason: SDUPTHER

## 2020-09-14 RX ORDER — AMLODIPINE BESYLATE 2.5 MG/1
2.5 TABLET ORAL DAILY
Status: DISCONTINUED | OUTPATIENT
Start: 2020-09-15 | End: 2020-09-15 | Stop reason: HOSPADM

## 2020-09-14 RX ORDER — SODIUM CHLORIDE, SODIUM LACTATE, POTASSIUM CHLORIDE, CALCIUM CHLORIDE 600; 310; 30; 20 MG/100ML; MG/100ML; MG/100ML; MG/100ML
INJECTION, SOLUTION INTRAVENOUS CONTINUOUS
Status: DISCONTINUED | OUTPATIENT
Start: 2020-09-14 | End: 2020-09-14

## 2020-09-14 RX ORDER — SODIUM CHLORIDE 0.9 % (FLUSH) 0.9 %
10 SYRINGE (ML) INJECTION EVERY 12 HOURS SCHEDULED
Status: DISCONTINUED | OUTPATIENT
Start: 2020-09-14 | End: 2020-09-15 | Stop reason: HOSPADM

## 2020-09-14 RX ORDER — ZOLPIDEM TARTRATE 5 MG/1
5 TABLET ORAL NIGHTLY PRN
Status: DISCONTINUED | OUTPATIENT
Start: 2020-09-14 | End: 2020-09-15 | Stop reason: HOSPADM

## 2020-09-14 RX ORDER — SODIUM CHLORIDE 0.9 % (FLUSH) 0.9 %
10 SYRINGE (ML) INJECTION PRN
Status: DISCONTINUED | OUTPATIENT
Start: 2020-09-14 | End: 2020-09-15 | Stop reason: HOSPADM

## 2020-09-14 RX ADMIN — PROPOFOL 100 MG: 10 INJECTION, EMULSION INTRAVENOUS at 18:38

## 2020-09-14 RX ADMIN — OXYCODONE 10 MG: 5 TABLET ORAL at 20:26

## 2020-09-14 RX ADMIN — ROCURONIUM BROMIDE 30 MG: 10 INJECTION INTRAVENOUS at 16:01

## 2020-09-14 RX ADMIN — MIDAZOLAM 2 MG: 1 INJECTION INTRAMUSCULAR; INTRAVENOUS at 14:41

## 2020-09-14 RX ADMIN — Medication 5 MG: at 18:49

## 2020-09-14 RX ADMIN — DEXAMETHASONE SODIUM PHOSPHATE 8 MG: 4 INJECTION, SOLUTION INTRAMUSCULAR; INTRAVENOUS at 15:41

## 2020-09-14 RX ADMIN — MIDAZOLAM HYDROCHLORIDE 2 MG: 2 INJECTION, SOLUTION INTRAMUSCULAR; INTRAVENOUS at 15:31

## 2020-09-14 RX ADMIN — DEXAMETHASONE SODIUM PHOSPHATE 10 MG: 4 INJECTION, SOLUTION INTRAMUSCULAR; INTRAVENOUS at 14:04

## 2020-09-14 RX ADMIN — ACETAMINOPHEN 650 MG: 325 TABLET ORAL at 21:24

## 2020-09-14 RX ADMIN — ONDANSETRON 4 MG: 2 INJECTION INTRAMUSCULAR; INTRAVENOUS at 18:34

## 2020-09-14 RX ADMIN — FENTANYL CITRATE 50 MCG: 50 INJECTION INTRAMUSCULAR; INTRAVENOUS at 16:25

## 2020-09-14 RX ADMIN — Medication 4 MG: at 18:49

## 2020-09-14 RX ADMIN — CEFAZOLIN 3 G: 1 INJECTION, POWDER, FOR SOLUTION INTRAMUSCULAR; INTRAVENOUS at 15:49

## 2020-09-14 RX ADMIN — HYDROMORPHONE HYDROCHLORIDE 0.6 MG: 2 INJECTION, SOLUTION INTRAMUSCULAR; INTRAVENOUS; SUBCUTANEOUS at 16:28

## 2020-09-14 RX ADMIN — FENTANYL CITRATE 50 MCG: 50 INJECTION INTRAMUSCULAR; INTRAVENOUS at 15:34

## 2020-09-14 RX ADMIN — ROCURONIUM BROMIDE 10 MG: 10 INJECTION INTRAVENOUS at 15:40

## 2020-09-14 RX ADMIN — FENTANYL CITRATE 50 MCG: 50 INJECTION INTRAMUSCULAR; INTRAVENOUS at 18:48

## 2020-09-14 RX ADMIN — HYDROMORPHONE HYDROCHLORIDE 0.6 MG: 2 INJECTION, SOLUTION INTRAMUSCULAR; INTRAVENOUS; SUBCUTANEOUS at 16:34

## 2020-09-14 RX ADMIN — PROPOFOL 100 MG: 10 INJECTION, EMULSION INTRAVENOUS at 18:50

## 2020-09-14 RX ADMIN — SODIUM CHLORIDE, SODIUM LACTATE, POTASSIUM CHLORIDE, AND CALCIUM CHLORIDE: 600; 310; 30; 20 INJECTION, SOLUTION INTRAVENOUS at 18:34

## 2020-09-14 RX ADMIN — ROPIVACAINE HYDROCHLORIDE 20 ML: 5 INJECTION, SOLUTION EPIDURAL; INFILTRATION; PERINEURAL at 14:35

## 2020-09-14 RX ADMIN — ROCURONIUM BROMIDE 10 MG: 10 INJECTION INTRAVENOUS at 16:30

## 2020-09-14 RX ADMIN — Medication 10 ML: at 21:24

## 2020-09-14 RX ADMIN — CELECOXIB 400 MG: 200 CAPSULE ORAL at 13:59

## 2020-09-14 RX ADMIN — Medication 120 MG: at 15:41

## 2020-09-14 RX ADMIN — HYDROMORPHONE HYDROCHLORIDE 0.5 MG: 1 INJECTION, SOLUTION INTRAMUSCULAR; INTRAVENOUS; SUBCUTANEOUS at 19:33

## 2020-09-14 RX ADMIN — ACETAMINOPHEN 1000 MG: 500 TABLET ORAL at 13:59

## 2020-09-14 RX ADMIN — DOCUSATE SODIUM 50 MG AND SENNOSIDES 8.6 MG 1 TABLET: 8.6; 5 TABLET, FILM COATED ORAL at 21:24

## 2020-09-14 RX ADMIN — ASPIRIN 325 MG: 325 TABLET, COATED ORAL at 21:24

## 2020-09-14 RX ADMIN — Medication 0.2 MG: at 15:34

## 2020-09-14 RX ADMIN — TIZANIDINE 4 MG: 4 TABLET ORAL at 21:24

## 2020-09-14 RX ADMIN — LIDOCAINE HYDROCHLORIDE 100 MG: 20 INJECTION, SOLUTION INTRAVENOUS at 15:40

## 2020-09-14 RX ADMIN — HYDROMORPHONE HYDROCHLORIDE 0.4 MG: 2 INJECTION, SOLUTION INTRAMUSCULAR; INTRAVENOUS; SUBCUTANEOUS at 17:42

## 2020-09-14 RX ADMIN — PREGABALIN 150 MG: 150 CAPSULE ORAL at 13:58

## 2020-09-14 RX ADMIN — SODIUM CHLORIDE: 4.5 INJECTION, SOLUTION INTRAVENOUS at 21:25

## 2020-09-14 RX ADMIN — PROPOFOL 250 MG: 10 INJECTION, EMULSION INTRAVENOUS at 15:40

## 2020-09-14 RX ADMIN — SODIUM CHLORIDE, SODIUM LACTATE, POTASSIUM CHLORIDE, AND CALCIUM CHLORIDE: 600; 310; 30; 20 INJECTION, SOLUTION INTRAVENOUS at 13:58

## 2020-09-14 RX ADMIN — HYDROMORPHONE HYDROCHLORIDE 0.4 MG: 2 INJECTION, SOLUTION INTRAMUSCULAR; INTRAVENOUS; SUBCUTANEOUS at 17:08

## 2020-09-14 RX ADMIN — HYDROMORPHONE HYDROCHLORIDE 0.5 MG: 1 INJECTION, SOLUTION INTRAMUSCULAR; INTRAVENOUS; SUBCUTANEOUS at 19:27

## 2020-09-14 RX ADMIN — FENTANYL CITRATE 100 MCG: 50 INJECTION, SOLUTION INTRAMUSCULAR; INTRAVENOUS at 14:42

## 2020-09-14 RX ADMIN — TRANEXAMIC ACID 1.5 G: 1 INJECTION, SOLUTION INTRAVENOUS at 16:12

## 2020-09-14 RX ADMIN — OXYCODONE HYDROCHLORIDE 20 MG: 10 TABLET, FILM COATED, EXTENDED RELEASE ORAL at 13:58

## 2020-09-14 ASSESSMENT — PULMONARY FUNCTION TESTS
PIF_VALUE: 21
PIF_VALUE: 21
PIF_VALUE: 22
PIF_VALUE: 18
PIF_VALUE: 7
PIF_VALUE: 6
PIF_VALUE: 18
PIF_VALUE: 16
PIF_VALUE: 19
PIF_VALUE: 23
PIF_VALUE: 21
PIF_VALUE: 21
PIF_VALUE: 18
PIF_VALUE: 5
PIF_VALUE: 19
PIF_VALUE: 22
PIF_VALUE: 23
PIF_VALUE: 17
PIF_VALUE: 16
PIF_VALUE: 18
PIF_VALUE: 7
PIF_VALUE: 23
PIF_VALUE: 21
PIF_VALUE: 19
PIF_VALUE: 21
PIF_VALUE: 19
PIF_VALUE: 23
PIF_VALUE: 21
PIF_VALUE: 23
PIF_VALUE: 23
PIF_VALUE: 19
PIF_VALUE: 21
PIF_VALUE: 6
PIF_VALUE: 18
PIF_VALUE: 18
PIF_VALUE: 21
PIF_VALUE: 6
PIF_VALUE: 17
PIF_VALUE: 7
PIF_VALUE: 18
PIF_VALUE: 19
PIF_VALUE: 8
PIF_VALUE: 21
PIF_VALUE: 18
PIF_VALUE: 7
PIF_VALUE: 6
PIF_VALUE: 17
PIF_VALUE: 18
PIF_VALUE: 21
PIF_VALUE: 7
PIF_VALUE: 21
PIF_VALUE: 20
PIF_VALUE: 23
PIF_VALUE: 23
PIF_VALUE: 18
PIF_VALUE: 19
PIF_VALUE: 21
PIF_VALUE: 18
PIF_VALUE: 21
PIF_VALUE: 16
PIF_VALUE: 23
PIF_VALUE: 6
PIF_VALUE: 18
PIF_VALUE: 17
PIF_VALUE: 24
PIF_VALUE: 23
PIF_VALUE: 20
PIF_VALUE: 21
PIF_VALUE: 21
PIF_VALUE: 0
PIF_VALUE: 21
PIF_VALUE: 19
PIF_VALUE: 18
PIF_VALUE: 18
PIF_VALUE: 21
PIF_VALUE: 18
PIF_VALUE: 21
PIF_VALUE: 19
PIF_VALUE: 23
PIF_VALUE: 21
PIF_VALUE: 19
PIF_VALUE: 17
PIF_VALUE: 18
PIF_VALUE: 23
PIF_VALUE: 5
PIF_VALUE: 6
PIF_VALUE: 18
PIF_VALUE: 21
PIF_VALUE: 6
PIF_VALUE: 19
PIF_VALUE: 21
PIF_VALUE: 21
PIF_VALUE: 23
PIF_VALUE: 18
PIF_VALUE: 21
PIF_VALUE: 19
PIF_VALUE: 15
PIF_VALUE: 6
PIF_VALUE: 2
PIF_VALUE: 19
PIF_VALUE: 7
PIF_VALUE: 18
PIF_VALUE: 6
PIF_VALUE: 18
PIF_VALUE: 23
PIF_VALUE: 20
PIF_VALUE: 15
PIF_VALUE: 18
PIF_VALUE: 18
PIF_VALUE: 22
PIF_VALUE: 21
PIF_VALUE: 19
PIF_VALUE: 7
PIF_VALUE: 18
PIF_VALUE: 19
PIF_VALUE: 19
PIF_VALUE: 21
PIF_VALUE: 21
PIF_VALUE: 23
PIF_VALUE: 23
PIF_VALUE: 21
PIF_VALUE: 21
PIF_VALUE: 23
PIF_VALUE: 23
PIF_VALUE: 18
PIF_VALUE: 16
PIF_VALUE: 21
PIF_VALUE: 6
PIF_VALUE: 6
PIF_VALUE: 21
PIF_VALUE: 21
PIF_VALUE: 7
PIF_VALUE: 6
PIF_VALUE: 19
PIF_VALUE: 7
PIF_VALUE: 18
PIF_VALUE: 23
PIF_VALUE: 18
PIF_VALUE: 20
PIF_VALUE: 18
PIF_VALUE: 23
PIF_VALUE: 7
PIF_VALUE: 7
PIF_VALUE: 8
PIF_VALUE: 21
PIF_VALUE: 21
PIF_VALUE: 16
PIF_VALUE: 18
PIF_VALUE: 6
PIF_VALUE: 17
PIF_VALUE: 20
PIF_VALUE: 18
PIF_VALUE: 18
PIF_VALUE: 17
PIF_VALUE: 17
PIF_VALUE: 18
PIF_VALUE: 6
PIF_VALUE: 21
PIF_VALUE: 4
PIF_VALUE: 4
PIF_VALUE: 23
PIF_VALUE: 23
PIF_VALUE: 18
PIF_VALUE: 18
PIF_VALUE: 21
PIF_VALUE: 18
PIF_VALUE: 7
PIF_VALUE: 23
PIF_VALUE: 18
PIF_VALUE: 5
PIF_VALUE: 18
PIF_VALUE: 4
PIF_VALUE: 19
PIF_VALUE: 19
PIF_VALUE: 23
PIF_VALUE: 21
PIF_VALUE: 18
PIF_VALUE: 21
PIF_VALUE: 18
PIF_VALUE: 9
PIF_VALUE: 16
PIF_VALUE: 20
PIF_VALUE: 21
PIF_VALUE: 20
PIF_VALUE: 2
PIF_VALUE: 2
PIF_VALUE: 20
PIF_VALUE: 21
PIF_VALUE: 21
PIF_VALUE: 6
PIF_VALUE: 18
PIF_VALUE: 7
PIF_VALUE: 21
PIF_VALUE: 19
PIF_VALUE: 23
PIF_VALUE: 23
PIF_VALUE: 7

## 2020-09-14 ASSESSMENT — PAIN SCALES - GENERAL
PAINLEVEL_OUTOF10: 6
PAINLEVEL_OUTOF10: 3
PAINLEVEL_OUTOF10: 7
PAINLEVEL_OUTOF10: 5
PAINLEVEL_OUTOF10: 0
PAINLEVEL_OUTOF10: 6
PAINLEVEL_OUTOF10: 7
PAINLEVEL_OUTOF10: 3

## 2020-09-14 ASSESSMENT — LIFESTYLE VARIABLES: SMOKING_STATUS: 0

## 2020-09-14 ASSESSMENT — PAIN - FUNCTIONAL ASSESSMENT: PAIN_FUNCTIONAL_ASSESSMENT: 0-10

## 2020-09-14 NOTE — PROGRESS NOTES
Patient admitted to pacu post LEFT TOTAL KNEE ARTHROPLASTY - Left with Dr. Kathy Quijano. Patient connected to bedside monitors VSS. Per CRNA patient was stable intraop. Oral airway removed shortly after arrival. Xrays done at bedside. Patient has complaints of pain see MAR for details.

## 2020-09-14 NOTE — ANESTHESIA PRE PROCEDURE
Department of Anesthesiology  Preprocedure Note       Name:  Raimundo Ho   Age:  53 y.o.  :  1965                                          MRN:  [id number]         Date:  2020      Surgeon: Stacy Pappas):  Ludwin Dominguez MD    Procedure: Procedure(s):  LEFT TOTAL KNEE ARTHROPLASTY    Medications prior to admission:   Prior to Admission medications    Medication Sig Start Date End Date Taking?  Authorizing Provider   traMADol (ULTRAM) 50 MG tablet TAKE ONE TABLET EVERY 4 HOURS BY MOUTH AS NEEDED FOR PAIN 9/8/20 9/15/20 Yes ISHMAEL Holley   tiZANidine (ZANAFLEX) 4 MG tablet TAKE 1 TABLET BY MOUTH THREE TIMES DAILY 20  Yes ISHMAEL Schmitt   Prenatal Vit-Fe Fumarate-FA (PRENATAL VITAMIN PLUS LOW IRON) 27-1 MG TABS Take 1 tablet by mouth 2 times daily 8/11/20 9/10/20 Yes ISHMAEL Schmitt   cetirizine (ZYRTEC) 10 MG tablet Take 10 mg by mouth daily   Yes Historical Provider, MD   amLODIPine (NORVASC) 2.5 MG tablet Take 2.5 mg by mouth daily 10/16/19  Yes Historical Provider, MD   hydrochlorothiazide (HYDRODIURIL) 25 MG tablet 25 mg 18  Yes Historical Provider, MD   losartan (COZAAR) 100 MG tablet 100 mg daily 18  Yes Historical Provider, MD   omeprazole (PRILOSEC) 40 MG delayed release capsule 40 mg daily 18  Yes Historical Provider, MD   DICLOFENAC PO Take by mouth daily    Historical Provider, MD       Current medications:    Current Facility-Administered Medications   Medication Dose Route Frequency Provider Last Rate Last Dose    lactated ringers infusion   Intravenous Continuous Natalie DO Alondra        ceFAZolin (ANCEF) 3 g in dextrose 5 % 100 mL IVPB  3 g Intravenous Once Ludwin Dominguez MD        acetaminophen (TYLENOL) tablet 1,000 mg  1,000 mg Oral Once Ludwin Dominguez MD        celecoxib (CELEBREX) capsule 400 mg  400 mg Oral Once Ludwin Dominguez MD        Dexamethasone Sodium Phosphate injection 10 mg  10 mg Intravenous Once [facility] EDU given: Not Answered      Vital Signs (Current):   Vitals:    09/10/20 1555 09/14/20 1321   BP:  (!) 160/98   Pulse:  95   Resp:  18   Temp:  98.5 °F (36.9 °C)   TempSrc:  Oral   SpO2:  98%   Weight: 280 lb (127 kg) 280 lb (127 kg)   Height: 5' 11\" (1.803 m) 5' 11\" (1.803 m)                                              BP Readings from Last 3 Encounters:   09/14/20 (!) 160/98   08/11/20 128/86   07/14/20 125/89       NPO Status:                                                                                 BMI:   Wt Readings from Last 3 Encounters:   09/14/20 280 lb (127 kg)   08/11/20 274 lb (124.3 kg)   07/14/20 274 lb (124.3 kg)     Body mass index is 39.05 kg/m². CBC:   Lab Results   Component Value Date    WBC 13.4 06/16/2020    RBC 4.05 06/16/2020    HGB 12.1 06/16/2020    HCT 35.1 06/16/2020    MCV 86.6 06/16/2020    RDW 13.0 06/16/2020     06/16/2020       CMP:   Lab Results   Component Value Date     06/16/2020    K 3.7 06/16/2020    CL 98 06/16/2020    CO2 21 06/16/2020    BUN 22 06/16/2020    CREATININE 1.1 06/16/2020    GFRAA >60 06/16/2020    LABGLOM >60 06/16/2020    GLUCOSE 210 06/16/2020    CALCIUM 8.3 06/16/2020       POC Tests: No results for input(s): POCGLU, POCNA, POCK, POCCL, POCBUN, POCHEMO, POCHCT in the last 72 hours.     Coags:   Lab Results   Component Value Date    PROTIME 12.1 03/11/2020    INR 1.04 03/11/2020    APTT 30.3 03/11/2020       HCG (If Applicable): No results found for: PREGTESTUR, PREGSERUM, HCG, HCGQUANT     ABGs: No results found for: PHART, PO2ART, NCV5GXT, EFK6JOP, BEART, R6FZISFS     Type & Screen (If Applicable):  No results found for: LABABO, LABRH    Drug/Infectious Status (If Applicable):  No results found for: HIV, HEPCAB    COVID-19 Screening (If Applicable):   Lab Results   Component Value Date    COVID19 NOT DETECTED 09/08/2020         Anesthesia Evaluation  Patient summary reviewed  Airway: Mallampati: II  TM distance: >3 FB   Neck ROM: full  Mouth opening: > = 3 FB Dental:          Pulmonary: breath sounds clear to auscultation      (-) COPD, asthma, sleep apnea and not a current smoker                           Cardiovascular:    (+) hypertension:,     (-) past MI, CAD, CABG/stent, dysrhythmias,  angina,  CHF, orthopnea and PND      Rhythm: regular  Rate: normal                    Neuro/Psych:      (-) seizures, TIA and CVA           GI/Hepatic/Renal:   (+) GERD: well controlled, liver disease (TERRAZAS):, morbid obesity     (-) PUD and hepatitis       Endo/Other:    (+) Diabetes, no malignancy/cancer. (-) hypothyroidism, hyperthyroidism, no electrolyte abnormalities, no malignancy/cancer               Abdominal:           Vascular:     - DVT and PE. Anesthesia Plan      general and regional     ASA 3       Induction: intravenous. MIPS: Postoperative opioids intended and Prophylactic antiemetics administered. Anesthetic plan and risks discussed with patient.                       Gertie Boxer, MD   9/14/2020

## 2020-09-14 NOTE — ANESTHESIA PROCEDURE NOTES
Peripheral Block    Patient location during procedure: pre-op  Staffing  Anesthesiologist: Latha Crawford MD  Performed: anesthesiologist   Preanesthetic Checklist  Completed: patient identified, site marked, surgical consent, pre-op evaluation, timeout performed, IV checked, risks and benefits discussed, monitors and equipment checked, anesthesia consent given, oxygen available and patient being monitored  Peripheral Block  Patient position: supine  Prep: ChloraPrep  Patient monitoring: cardiac monitor, continuous pulse ox, frequent blood pressure checks and IV access  Block type: Femoral  Laterality: left  Injection technique: single-shot  Procedures: ultrasound guided  Local infiltration: lidocaine  Infiltration strength: 1 %  Dose: 3 mL  Adductor canal (Low Femoral)  Provider prep: mask and sterile gloves  Local infiltration: lidocaine  Needle  Needle gauge: 21 G  Needle length: 10 cm  Needle localization: ultrasound guidance  Assessment  Injection assessment: negative aspiration for heme, no paresthesia on injection and local visualized surrounding nerve on ultrasound  Paresthesia pain: none  Slow fractionated injection: yes  Hemodynamics: stable  Additional Notes  Immediately prior to procedure a \"time out\" was called to verify the correct patient, allergies, laterality, procedure and equipment. Time out performed with  RN    Local Anesthetic: 0.5 %  Ropivacaine   Amount: 20 ml  in 5 ml increments after negative aspiration each time. Iliopsoas Muscle and Fascia Iliaca, Femoral artery (Deep artery to the thigh take off), Femoral Vein and Femoral Nerve are identified; the tip of the need and the spread of the local anesthetic around the Femoral nerve are visualized. The Femoral nerve appeared to be anatomically normal and there were no abnormal pathologically findings seen.          Medications Administered  Ropivacaine (NAROPIN) injection 0.5%, 20 mL  Reason for block: post-op pain management and at surgeon's

## 2020-09-14 NOTE — OP NOTE
Operative Note      Patient: Macey Garcia  YOB: 1965  MRN: [id number]    Date of Procedure: 9/14/2020    Pre-Op Diagnosis: Primary osteoarthritis of left knee [M17.12]    Post-Op Diagnosis: Same       Procedure(s):  LEFT TOTAL KNEE ARTHROPLASTY    Surgeon(s):  Kleber Corrigan MD    Assistant:   Surgical Assistant: Kurtis Lorenzo Assistant: Beti Rick  Physician Assistant: ISHMAEL Hinkle    Anesthesia: General    Estimated Blood Loss (mL): 333    Complications: Bleeding    Specimens:   * No specimens in log *    Implants:  * No implants in log *      Drains: * No LDAs found *    Findings: knee with marked medial tightness     Detailed Description of Procedure: Total Knee Arthroplasty    Patient: Macey Garcia MRN: [id number]     YOB: 1965  Age: 53 y.o. Sex: male    Unit: [place] [facility] OR Room/Bed: OR/NONE Location: [address] [place] [address]     Admitting Physician: Heriberto Roblero   Primary Care Physician: Grace Koch          INDICATIONS: Severe osteoarthritis left knee with symptoms limiting function. DATE OF OPERATION: 09/14/20      SURGEON: Willi Dominguez    ASSISTANT(S): Surgeon(s):  Kleber Corrigan MD    ANESTHESIA: General endotrachial anesthesia    PREOPERATIVE DIAGNOSIS: leftsevere knee osteoarthritis    POSTOPERATIVE DIAGNOSIS:same    PROCEDURE PERFORMED: lefttotal knee arthroplasty with computer assisted Darryl surgery    Procedure Details   The patient was seen in the Holding Room. The risks, benefits, complications, treatment options, and expected outcomes were discussed with the patient. The risks and potential complications of their problem and purposed treatment include but are not limited to infection, bleeding, pain, stiffness, nerve and vessel injury and complication secondary to the anesthetic. The patient concurred with the proposed plan, giving informed consent. The site of surgery properly noted/marked.  The patient was taken to Operating Room, identified as Angela Pelaez and the procedure verified as leftTotal Knee Arthroplasty. A Time Out was held and the above information confirmed. The patient was brought to the operating room, placed on the operating table in a supine position. Preoperative blocks were obtained . Following the successful induction of anesthesia the left lower extremity was scrubbed, prepped and draped in the usual sterile fashion with ChloraPrep scrub and Betadine Steri-Drape. A routine longitudinal anterior incision was made. A medial dillon patellar incision   was performed. The patella was retracted laterally and the knee was examined. The medial collateral ligament was released from the proximal medial tibia. There was significant arthritis of the left knee with bone-on-bone contact on both the  medial femoral condyle and the  medial tibial plateau. There was moderate and severe degenerative changes involving the patella femoral joint. The patella was exposed and osteophytes removed from the patellar surface as well as the condylar surfaces. Patellar sizing was obtained and found to be a size24. Resection of the patella was done with oscillating saw. Drill holes were placed on the surface of the patella which was placed in a medial position lateral facetectomy was performed. Lateral release was doneno     Pins for the femoral and tibial tracking were placed in the wound for the femur times 2 and in the tibia outside the incision times two. Additional pins were placed for computer guidance in the metaphyseal bone of the tibia and the femur. All pins were removed prior to closure. Computer was aligned to the trackers, the pins and the surface anatomy of the femur and the tibia according to [employer]. With retractors in place, bony cuts were made to the femur with computer assitance of the femur and the tibial components.    Final positions and rom as well as overall stability were checked through the computer tracking. A balanced flexion/extension gap with a 12 mm high-density spacer was obtained. This allowed a couple of degrees of hyperextension to full flexion with a balanced flexion/extension gap and normal contact points in the polyethylene in both flexion and extension. Range of motion was 0- 120 degrees with good stability throughout. The patella was resurfaced with a 36-mm round patella. The patellofemoral tracking was normal. No lateral impingement was present. Having completed the trial reduction, tourniquet was raised after exsanguination and elevation of the cuff to 300 mm hg.   All trial components were removed. The distal femur and proximal tibia were pressfit into place . With routine technique the patellar component, was pressfit in place. Final reduction was the same as the trial reduction with full range of motion, normal stability, normal extensor mechanism function and a normal patellar femoral tracking. Having completed the procedure, the knee was thoroughly cleaned with pulse lavage and betadine wash. A drain was placed in the lateral gutter. Hemostasis was obtained with cautery. The capsule and extensor mechanism were closed with #2 Quill with use of #1 quill for additional soft tissue closure. The subcutaneous tissue was closed with 2-0  quill,   skin was closed with durabond and perinea mesh. A sterile compression dressing was applied. .    Instrument, sponge, and needle counts were correct prior to wound closure and at the conclusion of the case. Perioperative antibiotics and transexemic acid were utilized for this procedure. Orthomix and Vancomycin were used in the wound prior to closure.      Findings:  Severe medial disease with contracture    Estimated Blood Loss:  400           Drains: 0           Total IV Fluids: 1000ml           [unfilled]            Implants: * No implants in log *    Complications:  none Condition: stable    Attending Attestation: I was present and scrubbed for the entire procedure.     Signed by: Theresa Iniguez      Electronically signed by Theresa Iniguez MD on 9/14/2020 at 6:53 PM

## 2020-09-14 NOTE — H&P
[address]    [phone]    [facility] Same Day Surgery Update H & P  Department of General Surgery   Surgical Service   Pre-operative History and Physical  Last H & P within the last 30 days. DIAGNOSIS:   Primary osteoarthritis of left knee [M17.12]    Procedure(s):  LEFT TOTAL KNEE ARTHROPLASTY     HISTORY OF PRESENT ILLNESS:   Patient has chronic left knee pain. The symptoms have been recalcitrant to conservative treatment and the patient presents today for the above procedure. Covid 19:  Patient denies fever, chills, cough or known exposure to Covid-19. Patient reports they have been quarantined at home since Covid-19 test.      Past Medical History:        Diagnosis Date    Arthritis     GERD (gastroesophageal reflux disease)     High blood pressure     Hyperglycemia 6/16/2020     Past Surgical History:        Procedure Laterality Date    CARPAL TUNNEL RELEASE Bilateral 2014    KNEE SURGERY Right 1995    Scope    SHOULDER SURGERY Right     9 years age   [facility]      TOTAL KNEE ARTHROPLASTY Right 6/15/2020    RIGHT KNEE ARTHROPLASTY LEATHA, LEFT KNEE CORTICOSTEROID INJECTION performed by Yumiko Johnson MD at [address] EXTRACTION       Past Social History:  Social History     Socioeconomic History    Marital status:      Spouse name: None    Number of children: None    Years of education: None    Highest education level: None   Occupational History    None   Social Needs    Financial resource strain: None    Food insecurity     Worry: None     Inability: None    Transportation needs     Medical: None     Non-medical: None   Tobacco Use    Smoking status: Never Smoker    Smokeless tobacco: Never Used   Substance and Sexual Activity    Alcohol use:  Yes     Alcohol/week: 1.0 standard drinks     Types: 1 Cans of beer per week    Drug use: Never    Sexual activity: None   Lifestyle    Physical activity     Days per week: None     Minutes per session: None    Stress: None   Relationships    Social connections     Talks on phone: None     Gets together: None     Attends [employer]ism service: None     Active member of club or organization: None     Attends meetings of clubs or organizations: None     Relationship status: None    Intimate partner violence     Fear of current or ex partner: None     Emotionally abused: None     Physically abused: None     Forced sexual activity: None   Other Topics Concern    None   Social History Narrative    None         Medications Prior to Admission:      Prior to Admission medications    Medication Sig Start Date End Date Taking?  Authorizing Provider   traMADol (ULTRAM) 50 MG tablet TAKE ONE TABLET EVERY 4 HOURS BY MOUTH AS NEEDED FOR PAIN 9/8/20 9/15/20 Yes ISHMAEL Zamora   tiZANidine (ZANAFLEX) 4 MG tablet TAKE 1 TABLET BY MOUTH THREE TIMES DAILY 8/17/20  Yes ISHMAEL Francis   Prenatal Vit-Fe Fumarate-FA (PRENATAL VITAMIN PLUS LOW IRON) 27-1 MG TABS Take 1 tablet by mouth 2 times daily 8/11/20 9/10/20 Yes ISHMAEL Francis   cetirizine (ZYRTEC) 10 MG tablet Take 10 mg by mouth daily   Yes Historical Provider, MD   amLODIPine (NORVASC) 2.5 MG tablet Take 2.5 mg by mouth daily 10/16/19  Yes Historical Provider, MD   hydrochlorothiazide (HYDRODIURIL) 25 MG tablet 25 mg 12/6/18  Yes Historical Provider, MD   losartan (COZAAR) 100 MG tablet 100 mg daily 11/5/18  Yes Historical Provider, MD   omeprazole (PRILOSEC) 40 MG delayed release capsule 40 mg daily 12/6/18  Yes Historical Provider, MD         Allergies:  Lisinopril    PHYSICAL EXAM:      BP (!) 160/98   Pulse 95   Temp 98.5 °F (36.9 °C) (Oral)   Resp 18   Ht 5' 11\" (1.803 m)   Wt 280 lb (127 kg)   SpO2 98%   BMI 39.05 kg/m²      Airway:  Airway patent with no audible stridor    Heart:  Regular rate and rhythm, No murmur noted    Lungs:  No increased work of breathing, good air exchange, clear to auscultation bilaterally, no crackles or wheezing    Abdomen:  Soft, non-distended, non-tender, normal active bowel sounds, no masses palpated    ASSESSMENT AND PLAN    Patient is a 53 y.o. male with above specified procedure planned. 1.  Patient seen and focused exam done today- no new changes since last physical exam on 8-    2. Access to ancillary services are available per request of the provider.     Stevan Bull     9/14/2020

## 2020-09-15 ENCOUNTER — TELEPHONE (OUTPATIENT)
Dept: ORTHOPEDIC SURGERY | Age: 55
End: 2020-09-15

## 2020-09-15 VITALS
HEIGHT: 71 IN | WEIGHT: 280 LBS | OXYGEN SATURATION: 94 % | HEART RATE: 97 BPM | TEMPERATURE: 98.5 F | RESPIRATION RATE: 18 BRPM | DIASTOLIC BLOOD PRESSURE: 76 MMHG | BODY MASS INDEX: 39.2 KG/M2 | SYSTOLIC BLOOD PRESSURE: 127 MMHG

## 2020-09-15 PROBLEM — R33.9 URINARY RETENTION: Status: ACTIVE | Noted: 2020-09-15

## 2020-09-15 LAB
ANION GAP SERPL CALCULATED.3IONS-SCNC: 10 MMOL/L (ref 3–16)
BUN BLDV-MCNC: 28 MG/DL (ref 7–20)
CALCIUM SERPL-MCNC: 8.4 MG/DL (ref 8.3–10.6)
CHLORIDE BLD-SCNC: 98 MMOL/L (ref 99–110)
CO2: 26 MMOL/L (ref 21–32)
CREAT SERPL-MCNC: 1.3 MG/DL (ref 0.9–1.3)
GFR AFRICAN AMERICAN: >60
GFR NON-AFRICAN AMERICAN: 57
GLUCOSE BLD-MCNC: 160 MG/DL (ref 70–99)
HCT VFR BLD CALC: 36 % (ref 40.5–52.5)
HEMOGLOBIN: 12.3 G/DL (ref 13.5–17.5)
MCH RBC QN AUTO: 28.6 PG (ref 26–34)
MCHC RBC AUTO-ENTMCNC: 34.1 G/DL (ref 31–36)
MCV RBC AUTO: 83.7 FL (ref 80–100)
PDW BLD-RTO: 13.1 % (ref 12.4–15.4)
PLATELET # BLD: 285 K/UL (ref 135–450)
PMV BLD AUTO: 6.9 FL (ref 5–10.5)
POTASSIUM SERPL-SCNC: 4.5 MMOL/L (ref 3.5–5.1)
RBC # BLD: 4.3 M/UL (ref 4.2–5.9)
SODIUM BLD-SCNC: 134 MMOL/L (ref 136–145)
WBC # BLD: 14.2 K/UL (ref 4–11)

## 2020-09-15 PROCEDURE — 6370000000 HC RX 637 (ALT 250 FOR IP): Performed by: PHYSICIAN ASSISTANT

## 2020-09-15 PROCEDURE — 97116 GAIT TRAINING THERAPY: CPT

## 2020-09-15 PROCEDURE — G0378 HOSPITAL OBSERVATION PER HR: HCPCS

## 2020-09-15 PROCEDURE — 2580000003 HC RX 258: Performed by: PHYSICIAN ASSISTANT

## 2020-09-15 PROCEDURE — 36415 COLL VENOUS BLD VENIPUNCTURE: CPT

## 2020-09-15 PROCEDURE — 85027 COMPLETE CBC AUTOMATED: CPT

## 2020-09-15 PROCEDURE — 97535 SELF CARE MNGMENT TRAINING: CPT

## 2020-09-15 PROCEDURE — 99253 IP/OBS CNSLTJ NEW/EST LOW 45: CPT | Performed by: INTERNAL MEDICINE

## 2020-09-15 PROCEDURE — 97110 THERAPEUTIC EXERCISES: CPT

## 2020-09-15 PROCEDURE — 97165 OT EVAL LOW COMPLEX 30 MIN: CPT

## 2020-09-15 PROCEDURE — 6360000002 HC RX W HCPCS: Performed by: PHYSICIAN ASSISTANT

## 2020-09-15 PROCEDURE — 97162 PT EVAL MOD COMPLEX 30 MIN: CPT

## 2020-09-15 PROCEDURE — 80048 BASIC METABOLIC PNL TOTAL CA: CPT

## 2020-09-15 PROCEDURE — 51798 US URINE CAPACITY MEASURE: CPT

## 2020-09-15 PROCEDURE — 99024 POSTOP FOLLOW-UP VISIT: CPT | Performed by: PHYSICIAN ASSISTANT

## 2020-09-15 PROCEDURE — 51701 INSERT BLADDER CATHETER: CPT

## 2020-09-15 RX ORDER — TAMSULOSIN HYDROCHLORIDE 0.4 MG/1
0.4 CAPSULE ORAL DAILY
Qty: 30 CAPSULE | Refills: 3 | Status: SHIPPED | OUTPATIENT
Start: 2020-09-16

## 2020-09-15 RX ORDER — OXYCODONE HYDROCHLORIDE 5 MG/1
5 TABLET ORAL EVERY 4 HOURS PRN
Qty: 28 TABLET | Refills: 0 | Status: SHIPPED | OUTPATIENT
Start: 2020-09-15 | End: 2020-09-22

## 2020-09-15 RX ORDER — TAMSULOSIN HYDROCHLORIDE 0.4 MG/1
0.4 CAPSULE ORAL DAILY
Status: DISCONTINUED | OUTPATIENT
Start: 2020-09-15 | End: 2020-09-15 | Stop reason: HOSPADM

## 2020-09-15 RX ORDER — CEPHALEXIN 500 MG/1
500 CAPSULE ORAL 2 TIMES DAILY
Qty: 20 CAPSULE | Refills: 1 | Status: SHIPPED | OUTPATIENT
Start: 2020-09-15

## 2020-09-15 RX ORDER — SENNA AND DOCUSATE SODIUM 50; 8.6 MG/1; MG/1
1 TABLET, FILM COATED ORAL 2 TIMES DAILY
Qty: 60 TABLET | Refills: 1 | Status: SHIPPED | OUTPATIENT
Start: 2020-09-15 | End: 2020-10-06 | Stop reason: ALTCHOICE

## 2020-09-15 RX ORDER — PREDNISONE 10 MG/1
10 TABLET ORAL DAILY
Qty: 10 TABLET | Refills: 0 | Status: SHIPPED | OUTPATIENT
Start: 2020-09-16 | End: 2020-09-26

## 2020-09-15 RX ADMIN — TAMSULOSIN HYDROCHLORIDE 0.4 MG: 0.4 CAPSULE ORAL at 13:20

## 2020-09-15 RX ADMIN — VITAMIN A, VITAMIN C, VITAMIN D-3, VITAMIN E, VITAMIN B-1, VITAMIN B-2, NIACIN, VITAMIN B-6, CALCIUM, IRON, ZINC, COPPER 1 TABLET: 4000; 120; 400; 22; 1.84; 3; 20; 10; 1; 12; 200; 27; 25; 2 TABLET ORAL at 09:07

## 2020-09-15 RX ADMIN — OXYCODONE 5 MG: 5 TABLET ORAL at 14:20

## 2020-09-15 RX ADMIN — DOCUSATE SODIUM 50 MG AND SENNOSIDES 8.6 MG 1 TABLET: 8.6; 5 TABLET, FILM COATED ORAL at 09:08

## 2020-09-15 RX ADMIN — OXYCODONE 5 MG: 5 TABLET ORAL at 04:44

## 2020-09-15 RX ADMIN — ASPIRIN 325 MG: 325 TABLET, COATED ORAL at 09:08

## 2020-09-15 RX ADMIN — ACETAMINOPHEN 650 MG: 325 TABLET ORAL at 04:44

## 2020-09-15 RX ADMIN — PANTOPRAZOLE SODIUM 40 MG: 40 TABLET, DELAYED RELEASE ORAL at 08:23

## 2020-09-15 RX ADMIN — ACETAMINOPHEN 650 MG: 325 TABLET ORAL at 15:08

## 2020-09-15 RX ADMIN — CEFAZOLIN 3 G: 10 INJECTION, POWDER, FOR SOLUTION INTRAVENOUS at 00:31

## 2020-09-15 RX ADMIN — HYDROCHLOROTHIAZIDE 25 MG: 25 TABLET ORAL at 09:08

## 2020-09-15 RX ADMIN — LOSARTAN POTASSIUM 100 MG: 100 TABLET ORAL at 09:11

## 2020-09-15 RX ADMIN — AMLODIPINE BESYLATE 2.5 MG: 2.5 TABLET ORAL at 09:08

## 2020-09-15 RX ADMIN — ACETAMINOPHEN 650 MG: 325 TABLET ORAL at 09:08

## 2020-09-15 RX ADMIN — CETIRIZINE HYDROCHLORIDE 10 MG: 10 TABLET, FILM COATED ORAL at 09:08

## 2020-09-15 RX ADMIN — CEFAZOLIN 3 G: 10 INJECTION, POWDER, FOR SOLUTION INTRAVENOUS at 08:23

## 2020-09-15 RX ADMIN — OXYCODONE 10 MG: 5 TABLET ORAL at 00:31

## 2020-09-15 RX ADMIN — PREDNISONE 10 MG: 10 TABLET ORAL at 09:08

## 2020-09-15 ASSESSMENT — PAIN DESCRIPTION - FREQUENCY
FREQUENCY: INTERMITTENT
FREQUENCY: INTERMITTENT
FREQUENCY: CONTINUOUS

## 2020-09-15 ASSESSMENT — PAIN DESCRIPTION - ORIENTATION
ORIENTATION: LEFT

## 2020-09-15 ASSESSMENT — PAIN DESCRIPTION - PAIN TYPE
TYPE: SURGICAL PAIN

## 2020-09-15 ASSESSMENT — PAIN SCALES - GENERAL
PAINLEVEL_OUTOF10: 4
PAINLEVEL_OUTOF10: 7
PAINLEVEL_OUTOF10: 6
PAINLEVEL_OUTOF10: 1
PAINLEVEL_OUTOF10: 4
PAINLEVEL_OUTOF10: 3
PAINLEVEL_OUTOF10: 5
PAINLEVEL_OUTOF10: 4

## 2020-09-15 ASSESSMENT — PAIN DESCRIPTION - LOCATION
LOCATION: KNEE

## 2020-09-15 ASSESSMENT — PAIN DESCRIPTION - ONSET
ONSET: ON-GOING

## 2020-09-15 ASSESSMENT — PAIN DESCRIPTION - DESCRIPTORS
DESCRIPTORS: ACHING;DISCOMFORT

## 2020-09-15 ASSESSMENT — PAIN DESCRIPTION - PROGRESSION: CLINICAL_PROGRESSION: NOT CHANGED

## 2020-09-15 NOTE — DISCHARGE SUMMARY
Department of Orthopedic Surgery  Physician Assistant   Discharge Summary    The Kimberly Marino is a 53 y.o. male admitted for left knee osteoarthritis. Kimberly Marino was admitted to the floor following His recovery in the PACU.      Discharge Diagnosis  left total knee arthroplasty  Acute urinary retention  Postoperative acute blood loss anemia    Current Inpatient Medications    Current Facility-Administered Medications: tamsulosin (FLOMAX) capsule 0.4 mg, 0.4 mg, Oral, Daily  amLODIPine (NORVASC) tablet 2.5 mg, 2.5 mg, Oral, Daily  cetirizine (ZYRTEC) tablet 10 mg, 10 mg, Oral, Daily  hydroCHLOROthiazide (HYDRODIURIL) tablet 25 mg, 25 mg, Oral, Daily  losartan (COZAAR) tablet 100 mg, 100 mg, Oral, Daily  pantoprazole (PROTONIX) tablet 40 mg, 40 mg, Oral, QAM AC  prenatal vitamin 27-1 MG tablet 1 tablet, 1 tablet, Oral, BID  tiZANidine (ZANAFLEX) tablet 4 mg, 4 mg, Oral, Q8H PRN  predniSONE (DELTASONE) tablet 10 mg, 10 mg, Oral, Daily  zolpidem (AMBIEN) tablet 5 mg, 5 mg, Oral, Nightly PRN  sodium chloride flush 0.9 % injection 10 mL, 10 mL, Intravenous, 2 times per day  sodium chloride flush 0.9 % injection 10 mL, 10 mL, Intravenous, PRN  acetaminophen (TYLENOL) tablet 650 mg, 650 mg, Oral, Q6H  sennosides-docusate sodium (SENOKOT-S) 8.6-50 MG tablet 1 tablet, 1 tablet, Oral, BID  magnesium hydroxide (MILK OF MAGNESIA) 400 MG/5ML suspension 30 mL, 30 mL, Oral, Daily PRN  0.45 % sodium chloride infusion, , Intravenous, Continuous  oxyCODONE (ROXICODONE) immediate release tablet 5 mg, 5 mg, Oral, Q4H PRN **OR** oxyCODONE (ROXICODONE) immediate release tablet 10 mg, 10 mg, Oral, Q4H PRN  HYDROmorphone (DILAUDID) injection 0.25 mg, 0.25 mg, Intravenous, Q3H PRN **OR** HYDROmorphone (DILAUDID) injection 0.5 mg, 0.5 mg, Intravenous, Q3H PRN  promethazine (PHENERGAN) tablet 12.5 mg, 12.5 mg, Oral, Q6H PRN **OR** ondansetron (ZOFRAN) injection 4 mg, 4 mg, Intravenous, Q6H PRN  aspirin EC tablet 325 mg, 325 mg, Oral, BID    Post-operatively the patients diet was advanced as tolerated and their dressing was changed on POD #1. The incision is dressing in place, clean, dry and intact with no signs of infection. The patient remained neurovascularly intact in the left lower and had intact pulses distally. Patients calf remained soft and showed no evidence of DVT. The patient was able to move their left lower extremity without any problems post-operatively. Physical therapy and occupational therapy were consulted and began working with the patient post-operatively. The patient progressed with PT/OT as would be expected and continued to improve through their stay. The patients pain was initially controlled with IV medications but we were able to transition to oral pain medications soon after arrival to the floor and their pain remained under good control through their hospital stay. From a medical standpoint the patient remained stable and continued to have the medicine team follow throughout their stay. The patient will be discharged at this time to Home  with their current diet restrictions and will continue to follow the precautions outlined to them by us and PT/OT. Condition on Discharge: Stable    Plan  Return visit in 10 days. .  Patient was instructed on the use of pain medications, the signs and symptoms of infection, and was given our number to call should they have any questions or concerns following discharge. Patient will remain in the hospital until able to void without use of catheterization  We will start utilization of Flomax daily to help with urinary retention likely due to BPH and surgical aggravation of BPH.   We will continue to monitor acute blood loss anemia with lab monitoring and prenatal vitamin supplementation    Electronically signed by ISHMAEL Campbell on 9/15/2020 at 12:17 PM

## 2020-09-15 NOTE — PROGRESS NOTES
Occupational Therapy   Occupational Therapy Initial Assessment  Date: 9/15/2020   Patient Name: Zhang Salazar  MRN: [id number]     : 1965    Date of Service: 9/15/2020    Discharge Recommendations:     OT Equipment Recommendations  Equipment Needed: No     Zhang Salazar scored a 21/24 on the AM-PAC ADL Inpatient form. Current research shows that an AM-PAC score of 18 or greater is typically associated with a discharge to the patient's home setting. Based on the patient's AM-PAC score, and their current ADL deficits, it is recommended that the patient have 2-3 sessions per week of Occupational Therapy at d/c to increase the patient's independence. Please see assessment section for further patient specific details. If patient discharges prior to next session this note will serve as a discharge summary. Please see below for the latest assessment towards goals. Assessment   Performance deficits / Impairments: Decreased functional mobility ; Decreased ADL status; Decreased balance;Decreased high-level IADLs  Assessment: Pt tolerated OT evaluation well. At baseline pt independent with all I/ADLs, transfers transfers and mobility. Pt currently performing ADLs, functional transfers and mobility with RW with supervision to Mod I. Pt able to recall education/training from previous R TKA. Recommend continued OT services during hospitalization to increase safety and independence with ADL and functional transfers. Anticipate pt will be safe to return home with family assist as needed upon discharge. Prognosis: Good  Decision Making: Low Complexity      OT Education: OT Role;Plan of Care;IADL Safety;Transfer Training;ADL Adaptive Strategies;Precautions  Patient Education: home safety concerns, OT discharge recommendations, hand placement for transfers, standing balance, management of KI, compensatory LB dressing. Barriers to Learning: pt demonstrates and verbalizes understanding.   REQUIRES OT FOLLOW UP: Yes      Activity Tolerance  Activity Tolerance: Patient Tolerated treatment well  Safety Devices  Safety Devices in place: Yes  Type of devices: Call light within reach;Nurse notified; Bed alarm in place; Left in bed           Patient Diagnosis(es): There were no encounter diagnoses. has a past medical history of Arthritis, GERD (gastroesophageal reflux disease), High blood pressure, and Hyperglycemia. has a past surgical history that includes knee surgery (Right, 1995); shoulder surgery (Right); Carpal tunnel release (Bilateral, 2014); [place] tooth extraction; sinus surgery; Total knee arthroplasty (Right, 6/15/2020); and Total knee arthroplasty (Left, 9/14/2020). Restrictions  Restrictions/Precautions  Required Braces or Orthoses?: Yes  Position Activity Restriction  Other position/activity restrictions: KI LLE, LLE WBAT    Subjective   General  Chart Reviewed: Yes  Patient assessed for rehabilitation services?: Yes  Family / Caregiver Present: No      Diagnosis: Pt admitted with OA L knee s/p L TKA on 9/14 by Dr. Kathy Quijano. Pt with acute urinary retention post op. Subjective  Subjective: Pt supine in bed upon OT arrival. Pt reports that he had his other knee replaced and recalls most of the education and precautions from that experience. General Comment  Comments: RN agreeable to OT evaluation. Patient Currently in Pain: Yes  Pain Assessment  Pain Assessment: 0-10  Pain Level: 4  Patient's Stated Pain Goal: 2  Pain Type: Surgical pain  Pain Location: Knee  Pain Orientation: Left  Pain Descriptors: Aching;Discomfort  Pain Frequency: Intermittent  Pain Onset: On-going  Non-Pharmaceutical Pain Intervention(s): Ambulation/Increased Activity; Distraction;Repositioned  Pre Treatment Pain Screening  Intervention List: Patient able to continue with treatment;Nurse/Physician notified  Vital Signs  Patient Currently in Pain: Yes       Social/Functional History  Social/Functional History  Lives With: Spouse  Type of Home: House  Home Layout: Two level, Able to Live on Main level with bedroom/bathroom  Home Access: Stairs to enter with rails  Entrance Stairs - Number of Steps: 6-7  Entrance Stairs - Rails: Right  Bathroom Shower/Tub: Tub/Shower unit  Home Equipment: Rolling walker, [employer] [place] [employer] From: (no one)  ADL Assistance: Independent  Homemaking Assistance: Independent  Ambulation Assistance: Independent  Transfer Assistance: Independent  Active : Yes  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  Leisure & Hobbies: Hiking         Objective   Vision: Impaired  Vision Exceptions: Wears glasses at all times  Hearing: Within functional limits      Orientation  Overall Orientation Status: Within Normal Limits      Balance  Sitting Balance: Independent  Standing Balance: Supervision      Functional Mobility  Functional - Mobility Device: Rolling Walker  Activity: To/from bathroom  Assist Level: Supervision      Toilet Transfers  Toilet - Technique: Ambulating  Equipment Used: Grab bars  Toilet Transfer: Supervision  Toilet Transfers Comments: to/from toilet using grab bar and RW for approach. Cues for hand placement. ADL  Grooming: Modified independent (standing at sink to wash hands)  LE Dressing: Supervision(doff/don socks, shorts, knee immobilizer. 1 VC for knee immobilizer. Intermittent UE support on RW when in stance)  Toileting: Supervision(intermittent UE support on RW when in stance)       Tone RUE  RUE Tone: Normotonic  Tone LUE  LUE Tone: Normotonic  Coordination  Movements Are Fluid And Coordinated: Yes         Bed mobility  Supine to Sit: Modified independent  Sit to Supine: Modified independent       Transfers  Stand Step Transfers: Supervision  Sit to stand: Supervision  Stand to sit: Supervision  Transfer Comments: to/from EOB using RW with 1 VC for hand placement.        Vision - Basic Assessment  Prior Vision: Wears glasses all the time  Patient Visual Report: No visual complaint reported. Visual Field Cut: No  Oculo Motor Control: WNL      Cognition  Overall Cognitive Status: WFL           Sensation  Overall Sensation Status: WNL        LUE AROM (degrees)  LUE AROM : WNL  RUE AROM (degrees)  RUE AROM : WNL  LUE Strength  Gross LUE Strength: WNL  L Hand General: 5/5  LUE Strength Comment: grossly 5/5  RUE Strength  Gross RUE Strength: WNL  R Hand General: 5/5  RUE Strength Comment: grossly 5/5                   Plan   Plan  Times per week: 5-7x/week  Current Treatment Recommendations: Strengthening, Patient/Caregiver Education & Training, Gait Training, Home Management Training, Stair training, Equipment Evaluation, Education, & procurement, Balance Training, Positioning, Functional Mobility Training, Endurance Training, Pain Management, Safety Education & Training, Self-Care / ADL    AM-PAC Score        AM-[facility] Inpatient Daily Activity Raw Score: 21 (09/15/20 1502)  AM-PAC Inpatient ADL T-Scale Score : 44.27 (09/15/20 1502)  ADL Inpatient CMS 0-100% Score: 32.79 (09/15/20 1502)  ADL Inpatient CMS G-Code Modifier : Evelyn Crutch (09/15/20 1502)    Goals  Short term goals  Time Frame for Short term goals: upon discharge  Short term goal 1: Mod I LB dressing  Short term goal 2: Mod I toileting  Short term goal 3: Mod I item retrieval/transport for ADLs with use of RW  Short term goal 4: Mod I functional transfers to/from EOB, chair, toilet, WIS  Patient Goals   Patient goals : to get up the stairs at home to access the shower       Therapy Time   Individual Concurrent Group Co-treatment   Time In 1428         Time Out 1451         Minutes 23         Timed Code Treatment Minutes: 9 Minutes(15 minute evaluatio)     If pt is discharged prior to next OT session, this note will serve as the discharge summary.     Toya Betancourt, OT

## 2020-09-15 NOTE — PLAN OF CARE
Problem: Falls - Risk of:  Goal: Will remain free from falls  Description: Will remain free from falls  Outcome: Met This Shift   Pt free from injury this shift and free of falls. 2/4 rails up on bed and bed is in the lowest position. Wheels locked and bed alarm set. Socks on pt and ID bands on pt. Call light in reach of pt and pt educated to call out to get up x1 walker, GB. Will continue to monitor for safety. Problem: Infection - Surgical Site:  Goal: Will show no infection signs and symptoms  Description: Will show no infection signs and symptoms  Outcome: Met This Shift   Afebrile, abx per orders. No unusual redness, swelling or drainage noted.

## 2020-09-15 NOTE — PROGRESS NOTES
Admitted to 5 tower. NV intact. dressing CDI. Yet to void. Void check 0300. Pain controlled with PO. tolerates clears thus far. No n/v. Bed alarm set. Call light in reach. Will continue to monitor. 0300: pt yet to void but wants to continue trying for another hour. Will return within the hour to assess pt ability to void.  Will straight cath per orders if pt unsuccessful.    0521; pt straight cath'd for 800 ml

## 2020-09-15 NOTE — PROGRESS NOTES
Pt discharged with family in personal vehicle with all belongings. Pt education provided by RN. Pt verbalized understanding. Rx sent to outpatient pharmacy by MD- verified by RN via secure messaging.

## 2020-09-15 NOTE — PROGRESS NOTES
Physical Therapy    Facility/Department: [facility] 5T ORTHO/NEURO  Initial Assessment & Treatment     NAME: Gabriela Solis  : 1965  MRN: [id number]    Date of Service: 9/15/2020    Discharge Recommendations:  Gabriela Solis scored a 20/24 on the AM-PAC short mobility form. Current research shows that an AM-PAC score of 18 or greater is typically associated with a discharge to the patient's home setting. Based on the patient's AM-PAC score and their current functional mobility deficits, it is recommended that the patient have 2-3 sessions per week of Physical Therapy at d/c to increase the patient's independence. At this time, this patient demonstrates the endurance and safety to discharge home with assist from family (home vs OP services) and a follow up treatment frequency of 2-3x/wk. Please see assessment section for further patient specific details. If patient discharges prior to next session this note will serve as a discharge summary. Please see below for the latest assessment towards goals. PT Equipment Recommendations  Equipment Needed: No(Pt has appropriate DME)    Assessment   Body structures, Functions, Activity limitations: Decreased functional mobility ; Decreased posture;Decreased ROM; Decreased strength  Assessment: Court Peralta presents s/p TKA of the LLE & tolerated PT evaluation with no adverse events. Pt mobilizing on level & unlevel surfaces with CGA<>close supervision wtih no noted LOB and/or knee buckling noted. Pt demo's excepted decrease in AROM of the LLE following TKA  however receptive to HEP & demo'd understanding. Pt planning for a DC home with support from family & will benefit from OPPT services upon medical clearance. Treatment Diagnosis: Dec'd ROM s/p TKA  Prognosis: Good  Decision Making: Medium Complexity  PT Education: General Safety;Gait Training;PT Role;Equipment; Functional Mobility Training;Transfer Training;Precautions  Patient Education: Pt v.u  Barriers to Learning: n/a  REQUIRES PT FOLLOW UP: Yes  Activity Tolerance  Activity Tolerance: Patient Tolerated treatment well       Patient Diagnosis(es): There were no encounter diagnoses. has a past medical history of Arthritis, GERD (gastroesophageal reflux disease), High blood pressure, and Hyperglycemia. has a past surgical history that includes knee surgery (Right, 1995); shoulder surgery (Right); Carpal tunnel release (Bilateral, 2014); [place] tooth extraction; sinus surgery; Total knee arthroplasty (Right, 6/15/2020); and Total knee arthroplasty (Left, 9/14/2020). Restrictions  Position Activity Restriction  Other position/activity restrictions: KI LLE  Vision/Hearing  Vision: Impaired  Vision Exceptions: Wears glasses at all times  Hearing: Within functional limits     Subjective  General  Chart Reviewed: Yes  Patient assessed for rehabilitation services?: Yes  Additional Pertinent Hx: 54 y/o M presents s/p L TKA.   Response To Previous Treatment: Not applicable  Family / Caregiver Present: No  Referring Practitioner: Marichuy Nunez  Referral Date : 09/14/20  Diagnosis: L TKA  Follows Commands: Within Functional Limits  General Comment  Comments: Pt resting in bed upon PT arrival  Subjective  Subjective: Pt agreeable to PT evaluation  Pain Screening  Patient Currently in Pain: Denies  Vital Signs  Patient Currently in Pain: Denies       Orientation  Orientation  Overall Orientation Status: Within Normal Limits  Social/Functional History  Social/Functional History  Lives With: Spouse  Type of Home: House  Home Layout: Two level, Able to Live on Main level with bedroom/bathroom  Home Access: Stairs to enter with rails  Entrance Stairs - Number of Steps: 6-7  Entrance Stairs - Rails: Right  Bathroom Shower/Tub: Tub/Shower unit  Home Equipment: Rolling walker, [place] [employer] From: (no one)  ADL Assistance: Independent  Homemaking Assistance: Independent  Ambulation Assistance: Independent  Transfer Assistance: Independent  Active : Yes  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 Achieving ~90* knee flexion while seated in bedside chair & lacking 10-12* from neutral position while semi supine in bed.)  Strength RLE  Strength RLE: WFL  Strength LLE  Strength LLE: WFL  Tone RLE  RLE Tone: Normotonic  Tone LLE  LLE Tone: Normotonic  Motor Control  Gross Motor?: WNL  Coordination  Rapid Alternating Movements: Normal  Sensation  Overall Sensation Status: WNL(Pt denies any N/T)  Bed mobility  Supine to Sit: Modified independent(with HOB elevated.)  Transfers  Sit to Stand: Contact guard assistance(up to RW with VCs for hand placement.)  Stand to sit: Supervision(with VCs for positioning of the LLE & hand placement.)  Comment: KI donned on LLE prior to mobility; education provided on recommended wear time of brace with OOB mobility only, pt v.u  Ambulation  Ambulation?: Yes  WB Status: n/a  More Ambulation?: No  Ambulation 1  Surface: level tile  Device: Rolling Walker  Assistance: Contact guard assistance;Supervision  Quality of Gait: inc'd trunk flexion, dec'd LLE stance time & step length, dec'd LLE heel strike with a slow antalgic gait pattern. Mild trunk sway noted however no overt LOB. Distance: 72' x 2 repetitions  Comments: VCs for inc'd heel strike of the LLE at initial contact as well as for gradual progression to a equal B step length/stride length. Stairs/Curb  Stairs?: Yes  Stairs  # Steps : 5  Stairs Height: 6\"  Rails: Bilateral(Pt ascends/descends stairs with a step to pattern leading with the RLE when ascending & the LLE when descending. VCs for sequencing patter.  No LOB noted.)  Assistance: Contact guard assistance     Balance  Posture: Fair  Sitting - Static: (I)  Sitting - Dynamic: (I)  Standing - Static: (S with RW)  Standing - Dynamic: (S<>CGA with RW)  Exercises  Quad Sets: x 5 repetitions with a 5 second hold LLE  Knee Active Range of Motion: x 10 repetitions with a 5 second hold L knee flexion while sitting in bedside chair. Comments: VCs for improved therex technique. Other exercises  Other exercises?: No     ADDENDUM: 2nd Session   PT returned in PM for additional tx. Pt resting in bed & agreeable to session however endorsing inc'd pain 2/2 recently mobilizing with OT. Pt participated in LE/ROM strengthening therapeutic exercises with no adverse events & required min cues for improved therex technique & +time 2/2 pain. Pt completed the following exercises: x10 repetitions B ankle DF/PF, x 10 repetitions LLE QS with a 5 second hold, x 10 repetitions B GS with a 5 second hold, 2 sets x 5 repetitions AA LLE hip abd/adduction, 2 sets x 5 repetitions AA LLE heel slides with a 5 second hold at available end range (30-40*). At conclusion of tx pt resting in bed with all lines intact, call light In reach, bed alarm on & spouse at bedside. Plan   Plan  Times per week: 7  Times per day: Twice a day  Current Treatment Recommendations: Strengthening, Home Exercise Program, ROM, Safety Education & Training, Balance Training, Endurance Training, Patient/Caregiver Education & Training, Functional Mobility Training, Equipment Evaluation, Education, & procurement, Transfer Training, Gait Training, Stair training  Safety Devices  Type of devices:  All fall risk precautions in place, Left in chair, Call light within reach, Chair alarm in place  Restraints  Initially in place: No    G-Code       OutComes Score                                                  AM-PAC Score  AM-PAC Inpatient Mobility Raw Score : 20 (09/15/20 0955)  AM-PAC Inpatient T-Scale Score : 47.67 (09/15/20 0955)  Mobility Inpatient CMS 0-100% Score: 35.83 (09/15/20 0955)  Mobility Inpatient CMS G-Code Modifier : Chelly Hernandez (09/15/20 7191)          Goals  Short term goals  Time Frame for Short term goals: DC  Short term goal 1: Pt will complete functional txfs independently. Short term goal 2: Pt will ambulate greater than 300' via Mod I with LRAD  Short term goal 3: Pt will ascend/descend one flight of stairs via Mod I with hand railing & a step to pattern. Short term goal 4: Pt will demo' 0-100* active L knee flexion. Patient Goals   Patient goals :  To DC home this date       Therapy Time   Individual Concurrent Group Co-treatment   Time In 0915         Time Out 0940         Minutes 25         Timed Code Treatment Minutes: 25 Minutes        Second Session Therapy Time:   Individual Concurrent Group Co-treatment   Time In 1450         Time Out 1508         Minutes 18           Timed Code Treatment Minutes:  18    Total Treatment Minutes:  88 Inocencio Hernandez, PT

## 2020-09-15 NOTE — CARE COORDINATION
Case Management Assessment           Initial Evaluation                Date / Time of Evaluation: 9/15/2020 3:27 PM                 Assessment Completed by: Yeison Rhodes    Patient Name: Fili Real     YOB: 1965  Diagnosis: Primary osteoarthritis of left knee [M17.12]  Status post total knee replacement not using cement, left [Z96.652]     Date / Time: 9/14/2020 12:57 PM    Patient Admission Status: Inpatient    If patient is discharged prior to next notation, then this note serves as note for discharge by case management. Current PCP: [facility] [address] Patient: No    Chart Reviewed: Yes  Patient/ Family Interviewed: Yes    Initial assessment completed at bedside with: pt at bedside    Hospitalization in the last 30 days: No    Emergency Contacts:  Extended Emergency Contact Information  Primary Emergency Contact: Carly Mg  Address: [address] [place] [address] [address] Phone: [phone]  Work Phone: [phone]  Mobile Phone: [phone]  Relation: Spouse    Advance Directives:   Code Status: [address]: No  Financial  Payor: Fiona Faust 150 / Plan: Fiona Faust 150 - [place] PPO / Product Type: *No Product type* /     Pre-cert required for SNF: Yes    Pharmacy    Jeff Linares [address] [place] [address] [address] [facility]  [address] [facility] [address]  Phone: [phone] Fax: [phone]      Potential assistance Purchasing Medications: Potential Assistance Purchasing Medications: No  Does Patient want to participate in local refill/ meds to beds program?: Yes    Meds To Beds General Rules:  1. Can ONLY be done Monday- Friday between 8:30am-5pm  2. Prescription(s) must be in pharmacy by 3pm to be filled same day  3. Copy of patient's insurance/ prescription drug card and patient face sheet must be sent along with the prescription(s)  4.  Cost of Rx cannot be added to hospital bill. If financial assistance is needed, please contact unit  or ;  or  CANNOT provide pharmacy voucher for patients co-pays  5. Patients can then  the prescription on their way out of the hospital at discharge, or pharmacy can deliver to the bedside if staff is available. (payment due at time of pick-up or delivery - cash, check, or card accepted)     Able to afford home medications/ co-pay costs: Yes    ADLS  Support Systems: Spouse/Significant Other, Children    PT AM-PAC: 20 /24  OT AM-PAC: 21 /24    [place]: family  Steps: na    Plans to RETURN to current housing: Yes  Barriers to RETURNING to current housing: none    Harris Mota  Currently ACTIVE with [address]: No  Home Care Agency: [facility]  Phone: [phone]  Fax: [phone]      DISCHARGE PLAN:  Disposition: Home with [address]: [facility]. for discharge: family     Factors facilitating achievement of predicted outcomes: Family support and Friend support    Barriers to discharge: n/a    Additional Case Management Notes: Pt to return home with [facility] that was set up by ortho office. The Plan for Transition of Care is related to the following treatment goals of Primary osteoarthritis of left knee [M17.12]  Status post total knee replacement not using cement, left [Z96.652]    The Patient and/or patient representative Stephenie Anderson and his family were provided with a choice of provider and agrees with the discharge plan Yes    Freedom of choice list was provided with basic dialogue that supports the patient's individualized plan of care/goals and shares the quality data associated with the providers.  Yes    Care Transition patient: No    Juan Luis Sierra RN  [facility] [employer]  Case Management Department  Ph: [phone]   Fax: [phone]

## 2020-09-15 NOTE — PROGRESS NOTES
PACU Transfer Note    Vitals:    09/14/20 2015   BP: 124/87   Pulse: 103   Resp: 16   Temp: 98.6 °F (37 °C)   SpO2: 96%       In: 1925 [I.V.:1925]  Out: 450     Pain assessment:  present - adequately treated  Pain Level: 7    Report given to Receiving unit RN.    9/14/2020 8:18 PM

## 2020-09-15 NOTE — CONSULTS
[address]               [address] [facility], [address]                                  CONSULTATION    PATIENT NAME: Maria C Adams                     :        1965  MED REC NO:   [id number]                          ROOM:       [id number]  ACCOUNT NO:   [de-identified]                           ADMIT DATE: 2020  PROVIDER:     Stephanie Anderson MD    CONSULT DATE:  09/15/2020    INTERNAL MEDICINE CONSULT NOTE    HISTORY OF PRESENT ILLNESS:  This patient is a 58-year-old white male  with a history of DJD, a history of GERD, a history of exogenous obesity  class II, a history of previous hyperglycemia and hypertension, who was  admitted for total knee replacement postoperatively. He had some  problems with urinary retention. It should be noted, he has had urinary  retention _____ previous anesthetic for his other knee replacement, and  at that time, it resolved within 24 hours. He did have to have a  straight cath last night. SOCIAL HISTORY:  Does not smoke, does not drink excessively. FAMILY HISTORY:  Otherwise noncontributory. REVIEW OF SYSTEMS:  Otherwise noncontributory. PHYSICAL EXAMINATION:  GENERAL:  Currently, he appears in no acute distress. VITAL SIGNS:  Blood pressure 120/70, pulse rate is 70, respiratory rate  12 and easy. He is afebrile. HEENT:  Head:  Atraumatic, normocephalic. Eyes:  Sclerae noninjected. NECK:  No jugular venous distention. LUNGS:  Reveals some decreased breath sounds at the base. HEART:  Reveals a normal S1, S2 without any murmurs or gallops. ABDOMEN:  Soft without tenderness. EXTREMITIES:  Reveal evidence of the left total knee replacement; acute  total knee replacement and a right, old total knee replacement. LABORATORY DATA:  Preoperatively, show a blood sugar nonfasting of 111. Electrolytes are otherwise within normal limits with BUN and creatinine  of 10 and 1.0.   Hemoglobin and hematocrit are 14.6 and 42.9, white count  7.6, platelets are normal at 205. Coags were within normal limits. DIAGNOSTIC DATA:  EKG is now well visualized, but appears grossly  normal.    IMPRESSION:  1. Status post total knee replacement. 2.  History of hyperglycemia. 3.  Urinary retention probably secondary to BPH and anesthetic. PLAN:  1. We will Accu-Chek to make sure blood sugars are okay. 2.  Continue home meds. 3.  Flomax to be initiated here. 4.  Physical therapy and incentive spirometry. This patient is able to urinate spontaneously. No medical reason to  delay discharge. He will follow up with his primary care physician in  two to four weeks. He was instructed that if he starts Flomax that he  is continue it for a period of at least two weeks. Thank you for allowing me to see this most interesting patient. We will  follow with you.         Bhumi Moralez MD    D: 09/15/2020 8:51:43       T: 09/15/2020 10:03:33     MONICA/MARY [email or web address]  Job#: [id number]     Doc#: [id number]    CC:

## 2020-09-15 NOTE — PROGRESS NOTES
4 Eyes Admission Assessment     I agree as the admission nurse that 2 RN's have performed a thorough Head to Toe Skin Assessment on the patient. ALL assessment sites listed below have been assessed on admission. Areas assessed by both nurses:   [x]   Head, Face, and Ears   [x]   Shoulders, Back, and Chest  [x]   Arms, Elbows, and Hands   [x]   Coccyx, Sacrum, and Ischium  [x]   Legs, Feet, and Heels        Does the Patient have Skin Breakdown? None noted except surgical incision and old surgical scar from other knee surgery 3 months ago.         Shaji Prevention initiated:  Yes   Wound Care Orders initiated:  NA      [facility] nurse consulted for Pressure Injury (Stage 3,4, Unstageable, DTI, NWPT, and Complex wounds) or Shaji score 18 or lower:  No      Nurse 1 eSignature: Electronically signed by Jovani Castañeda RN on 9/15/20 at 12:09 AM EDT    **SHARE this note so that the co-signing nurse is able to place an eSignature**    Nurse 2 eSignature: Electronically signed by Kristie Raphael RN on 9/15/20 at 3:17 AM EDT

## 2020-09-15 NOTE — PROGRESS NOTES
Department of Orthopedic Surgery  Physician Assistant   Progress Note    Subjective:     Post-Operative Day: 1 Status Post left Total Knee Arthroplasty  Systemic or Specific Complaints:No Complaints    Objective:     Patient Vitals for the past 24 hrs:   BP Temp Temp src Pulse Resp SpO2 Height Weight   09/15/20 1042 116/71 98 °F (36.7 °C) Oral 99 18 94 % -- --   09/15/20 0907 110/73 -- -- -- -- -- -- --   09/15/20 0707 120/71 98.7 °F (37.1 °C) Oral 103 18 96 % -- --   09/15/20 0444 128/75 97.4 °F (36.3 °C) Oral 95 18 93 % -- --   09/14/20 2324 122/78 97.9 °F (36.6 °C) Oral 97 18 93 % -- --   09/14/20 2205 -- -- -- -- 18 95 % -- --   09/14/20 2101 127/80 97.7 °F (36.5 °C) Oral 95 16 95 % -- --   09/14/20 2015 124/87 98.6 °F (37 °C) Temporal 103 16 96 % -- --   09/14/20 2000 135/88 -- -- 101 14 97 % -- --   09/14/20 1945 135/83 -- -- 96 9 93 % -- --   09/14/20 1930 (!) 143/106 -- -- 101 14 93 % -- --   09/14/20 1920 133/84 -- -- 101 13 93 % -- --   09/14/20 1915 (!) 126/91 -- -- 96 9 91 % -- --   09/14/20 1910 115/68 -- -- 93 10 (!) 89 % -- --   09/14/20 1908 109/72 99 °F (37.2 °C) Temporal 93 12 95 % -- --   09/14/20 1445 124/74 -- -- 73 12 98 % -- --   09/14/20 1440 130/76 -- -- 80 17 96 % -- --   09/14/20 1321 (!) 160/98 98.5 °F (36.9 °C) Oral 95 18 98 % 5' 11\" (1.803 m) 280 lb (127 kg)       General: alert, appears stated age and cooperative   Wound: Wound clean and dry no evidence of infection. , No Erythema, No Edema, No Drainage and Wound Intact   Motion: Painless Range of Motion   DVT Exam: No evidence of DVT seen on physical exam.  Negative Dameon's sign. No cords or calf tenderness. No significant calf/ankle edema. Knee swollen but thigh soft to palpation. Moving foot and ankle. Good distal pulses.       Data Review  CBC:   Lab Results   Component Value Date    WBC 14.2 09/15/2020    RBC 4.30 09/15/2020    HGB 12.3 09/15/2020    HCT 36.0 09/15/2020     09/15/2020       Assessment:     Status Post left Total Knee Arthroplasty. Complications: Acute urinary retention    Plan:      1: Discharge today if patient is able to void, Return to Clinic: 10 to 14 days   postoperatively:  2:  Continue Deep venous thrombosis prophylaxis  3:  Continue physical therapy  4:  Continue Pain Control  5:  We will start patient on Flomax 0.4 mg 1 daily to help with urinary  retention      Electronically signed by ISHMAEL Charles on 9/15/2020 at 11:57 AM

## 2020-09-16 ENCOUNTER — TELEPHONE (OUTPATIENT)
Dept: ORTHOPEDIC SURGERY | Age: 55
End: 2020-09-16

## 2020-09-17 ENCOUNTER — OFFICE VISIT (OUTPATIENT)
Dept: ORTHOPEDIC SURGERY | Age: 55
End: 2020-09-17

## 2020-09-17 VITALS
HEIGHT: 71 IN | DIASTOLIC BLOOD PRESSURE: 75 MMHG | WEIGHT: 280 LBS | HEART RATE: 92 BPM | BODY MASS INDEX: 39.2 KG/M2 | SYSTOLIC BLOOD PRESSURE: 118 MMHG | TEMPERATURE: 97.3 F

## 2020-09-17 PROCEDURE — 99024 POSTOP FOLLOW-UP VISIT: CPT | Performed by: ORTHOPAEDIC SURGERY

## 2020-09-17 RX ORDER — OXYCODONE HYDROCHLORIDE AND ACETAMINOPHEN 5; 325 MG/1; MG/1
1 TABLET ORAL EVERY 6 HOURS PRN
Qty: 28 TABLET | Refills: 0 | Status: SHIPPED | OUTPATIENT
Start: 2020-09-17 | End: 2020-09-22 | Stop reason: SDUPTHER

## 2020-09-17 NOTE — PROGRESS NOTES
patient is doing well 1 week(s) status post total knee replacement. The patient will finish up therapy. They may slowly resume normal activities. DVT plan asa bid now change to QD.        The orders below, if any, were placed during this visit:          ICD-10-CM    1. Status post total knee replacement not using cement, left  Z96.652 XR KNEE LEFT (3 VIEWS)         Gayla Carmen MD

## 2020-09-22 ENCOUNTER — OFFICE VISIT (OUTPATIENT)
Dept: ORTHOPEDIC SURGERY | Age: 55
End: 2020-09-22

## 2020-09-22 VITALS
WEIGHT: 280 LBS | SYSTOLIC BLOOD PRESSURE: 118 MMHG | HEART RATE: 89 BPM | TEMPERATURE: 97.3 F | BODY MASS INDEX: 39.2 KG/M2 | HEIGHT: 71 IN | DIASTOLIC BLOOD PRESSURE: 77 MMHG

## 2020-09-22 PROCEDURE — 99024 POSTOP FOLLOW-UP VISIT: CPT | Performed by: ORTHOPAEDIC SURGERY

## 2020-09-22 RX ORDER — OXYCODONE HYDROCHLORIDE AND ACETAMINOPHEN 5; 325 MG/1; MG/1
1 TABLET ORAL EVERY 6 HOURS PRN
Qty: 28 TABLET | Refills: 0 | Status: SHIPPED | OUTPATIENT
Start: 2020-09-22 | End: 2020-09-29

## 2020-09-22 NOTE — PROGRESS NOTES
Patient Name: Sienna Parmar MRN: <[id number]>   Age: 53 y.o. YOB: 1965   Sex: male         CHIEF COMPLAINT   Total Knee postoperative visit    HISTORY OF PRESENT ILLNESS   Patient returns for their second postoperative evaluation status post total knee replacement. The patient is doing very fair. Swelling and pain better with wraps. They have moderate complaints of pain. There is moderate swelling about the knee. The patient has been doing physical therapy at home. They deny any calf swelling or numbness or tingling down the leg       Assessment      Review of Systems   Constitutional: Negative for chills and fever. HENT: Negative for nosebleeds. Eyes: Negative for double vision. Cardiovascular: Negative for chest pain. Gastrointestinal: Negative for abdominal pain. Musculoskeletal: Positive for joint pain and myalgias. Skin: Negative for rash. Neurological: Negative for seizures. Psychiatric/Behavioral: Negative for hallucinations. PHYSICAL EXAM     Vital Signs:   Vitals:    09/22/20 1035   BP: 118/77   Pulse: 89   Temp: 97.3 °F (36.3 °C)       Examination of the knee shows a well-healed midline incision. There is moderate swelling. There is no drainage. Range of motion is 5to 90. The patient is neurovascularly intact. NEUROLOGICALLY: There is no evidence for sensory or motor deficits in the extremity. Coordination appears full with no spacticity or rigidity. Reflexes appear to be symmetric. Distal circulation intact. No signs of RSD. Calf nontender. Negative delma's,  no signs of dvt    Hip exam shows full ROM with no focal pain or Instability. Leg lengths are normal. There is no Trendelenburg Gait. RADIOLOGY   Xray   Have reviewed the xrays above from 09/22/20   and my impression is:    IMPRESSION   2 week(s) status post total knee replacement    PLAN   The patient is doing well 2 week(s) status post total knee replacement.     The patient will finish up therapy. They may slowly resume normal activities.       The orders below, if any, were placed during this visit:          ICD-10-CM    1. Status post total knee replacement not using cement, left  U40.522          Amy Jain MD

## 2020-09-25 ENCOUNTER — TELEPHONE (OUTPATIENT)
Dept: ORTHOPEDIC SURGERY | Age: 55
End: 2020-09-25

## 2020-09-29 RX ORDER — PREDNISONE 10 MG/1
10 TABLET ORAL DAILY
Qty: 10 TABLET | Refills: 0 | Status: SHIPPED | OUTPATIENT
Start: 2020-09-29 | End: 2020-10-09

## 2020-09-30 RX ORDER — ROPINIROLE 1 MG/1
1 TABLET, FILM COATED ORAL 3 TIMES DAILY
Qty: 90 TABLET | Refills: 3 | Status: SHIPPED | OUTPATIENT
Start: 2020-09-30 | End: 2021-01-20

## 2020-10-06 ENCOUNTER — HOSPITAL ENCOUNTER (OUTPATIENT)
Dept: PHYSICAL THERAPY | Age: 55
Setting detail: THERAPIES SERIES
Discharge: HOME | End: 2020-10-06
Payer: COMMERCIAL

## 2020-10-06 ENCOUNTER — OFFICE VISIT (OUTPATIENT)
Dept: ORTHOPEDIC SURGERY | Age: 55
End: 2020-10-06

## 2020-10-06 VITALS
TEMPERATURE: 97.5 F | BODY MASS INDEX: 39.2 KG/M2 | SYSTOLIC BLOOD PRESSURE: 131 MMHG | DIASTOLIC BLOOD PRESSURE: 91 MMHG | HEART RATE: 95 BPM | HEIGHT: 71 IN | WEIGHT: 280 LBS

## 2020-10-06 PROCEDURE — 97110 THERAPEUTIC EXERCISES: CPT

## 2020-10-06 PROCEDURE — 97112 NEUROMUSCULAR REEDUCATION: CPT

## 2020-10-06 PROCEDURE — 99024 POSTOP FOLLOW-UP VISIT: CPT | Performed by: PHYSICIAN ASSISTANT

## 2020-10-06 PROCEDURE — 97161 PT EVAL LOW COMPLEX 20 MIN: CPT

## 2020-10-06 RX ORDER — TRAMADOL HYDROCHLORIDE 50 MG/1
50 TABLET ORAL EVERY 4 HOURS PRN
Qty: 42 TABLET | Refills: 0 | Status: SHIPPED | OUTPATIENT
Start: 2020-10-06 | End: 2020-10-13

## 2020-10-06 RX ORDER — TIZANIDINE 4 MG/1
4 TABLET ORAL EVERY 8 HOURS PRN
Qty: 90 TABLET | Refills: 1 | Status: SHIPPED | OUTPATIENT
Start: 2020-10-06 | End: 2020-11-25

## 2020-10-06 RX ORDER — ETODOLAC 400 MG/1
400 TABLET, FILM COATED ORAL 2 TIMES DAILY
Qty: 60 TABLET | Refills: 3 | Status: SHIPPED | OUTPATIENT
Start: 2020-10-06 | End: 2021-01-20

## 2020-10-06 NOTE — PLAN OF CARE
[address] [facility], [facility]  [address] [facility], [facility]  Phone: [phone]   Fax:     [phone]                                                       Physical Therapy Certification    Dear Referring Practitioner: Dr. Filemon Franz,    We had the pleasure of evaluating the following patient for physical therapy services at [facility]. A summary of our findings can be found in the initial assessment below. This includes our plan of care. If you have any questions or concerns regarding these findings, please do not hesitate to contact me at the office phone number checked above. Thank you for the referral.       Physician Signature:_______________________________Date:__________________  By signing above (or electronic signature), therapists plan is approved by physician              Patient: Palak Brown   : 1965   MRN: [id number]  Referring Physician: Referring Practitioner: Dr. Filemon Franz      Evaluation Date: 10/6/2020      Medical Diagnosis Information:  Diagnosis: L TKR   Treatment Diagnosis: Decreased mobility, strength                                         Insurance information: PT Insurance Information: Robinson Goldberg ([phone] - has used 4)     Precautions/ Contra-indications: none  Latex Allergy:  [x]NO      []YES  Preferred Language for Healthcare:   [x]English       []other:    C-SSRS Triggered by Intake questionnaire (Past 2 wk assessment ):   [x] No, Questionnaire did not trigger screening.   [] Yes, Patient intake triggered C-SSRS Screening      [] C-SSRS Screening completed  [] PCP notified via Epic     SUBJECTIVE: Patient stated complaint: Patient underwent L TKR on 20. Patient was discharged to home where he received home PT for a few weeks. Patient has been using straight cane for ambulation for the past week.   This week he has been using the cane as needed. Currently doing stairs one at a time. 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    Living Status/Prior Level of Function: Independent with ADLs and IADLs, able to ambulate with cane    OBJECTIVE:     ROM LEFT RIGHT   Knee ext Active=lacking 15 degrees  Passive=lacking 13 degrees Lacking 11 degrees   Knee Flex 120 124   Strength  LEFT RIGHT   HIP Flexors 4/5 5/5   HIP Abductors 5/5 5/5   Knee EXT (quad) 4-/5 5/5   Knee Flex (HS) 4/5 5/5   Ankle DF 4+/5 5/5   Ankle PF 4-/5 5/5        Circumference  Mid apex  7 cm prox     42.3 cm  45.8 cm     42.0 cm  46.2 cm     Reflexes/Sensation:    [x]Dermatomes/Myotomes intact    [x]Reflexes equal and normal bilaterally   []Other:    Joint mobility:    [x]Normal    []Hypo   []Hyper    Palpation: mild TTP around L ankle    Functional Mobility/Transfers: independent    Bandages/Dressings/Incisions: arrived to PT with ACE wrap from foot to above knee for edema management. Incision healed with no signs of infection    Gait: (include devices/WB status) Patient ambulates with straight cane WBAT. Patient demonstrates mild antalgic gait with decreased knee flexion at swing phase    Orthopedic Special Tests: VMO tone mildly decreased                        [x] Patient history, allergies, meds reviewed. Medical chart reviewed. See intake form. Review Of Systems (ROS):  [x]Performed Review of systems (Integumentary, CardioPulmonary, Neurological) by intake and observation. Intake form has been scanned into medical record.  Patient has been instructed to contact their primary care physician regarding ROS issues if not already being prolonged functional positions   []Reduced ability or difficulty with changes of positions or transfers between positions   []Reduced ability to maintain good posture and demonstrate good body mechanics with sitting, bending, and lifting   [x]Reduced ability to sleep   [x] Reduced ability or tolerance with driving and/or computer work   [x]Reduced ability to perform lifting, carrying tasks   [x]Reduced ability to squat   []Reduced ability to forward bend   [x]Reduced ability to ambulate prolonged functional periods/distances/surfaces   [x]Reduced ability to ascend/descend stairs   [x]Reduced ability to run, hop or jump   []other:     Participation Restrictions   [x]Reduced participation in self care activities   [x]Reduced participation in home management activities   [x]Reduced participation in work activities   [x]Reduced participation in social activities. []Reduced participation in sport activities. Classification :    [x]Signs/symptoms consistent with post-surgical status including decreased ROM, strength and function.    []Signs/symptoms consistent with joint sprain/strain   []Signs/symptoms consistent with patella-femoral syndrome   []Signs/symptoms consistent with knee OA/hip OA   []Signs/symptoms consistent with internal derangement of knee/Hip   []Signs/symptoms consistent with functional hip weakness/NMR control      []Signs/symptoms consistent with tendinitis/tendinosis    []signs/symptoms consistent with pathology which may benefit from Dry needling      []other:      Prognosis/Rehab Potential:      [x]Excellent   []Good    []Fair   []Poor    Tolerance of evaluation/treatment:    [x]Excellent   []Good    []Fair   []Poor    Physical Therapy Evaluation Complexity Justification  [x] A history of present problem with:  [] no personal factors and/or comorbidities that impact the plan of care;  []1-2 personal factors and/or comorbidities that impact the plan of care  []3 personal factors and/or

## 2020-10-06 NOTE — LETTER
[facility] Rae  [address] [address]  Phone: [phone]  Fax: [phone]    [facility]        October 6, 2020     Patient: Jennifer Santana   YOB: 1965   Date of Visit: 10/6/2020       To Whom It May Concern: It is my medical opinion that Jan Ag requires a disability parking placard for the following reasons:  He cannot walk 200 feet without stopping to rest.  Duration of need: 6 months    If you have any questions or concerns, please don't hesitate to call.     Sincerely,      Joy Zabala MD

## 2020-10-06 NOTE — PROGRESS NOTES
Patient Name: Alisson Helton MRN: <[id number]>   Age: 53 y.o. YOB: 1965   Sex: male         CHIEF COMPLAINT   Total Knee postoperative visit    HISTORY OF PRESENT ILLNESS   Patient returns for their third postoperative evaluation status post total knee replacement. The patient is doing very fair. Swelling and pain better with wraps. They have small complaints of pain. There is small swelling about the knee. The patient has been doing physical therapy at home. They deny any calf swelling or numbness or tingling down the leg       Assessment      Review of Systems   Constitutional: Negative for chills and fever. HENT: Negative for nosebleeds. Eyes: Negative for double vision. Cardiovascular: Negative for chest pain. Gastrointestinal: Negative for abdominal pain. Musculoskeletal: Positive for joint pain and myalgias. Skin: Negative for rash. Neurological: Negative for seizures. Psychiatric/Behavioral: Negative for hallucinations. PHYSICAL EXAM     Vital Signs:   Vitals:    10/06/20 1425   BP: (!) 131/91   Pulse:    Temp:        Examination of the knee shows a well-healed midline incision. There is small swelling. There is no drainage. Range of motion is 2to 120. The patient is neurovascularly intact. NEUROLOGICALLY: There is no evidence for sensory or motor deficits in the extremity. Coordination appears full with no spacticity or rigidity. Reflexes appear to be symmetric. Distal circulation intact. No signs of RSD. Calf nontender. Negative delma's,  no signs of dvt    Hip exam shows full ROM with no focal pain or Instability. Leg lengths are normal. There is no Trendelenburg Gait. RADIOLOGY   Xray   Have reviewed the xrays above from 9/17/2020  3 views of the left knee AP, lateral and sunrise views and my impression is: Well-placed total knee prosthesis no evidence of loosening or fracture.     IMPRESSION   4 week(s) status post total knee replacement    PLAN   The patient is doing well 4 week(s) status post total knee replacement. The patient will finish up therapy. Significant improvements overall in the incision no drainage since initial visit  Gave patient refill of Lodine, tramadol, tizanidine and aspirin advised for utilization as needed of all the medications beyond the aspirin advised for utilization once daily especially as he begins to travel again.  -Advised patient follow-up in 4 to 6 weeks for repeat evaluation and repeat x-rays. They may slowly resume normal activities.       The orders below, if any, were placed during this visit:          ICD-10-CM    1. Status post total knee replacement not using cement, left  Z96.652 tiZANidine (ZANAFLEX) 4 MG tablet     aspirin 325 MG EC tablet     traMADol (ULTRAM) 50 MG tablet         Stevan Fernandes

## 2020-10-08 ENCOUNTER — HOSPITAL ENCOUNTER (OUTPATIENT)
Dept: PHYSICAL THERAPY | Age: 55
Setting detail: THERAPIES SERIES
Discharge: HOME | End: 2020-10-08
Payer: COMMERCIAL

## 2020-10-08 PROCEDURE — 97112 NEUROMUSCULAR REEDUCATION: CPT

## 2020-10-08 PROCEDURE — 97110 THERAPEUTIC EXERCISES: CPT

## 2020-10-08 NOTE — FLOWSHEET NOTE
[facility] - Outpatient Rehabilitation and Therapy, [facility]  [address] [facility], [facility]  Phone: [phone]   Fax:     [phone]      Physical Therapy Treatment Note/ Progress Report:     Date:  10/8/2020    Patient Name:  Eros North    :  1965  MRN: [id number]    Pertinent Medical History: HTN, R TKR    Medical/Treatment Diagnosis Information:  · Diagnosis: L TKR  · Treatment Diagnosis: Decreased mobility, strength    Insurance/Certification information:  PT Insurance Information: [place] [id number] - has used 4 visits)  Physician Information:  Referring Practitioner: Dr. Eugenio Driscoll of care signed (Y/N): sent for cosign 10/6 (received)    Date of Patient follow up with Physician: 10/6/20     Progress Report: []  Yes  [x]  No     Date Range for reporting period:  Beginning:  10/6/20  Ending:      Progress report due (10 Rx/or 30 days whichever is less):      Recertification due (POC duration/ or 90 days whichever is less):20     Visit # POC/Insurance Allowable Auth Needed   2 12 []Yes   [x]No     Latex Allergy:  [x]NO      []YES  Preferred Language for Healthcare:   [x]English       []other:  Functional Scale: LEFS =   53 Date assessed: at eval    Pain level:  1-2/10     History of Injury:Patient underwent L TKR on 20. Patient was discharged to home where he received home PT for a few weeks. Patient has been using straight cane for ambulation for the past week. This week he has been using the cane as needed. Currently doing stairs one at a time. SUBJECTIVE:  10/8: just some discomfort after last session from the stretching. OBJECTIVE:   Observation:   · Palpation: mild TTP around L ankle  · Functional Mobility/Transfers: independent  · Bandages/Dressings/Incisions: arrived to PT with ACE wrap from foot to above knee for edema management.   Incision healed with no signs of infection  · Gait: (include devices/WB status) Patient ambulates with straight cane WBAT. Patient demonstrates mild antalgic gait with decreased knee flexion at swing phase   Test measurements:    ROM:  Date           Knee Flexion       Knee Extension    Active Passive Active Passive    Eval 10/6 120 120 Lacking 15 Lacking 13            Strength:  Date Hip flexion Hip abduction Quad Hamstring Ankle DF Ankle PF   Eval 10/6 4/5 5/5 4-/5 4/5 4+/5 4-/5                RESTRICTIONS/PRECAUTIONS: none    Exercises/Interventions:     Therapeutic Ex (42432)   Min:35 Reps/Resistance Notes/CUES   Nu step L1 x7 mins    HS step stretch 3x20 sec     Knee flexion step stretch 3x20 sec    gastroc incline 3x20 sec     Mini squats  10x    Stand HR 20x    Step Up fwd 6\" 10x L Cuing for good quad and glute activation needed    Step downs 6\" 10x L    Tband TKE Add         Leg Press 70# 2x10 B    Seated FAQ 11# 2x10 L    Seated HS curls 15# 2x10 L         Long sit knee ext stretch 3x20 sec L    SLR flexion 1# 2x10 L    SAQ 1# 2x10 L              Manual Intervention (65283)  Min: 5     Knee PROM flex/ext  x5 mins supine   Patella Mobs Grade III 5x each               NMR re-education (24717)  Min:12  CUES NEEDED   Tandem stance x30 sec L/R    Bosu: balance Add     [place] estim to [employer] with SAQ 35 bps   10 sec on / 10 sec off  x10 mins         Therapeutic Activity (32773)  Min:               Modalities  Min: 10     CP with [place] estim above x10 mins L knee      Other Therapeutic Activities: Pt was educated on PT POC, Diagnosis, Prognosis, pathomechanics as well as frequency and duration of scheduling future physical therapy appointments. Time was also taken on this day to answer all patient questions and participation in PT. Reviewed appointment policy in detail with patient and patient verbalized understanding. Home Exercise Program: Patient was instructed in the following for HEP:  Long sitting knee ext stretch.  Patient verbalized/demonstrated understanding and was issued written handout. Therapeutic Exercise and NMR EXR  [x] (25898) Provided verbal/tactile cueing for activities related to strengthening, flexibility, endurance, ROM for improvements in LE, proximal hip, and core control with self care, mobility, lifting, ambulation.  [] (69840) Provided verbal/tactile cueing for activities related to improving balance, coordination, kinesthetic sense, posture, motor skill, proprioception  to assist with LE, proximal hip, and core control in self care, mobility, lifting, ambulation and eccentric single leg control.      NMR and Therapeutic Activities:    [x] (40884 or 63770) Provided verbal/tactile cueing for activities related to improving balance, coordination, kinesthetic sense, posture, motor skill, proprioception and motor activation to allow for proper function of core, proximal hip and LE with self care and ADLs and functional mobility.   [] (52676) Gait Re-education- Provided training and instruction to the patient for proper LE, core and proximal hip recruitment and positioning and eccentric body weight control with ambulation re-education including up and down stairs     Home Exercise Program:    [x] (66163) Reviewed/Progressed HEP activities related to strengthening, flexibility, endurance, ROM of core, proximal hip and LE for functional self-care, mobility, lifting and ambulation/stair navigation   [] (23783)Reviewed/Progressed HEP activities related to improving balance, coordination, kinesthetic sense, posture, motor skill, proprioception of core, proximal hip and LE for self care, mobility, lifting, and ambulation/stair navigation      Manual Treatments:  PROM / STM / Oscillations-Mobs:  G-I, II, III, IV (PA's, Inf., Post.)  [] (23956) Provided manual therapy to mobilize LE, proximal hip and/or LS spine soft tissue/joints for the purpose of modulating pain, promoting relaxation,  increasing ROM, reducing/eliminating soft tissue swelling/inflammation/restriction, improving soft tissue extensibility and allowing for proper ROM for normal function with self care, mobility, lifting and ambulation. Charges:  Timed Code Treatment Minutes: 52   Total Treatment Minutes: 52      [] EVAL (LOW) 59653 (typically 20 minutes face-to-face)  [] EVAL (MOD) 32912 (typically 30 minutes face-to-face)  [] EVAL (HIGH) 49516 (typically 45 minutes face-to-face)  [] RE-EVAL     [x] JX(26645) x 2    [] Dry needle 1 or 2 Muscles (17149)  [x] NMR (38932) x 1    [] Dry needle 3+ Muscles (85911)  [] Manual (82111) x     [] Ultrasound (61820) x  [] TA (39418) x     [] Mech Traction (68111)  [] ES(attended) (29227)     [] ES (un) (85288):   [] Vasopump (25951)  [] Ionto (75193)   [] Other:    GOALS:  Patient stated goal: \"Get back to normal activities\"  []? Progressing: []? Met: []? Not Met: []? Adjusted     Therapist goals for Patient:   Short Term Goals: To be achieved in: 2 weeks  1. Independent in HEP and progression per patient tolerance, in order to prevent re-injury. []? Progressing: []? Met: []? Not Met: []? Adjusted  2. Patient will have a decrease in pain by 40% to facilitate improvement in movement, function, and ADLs as indicated by Functional Deficits. []? Progressing: []? Met: []? Not Met: []? Adjusted     Long Term Goals: To be achieved in: at discharge  1. Disability index score of 20% or less for the LEFS to assist with reaching prior level of function. []? Progressing: []? Met: []? Not Met: []? Adjusted  2. Patient will demonstrate increased AROM to +8-125 to allow for proper joint functioning as indicated by patients Functional Deficits. []? Progressing: []? Met: []? Not Met: []? Adjusted  3. Patient will demonstrate an increase in Strength to 5/5 to allow for proper functional mobility as indicated by patients Functional Deficits. []? Progressing: []? Met: []? Not Met: []? Adjusted  4.  Patient will return to ambulating in community without AD and demonstrate good gait pattern without increased symptoms or restriction. []? Progressing: []? Met: []? Not Met: []? Adjusted  5. Patient will return to fitness center workout routine without increased symptoms or restrictions. []? Progressing: []? Met: []? Not Met: []? Adjusted         ASSESSMENT:  Patient with good tolerance to initiation of strengthening exercises. Quad contraction is decreased but shows good recruitment. Knee flexion PROM has been achieved, extension ROM needs improvement. Treatment/Activity Tolerance:  [x] Patient tolerated treatment well [] Patient limited by fatique  [] Patient limited by pain  [] Patient limited by other medical complications  [] Other:     Overall Progression Towards Functional goals/ Treatment Progress Update:  [] Patient is progressing as expected towards functional goals listed. [] Progression is slowed due to complexities/Impairments listed. [] Progression has been slowed due to co-morbidities. [x] Plan just implemented, too soon to assess goals progression <30days   [] Goals require adjustment due to lack of progress  [] Patient is not progressing as expected and requires additional follow up with physician  [] Other    Prognosis for POC: [x] Good [] Fair  [] Poor    Patient requires continued skilled intervention: [x] Yes  [] No        PLAN: Add above as stated  [] Continue per plan of care [] Alter current plan (see comments)  [x] Plan of care initiated [] Hold pending MD visit [] Discharge    Electronically signed by: Jairon Blas PT , OMT-C,  [id number]      Note: If patient does not return for scheduled/recommended follow up visits, this note will serve as a discharge from care along with the most recent update on progress.

## 2020-10-13 ENCOUNTER — HOSPITAL ENCOUNTER (OUTPATIENT)
Dept: PHYSICAL THERAPY | Age: 55
Setting detail: THERAPIES SERIES
Discharge: HOME | End: 2020-10-13
Payer: COMMERCIAL

## 2020-10-13 PROCEDURE — 97112 NEUROMUSCULAR REEDUCATION: CPT

## 2020-10-13 PROCEDURE — 97110 THERAPEUTIC EXERCISES: CPT

## 2020-10-13 NOTE — FLOWSHEET NOTE
[place] [facility], [facility]  [address] [place], [facility]  Phone: [phone]   Fax:     [phone]      Physical Therapy Treatment Note/ Progress Report:     Date:  10/13/2020    Patient Name:  Kimberly Marino    :  1965  MRN: [id number]    Pertinent Medical History: HTN, R TKR    Medical/Treatment Diagnosis Information:  · Diagnosis: L TKR  · Treatment Diagnosis: Decreased mobility, strength    Insurance/Certification information:  PT Insurance Information: [place] [phone] - has used 4 visits)  Physician Information:  Referring Practitioner: Dr. Clayton Hawk of care signed (Y/N): sent for cosign 10/6 (received)    Date of Patient follow up with Physician: 10/6/20     Progress Report: []  Yes  [x]  No     Date Range for reporting period:  Beginning:  10/6/20  Ending:      Progress report due (10 Rx/or 30 days whichever is less):      Recertification due (POC duration/ or 90 days whichever is less):20     Visit # POC/Insurance Allowable Auth Needed   3 12 []Yes   [x]No     Latex Allergy:  [x]NO      []YES  Preferred Language for Healthcare:   [x]English       []other:  Functional Scale: LEFS =   53 Date assessed: at eval    Pain level:  1/10     History of Injury:Patient underwent L TKR on 20. Patient was discharged to home where he received home PT for a few weeks. Patient has been using straight cane for ambulation for the past week. This week he has been using the cane as needed. Currently doing stairs one at a time. SUBJECTIVE:  10/8: just some discomfort after last session from the stretching. 10/13: just some discomfort in the knee. Did well after last PT session.      OBJECTIVE:   Observation:   · Palpation: mild TTP around L ankle  · Functional Mobility/Transfers: independent  · Bandages/Dressings/Incisions: arrived to PT with ACE wrap from foot to above knee for edema management. Incision healed with no signs of infection  · Gait: (include devices/WB status) Patient ambulates with straight cane WBAT. Patient demonstrates mild antalgic gait with decreased knee flexion at swing phase   Test measurements:    ROM:  Date           Knee Flexion       Knee Extension    Active Passive Active Passive    Eval 10/6 120 120 Lacking 15 Lacking 13            Strength:  Date Hip flexion Hip abduction Quad Hamstring Ankle DF Ankle PF   Eval 10/6 4/5 5/5 4-/5 4/5 4+/5 4-/5                RESTRICTIONS/PRECAUTIONS: none    Exercises/Interventions:     Therapeutic Ex (19504)   Min:35 Reps/Resistance Notes/CUES   Nu step L1 x7 mins    HS step stretch 3x20 sec     Knee flexion step stretch 3x20 sec    gastroc incline 3x20 sec     Mini squats  10x    Stand HR 20x    Step Up fwd 8\" 10x L Cuing for good quad and glute activation needed    Step downs 6\" 10x L Cuing needed for controlled eccentric descent   Tband TKE Blue 20x L Tactile cuing needed to avoid weight shifting and isolate movement to just the knee joint        Leg Press 70# 2x10 B    Seated FAQ 14# 2x10 L    Seated HS curls 15# 2x10 L    MHE flexion           abduction Add  add         Long sit knee ext stretch 3x20 sec L    SLR flexion 1# 2x10 L    SAQ 1# 2x10 L              Manual Intervention (60867)  Min: 5     Knee PROM flex/ext  x5 mins supine   Patella Mobs Grade III 5x each               NMR re-education (19787)  Min:12  CUES NEEDED   Tandem stance x30 sec L/R    Bosu: balance             squats x30 sec   add    [place] estim to [employer] with SAQ 35 bps   10 sec on / 10 sec off  x10 mins         Therapeutic Activity (20561)  Min:               Modalities  Min: 10     CP with [place] estim above x10 mins L knee      Other Therapeutic Activities: Pt was educated on PT POC, Diagnosis, Prognosis, pathomechanics as well as frequency and duration of scheduling future physical therapy appointments.  Time was also taken on this day to answer all patient questions and participation in PT. Reviewed appointment policy in detail with patient and patient verbalized understanding. Home Exercise Program: Patient was instructed in the following for HEP:  Long sitting knee ext stretch. Patient verbalized/demonstrated understanding and was issued written handout. Therapeutic Exercise and NMR EXR  [x] (60819) Provided verbal/tactile cueing for activities related to strengthening, flexibility, endurance, ROM for improvements in LE, proximal hip, and core control with self care, mobility, lifting, ambulation.  [] (41927) Provided verbal/tactile cueing for activities related to improving balance, coordination, kinesthetic sense, posture, motor skill, proprioception  to assist with LE, proximal hip, and core control in self care, mobility, lifting, ambulation and eccentric single leg control.      NMR and Therapeutic Activities:    [x] (72296 or 11759) Provided verbal/tactile cueing for activities related to improving balance, coordination, kinesthetic sense, posture, motor skill, proprioception and motor activation to allow for proper function of core, proximal hip and LE with self care and ADLs and functional mobility.   [] (88724) Gait Re-education- Provided training and instruction to the patient for proper LE, core and proximal hip recruitment and positioning and eccentric body weight control with ambulation re-education including up and down stairs     Home Exercise Program:    [x] (08538) Reviewed/Progressed HEP activities related to strengthening, flexibility, endurance, ROM of core, proximal hip and LE for functional self-care, mobility, lifting and ambulation/stair navigation   [] (66348)Reviewed/Progressed HEP activities related to improving balance, coordination, kinesthetic sense, posture, motor skill, proprioception of core, proximal hip and LE for self care, mobility, lifting, and ambulation/stair navigation      Manual Treatments:  PROM / STM / Oscillations-Mobs:  G-I, II, III, IV (PA's, Inf., Post.)  [] (53840) Provided manual therapy to mobilize LE, proximal hip and/or LS spine soft tissue/joints for the purpose of modulating pain, promoting relaxation,  increasing ROM, reducing/eliminating soft tissue swelling/inflammation/restriction, improving soft tissue extensibility and allowing for proper ROM for normal function with self care, mobility, lifting and ambulation. Charges:  Timed Code Treatment Minutes: 52   Total Treatment Minutes: 52      [] EVAL (LOW) 27823 (typically 20 minutes face-to-face)  [] EVAL (MOD) 42765 (typically 30 minutes face-to-face)  [] EVAL (HIGH) 13392 (typically 45 minutes face-to-face)  [] RE-EVAL     [x] LE(32956) x 2    [] Dry needle 1 or 2 Muscles (70687)  [x] NMR (50144) x 1    [] Dry needle 3+ Muscles (86585)  [] Manual (99376) x     [] Ultrasound (78782) x  [] TA (54478) x     [] Mech Traction (67189)  [] ES(attended) (12898)     [] ES (un) (29775):   [] Vasopump (31242)  [] Ionto (52753)   [] Other:    GOALS:  Patient stated goal: \"Get back to normal activities\"  []? Progressing: []? Met: []? Not Met: []? Adjusted     Therapist goals for Patient:   Short Term Goals: To be achieved in: 2 weeks  1. Independent in HEP and progression per patient tolerance, in order to prevent re-injury. []? Progressing: []? Met: []? Not Met: []? Adjusted  2. Patient will have a decrease in pain by 40% to facilitate improvement in movement, function, and ADLs as indicated by Functional Deficits. []? Progressing: []? Met: []? Not Met: []? Adjusted     Long Term Goals: To be achieved in: at discharge  1. Disability index score of 20% or less for the LEFS to assist with reaching prior level of function. []? Progressing: []? Met: []? Not Met: []? Adjusted  2. Patient will demonstrate increased AROM to +8-125 to allow for proper joint functioning as indicated by patients Functional Deficits. []? Progressing: []? Met: []?  Not Met: []? Adjusted  3. Patient will demonstrate an increase in Strength to 5/5 to allow for proper functional mobility as indicated by patients Functional Deficits. []? Progressing: []? Met: []? Not Met: []? Adjusted  4. Patient will return to ambulating in community without AD and demonstrate good gait pattern without increased symptoms or restriction. []? Progressing: []? Met: []? Not Met: []? Adjusted  5. Patient will return to fitness center workout routine without increased symptoms or restrictions. []? Progressing: []? Met: []? Not Met: []? Adjusted         ASSESSMENT:  Patient with good tolerance to initiation of strengthening exercises. Quad contraction is decreased but shows good recruitment. Eccentric quad control requires cuing. Knee flexion PROM has been achieved, extension ROM needs improvement. Treatment/Activity Tolerance:  [x] Patient tolerated treatment well [] Patient limited by fatique  [] Patient limited by pain  [] Patient limited by other medical complications  [] Other:     Overall Progression Towards Functional goals/ Treatment Progress Update:  [] Patient is progressing as expected towards functional goals listed. [] Progression is slowed due to complexities/Impairments listed. [] Progression has been slowed due to co-morbidities.   [x] Plan just implemented, too soon to assess goals progression <30days   [] Goals require adjustment due to lack of progress  [] Patient is not progressing as expected and requires additional follow up with physician  [] Other    Prognosis for POC: [x] Good [] Fair  [] Poor    Patient requires continued skilled intervention: [x] Yes  [] No        PLAN: Add above as stated  [x] Continue per plan of care [] Alter current plan (see comments)  [] Plan of care initiated [] Hold pending MD visit [] Discharge    Electronically signed by: Martine Schafer PT , OMT-C,  [id number]      Note: If patient does not return for scheduled/recommended follow up visits, this note will serve as a discharge from care along with the most recent update on progress.

## 2020-10-15 ENCOUNTER — HOSPITAL ENCOUNTER (OUTPATIENT)
Dept: PHYSICAL THERAPY | Age: 55
Setting detail: THERAPIES SERIES
Discharge: HOME | End: 2020-10-15
Payer: COMMERCIAL

## 2020-10-15 PROCEDURE — 97110 THERAPEUTIC EXERCISES: CPT

## 2020-10-15 PROCEDURE — 97112 NEUROMUSCULAR REEDUCATION: CPT

## 2020-10-15 NOTE — FLOWSHEET NOTE
[place] [facility], [facility]  [address] [facility] [place], [facility]  Phone: [phone]   Fax:     [phone]      Physical Therapy Treatment Note/ Progress Report:     Date:  10/15/2020    Patient Name:  Fili Real    :  1965  MRN: [id number]    Pertinent Medical History: HTN, R TKR    Medical/Treatment Diagnosis Information:  · Diagnosis: L TKR  · Treatment Diagnosis: Decreased mobility, strength    Insurance/Certification information:  PT Insurance Information: Fiona Matoskailyn Govind [phone] - has used 4 visits)  Physician Information:  Referring Practitioner: Dr. Alberto Mae of care signed (Y/N): sent for cosign 10/6 (received)    Date of Patient follow up with Physician: 10/6/20     Progress Report: []  Yes  [x]  No     Date Range for reporting period:  Beginning:  10/6/20  Ending:      Progress report due (10 Rx/or 30 days whichever is less):      Recertification due (POC duration/ or 90 days whichever is less):20     Visit # POC/Insurance Allowable Auth Needed   4 12 []Yes   [x]No     Latex Allergy:  [x]NO      []YES  Preferred Language for Healthcare:   [x]English       []other:  Functional Scale: LEFS =   53 Date assessed: at eval    Pain level:  1/10     History of Injury:Patient underwent L TKR on 20. Patient was discharged to home where he received home PT for a few weeks. Patient has been using straight cane for ambulation for the past week. This week he has been using the cane as needed. Currently doing stairs one at a time. SUBJECTIVE:  10/8: just some discomfort after last session from the stretching. 10/13: just some discomfort in the knee. Did well after last PT session. 10/15: able to walk dogs last night about a mile. Had to stop a few times due to LBP. Notes some mild increased swelling afterwards in the knee. Made sure he elevated and iced when he got home. OBJECTIVE:   Observation:   · Palpation: mild TTP around L ankle  · Functional Mobility/Transfers: independent  · Bandages/Dressings/Incisions: arrived to PT with ACE wrap from foot to above knee for edema management. Incision healed with no signs of infection  · Gait: (include devices/WB status) Patient ambulates with straight cane WBAT.   Patient demonstrates mild antalgic gait with decreased knee flexion at swing phase   Test measurements:    ROM:  Date           Knee Flexion       Knee Extension    Active Passive Active Passive    Eval 10/6 120 120 Lacking 15 Lacking 13            Strength:  Date Hip flexion Hip abduction Quad Hamstring Ankle DF Ankle PF   Eval 10/6 4/5 5/5 4-/5 4/5 4+/5 4-/5                RESTRICTIONS/PRECAUTIONS: none    Exercises/Interventions:     Therapeutic Ex (76693)   Min:35 Reps/Resistance Notes/CUES   Nu step L1 x7 mins    HS step stretch 3x20 sec     Knee flexion step stretch 3x20 sec    gastroc incline 3x20 sec     Mini squats  10x    Stand SL HR 10x L     Step Up fwd 8\" 10x L Cuing for good quad and glute activation needed    Step downs 6\" 10x L Cuing needed for controlled eccentric descent   Tband TKE Blue 20x L Tactile cuing needed to avoid weight shifting and isolate movement to just the knee joint        Leg Press 70# 2x10 B Increase weight and do sets with single leg next session   Seated FAQ 15# 2x10 L    Seated HS curls 15# 2x10 L    MHE flexion           abduction 25# 1x10 L/R  25# 1x10 L/R         Long sit knee ext stretch 3x20 sec L    SLR flexion 1# 2x10 L    SAQ 1# 2x10 L    Prone single LE lift            Single UE lift 1x10 L/R  1x10 L/R         Manual Intervention (04471)  Min: 5     Knee PROM flex/ext  x5 mins supine   Patella Mobs Grade III 5x each               NMR re-education (32155)  Min:15  CUES NEEDED   Tandem stance x30 sec L/R    SLS Add     Bosu: balance             squats x30 sec   10x With slight squat   Qped opp UE/LE lift 5x L LE, 3 sec hold  3x R LE, 3 sec hold Verbal/tactile cuing required for core activation   Only able to tolerate 3 reps with L knee down due to the increased pressure/pain on L knee   Pulley multifidus walkout add    [place] estim to [employer] with SAQ 35 bps   10 sec on / 10 sec off  x10 mins         Therapeutic Activity (69591)  Min:     [employer] board f/b, s/s add         Modalities  Min: 10     CP with [place] estim above x10 mins L knee      Other Therapeutic Activities: Pt was educated on PT POC, Diagnosis, Prognosis, pathomechanics as well as frequency and duration of scheduling future physical therapy appointments. Time was also taken on this day to answer all patient questions and participation in PT. Reviewed appointment policy in detail with patient and patient verbalized understanding. 10/15: patient questioned playing in a golf outing next week. Patient advised to participate in short chipping and putting only at this time due to lack of quad strength. Patient advised to take some practice swings prior to outing. Patient verbalized understanding. Home Exercise Program: Patient was instructed in the following for HEP:  Long sitting knee ext stretch. Patient verbalized/demonstrated understanding and was issued written handout. Therapeutic Exercise and NMR EXR  [x] (04983) Provided verbal/tactile cueing for activities related to strengthening, flexibility, endurance, ROM for improvements in LE, proximal hip, and core control with self care, mobility, lifting, ambulation.  [] (77602) Provided verbal/tactile cueing for activities related to improving balance, coordination, kinesthetic sense, posture, motor skill, proprioception  to assist with LE, proximal hip, and core control in self care, mobility, lifting, ambulation and eccentric single leg control.      NMR and Therapeutic Activities:    [x] (22680 or 04054) Provided verbal/tactile cueing for activities related to improving balance, coordination, kinesthetic sense, posture, motor skill, proprioception and motor activation to allow for proper function of core, proximal hip and LE with self care and ADLs and functional mobility.   [] (80787) Gait Re-education- Provided training and instruction to the patient for proper LE, core and proximal hip recruitment and positioning and eccentric body weight control with ambulation re-education including up and down stairs     Home Exercise Program:    [x] (36838) Reviewed/Progressed HEP activities related to strengthening, flexibility, endurance, ROM of core, proximal hip and LE for functional self-care, mobility, lifting and ambulation/stair navigation   [] (69360)Reviewed/Progressed HEP activities related to improving balance, coordination, kinesthetic sense, posture, motor skill, proprioception of core, proximal hip and LE for self care, mobility, lifting, and ambulation/stair navigation      Manual Treatments:  PROM / STM / Oscillations-Mobs:  G-I, II, III, IV (PA's, Inf., Post.)  [] (87454) Provided manual therapy to mobilize LE, proximal hip and/or LS spine soft tissue/joints for the purpose of modulating pain, promoting relaxation,  increasing ROM, reducing/eliminating soft tissue swelling/inflammation/restriction, improving soft tissue extensibility and allowing for proper ROM for normal function with self care, mobility, lifting and ambulation.        Charges:  Timed Code Treatment Minutes: 55   Total Treatment Minutes: 55      [] EVAL (LOW) 67705 (typically 20 minutes face-to-face)  [] EVAL (MOD) 65839 (typically 30 minutes face-to-face)  [] EVAL (HIGH) 95719 (typically 45 minutes face-to-face)  [] RE-EVAL     [x] MICHAEL(76384) x 2    [] Dry needle 1 or 2 Muscles (83581)  [x] NMR (40388) x 1    [] Dry needle 3+ Muscles (16717)  [] Manual (42622) x     [] Ultrasound (11326) x  [] TA (10632) x     [] Mech Traction (60380)  [] ES(attended) (99150)     [] ES (un) (78295):   [] Vasopump (24599)  [] Ionto (22058)   [] kia  [] Patient limited by pain  [] Patient limited by other medical complications  [] Other:     Overall Progression Towards Functional goals/ Treatment Progress Update:  [] Patient is progressing as expected towards functional goals listed. [] Progression is slowed due to complexities/Impairments listed. [] Progression has been slowed due to co-morbidities. [x] Plan just implemented, too soon to assess goals progression <30days   [] Goals require adjustment due to lack of progress  [] Patient is not progressing as expected and requires additional follow up with physician  [] Other    Prognosis for POC: [x] Good [] Fair  [] Poor    Patient requires continued skilled intervention: [x] Yes  [] No        PLAN: Add above as stated  [x] Continue per plan of care [] Alter current plan (see comments)  [] Plan of care initiated [] Hold pending MD visit [] Discharge    Electronically signed by: Xin Bar PT , OMT-C,  [id number]      Note: If patient does not return for scheduled/recommended follow up visits, this note will serve as a discharge from care along with the most recent update on progress.

## 2020-10-20 ENCOUNTER — HOSPITAL ENCOUNTER (OUTPATIENT)
Dept: PHYSICAL THERAPY | Age: 55
Setting detail: THERAPIES SERIES
Discharge: HOME | End: 2020-10-20
Payer: COMMERCIAL

## 2020-10-20 PROCEDURE — G0283 ELEC STIM OTHER THAN WOUND: HCPCS

## 2020-10-20 PROCEDURE — 97112 NEUROMUSCULAR REEDUCATION: CPT

## 2020-10-20 PROCEDURE — 97110 THERAPEUTIC EXERCISES: CPT

## 2020-10-20 NOTE — FLOWSHEET NOTE
[place] [facility], [facility]  [address] [facility]  Phone: [phone]   Fax:     [phone]      Physical Therapy Treatment Note/ Progress Report:     Date:  10/20/2020    Patient Name:  Christopher Grover    :  1965  MRN: [id number]    Pertinent Medical History: HTN, R TKR    Medical/Treatment Diagnosis Information:  · Diagnosis: L TKR  · Treatment Diagnosis: Decreased mobility, strength    Insurance/Certification information:  PT Insurance Information: Fiona Faust [phone] - has used 4 visits)  Physician Information:  Referring Practitioner: Dr. Mabel Lim of care signed (Y/N): sent for cosign 10/6 (received)    Date of Patient follow up with Physician: 10/6/20     Progress Report: []  Yes  [x]  No     Date Range for reporting period:  Beginning:  10/6/20  Ending:      Progress report due (10 Rx/or 30 days whichever is less):      Recertification due (POC duration/ or 90 days whichever is less):20     Visit # POC/Insurance Allowable Auth Needed   5 12 []Yes   [x]No     Latex Allergy:  [x]NO      []YES  Preferred Language for Healthcare:   [x]English       []other:  Functional Scale: LEFS =   53 Date assessed: at eval    Pain level:  0-/10     History of Injury:Patient underwent L TKR on 20. Patient was discharged to home where he received home PT for a few weeks. Patient has been using straight cane for ambulation for the past week. This week he has been using the cane as needed. Currently doing stairs one at a time. SUBJECTIVE:  10/8: just some discomfort after last session from the stretching. 10/13: just some discomfort in the knee. Did well after last PT session. 10/15: able to walk dogs last night about a mile. Had to stop a few times due to LBP. Notes some mild increased swelling afterwards in the knee. Made sure he elevated and iced when he got home.    10/20 - No longer using the cane      OBJECTIVE:   Observation:   · Palpation: mild TTP around L ankle  · Functional Mobility/Transfers: independent  · Bandages/Dressings/Incisions: arrived to PT with ACE wrap from foot to above knee for edema management.   Incision healed with no signs of infection   Gait:10/20 -  Normal knee flexion in swing, good extension at heel strike     ROM:  Date           Knee Flexion       Knee Extension    Active Passive Active Passive    Eval 10/6 120 120 Lacking 15 Lacking 13            Strength:  Date Hip flexion Hip abduction Quad Hamstring Ankle DF Ankle PF   Eval 10/6 4/5 5/5 4-/5 4/5 4+/5 4-/5                RESTRICTIONS/PRECAUTIONS: none    Exercises/Interventions:     Therapeutic Ex (42362)   Min:35 Reps/Resistance Notes/CUES   Nu step  Seat  9 no UE L2  X 5 min    HS step stretch 3x20 sec     Knee flexion step stretch 3x20 sec    gastroc incline     3x20 sec     Mini squats   Fitter 1 thin   10x  X 10    Stand SL HR 10x L     Step Up fwd 8\" 10x L Cuing for good quad and glute activation needed    Step downs 6\" 10x L Cuing needed for controlled eccentric descent   Tband TKE Blue 20x L Tactile cuing needed to avoid weight shifting and isolate movement to just the knee joint        Leg Press 70# 2x10 B Increase weight and do sets with single leg next session   Seated FAQ 17 # 2x10 L    Seated HS curls 17 # 2x10 L    MHE flexion           abduction 25# 1x10 L/R  25# 1x10 L/R         Long sit knee ext stretch 3x20 sec L    SLR flexion 1# 2x10 L    SAQ     1# 2x10 L    Prone single LE lift            Single UE lift 1x10 L/R  1x10 L/R         Manual Intervention (89594)  Min: 5     Knee PROM flex/ext  x5 mins supine   Patella Mobs Grade III 5x each               NMR re-education (91520)  Min:15  CUES NEEDED   Tandem stance x30 sec L/R    SLS Add     Bosu: balance             squats x30 sec   10x With slight squat   Standing opp UE/LE X 5 diag R/L  3 sec hold   Balanced with hand on bar Pulley multifidus walkout   Blue tband x 5 each R/L    [place] estim to [employer] with SAQ 35 bps   10 sec on / 10 sec off  x10 mins         Therapeutic Activity (94855)  Min:     [employer] board f/b, s/s Side to side one minute         Modalities  Min: 10     CP with [place] estim above x10 mins L knee      Other Therapeutic Activities: Pt was educated on PT POC, Diagnosis, Prognosis, pathomechanics as well as frequency and duration of scheduling future physical therapy appointments. Time was also taken on this day to answer all patient questions and participation in PT. Reviewed appointment policy in detail with patient and patient verbalized understanding. 10/15: patient questioned playing in a golf outing next week. Patient advised to participate in short chipping and putting only at this time due to lack of quad strength. Patient advised to take some practice swings prior to outing. Patient verbalized understanding. Home Exercise Program: Patient was instructed in the following for HEP:  Long sitting knee ext stretch. Patient verbalized/demonstrated understanding and was issued written handout. Therapeutic Exercise and NMR EXR  [x] (30492) Provided verbal/tactile cueing for activities related to strengthening, flexibility, endurance, ROM for improvements in LE, proximal hip, and core control with self care, mobility, lifting, ambulation.  [] (53554) Provided verbal/tactile cueing for activities related to improving balance, coordination, kinesthetic sense, posture, motor skill, proprioception  to assist with LE, proximal hip, and core control in self care, mobility, lifting, ambulation and eccentric single leg control.      NMR and Therapeutic Activities:    [x] (75198 or 58456) Provided verbal/tactile cueing for activities related to improving balance, coordination, kinesthetic sense, posture, motor skill, proprioception and motor activation to allow for proper function of core, proximal hip and LE with self care and ADLs and functional mobility.   [] (12237) Gait Re-education- Provided training and instruction to the patient for proper LE, core and proximal hip recruitment and positioning and eccentric body weight control with ambulation re-education including up and down stairs     Home Exercise Program:    [x] (92696) Reviewed/Progressed HEP activities related to strengthening, flexibility, endurance, ROM of core, proximal hip and LE for functional self-care, mobility, lifting and ambulation/stair navigation   [] (09906)Reviewed/Progressed HEP activities related to improving balance, coordination, kinesthetic sense, posture, motor skill, proprioception of core, proximal hip and LE for self care, mobility, lifting, and ambulation/stair navigation      Manual Treatments:  PROM / STM / Oscillations-Mobs:  G-I, II, III, IV (PA's, Inf., Post.)  [] (39712) Provided manual therapy to mobilize LE, proximal hip and/or LS spine soft tissue/joints for the purpose of modulating pain, promoting relaxation,  increasing ROM, reducing/eliminating soft tissue swelling/inflammation/restriction, improving soft tissue extensibility and allowing for proper ROM for normal function with self care, mobility, lifting and ambulation. Charges:  Timed Code Treatment Minutes: 55   Total Treatment Minutes: 55      [] EVAL (LOW) 15898 (typically 20 minutes face-to-face)  [] EVAL (MOD) 63432 (typically 30 minutes face-to-face)  [] EVAL (HIGH) 87391 (typically 45 minutes face-to-face)  [] RE-EVAL     [x] KR(30490) x 2    [] Dry needle 1 or 2 Muscles (54287)  [x] NMR (60301) x 1    [] Dry needle 3+ Muscles (90314)  [] Manual (50480) x     [] Ultrasound (98140) x  [] TA (96818) x     [] Mech Traction (64767)  [] ES(attended) (13530)     [x] ES (un) (01946):   [] Vasopump (20039)  [] Ionto (36213)   [] Other:    GOALS:  Patient stated goal: \"Get back to normal activities\"  []? Progressing: []? Met: []? Not Met: []?  Adjusted     Therapist goals for Patient:   Short Term Goals: To be achieved in: 2 weeks  1. Independent in HEP and progression per patient tolerance, in order to prevent re-injury. []? Progressing: []? Met: []? Not Met: []? Adjusted  2. Patient will have a decrease in pain by 40% to facilitate improvement in movement, function, and ADLs as indicated by Functional Deficits. []? Progressing: []? Met: []? Not Met: []? Adjusted     Long Term Goals: To be achieved in: at discharge  1. Disability index score of 20% or less for the LEFS to assist with reaching prior level of function. []? Progressing: []? Met: []? Not Met: []? Adjusted  2. Patient will demonstrate increased AROM to +8-125 to allow for proper joint functioning as indicated by patients Functional Deficits. []? Progressing: []? Met: []? Not Met: []? Adjusted  3. Patient will demonstrate an increase in Strength to 5/5 to allow for proper functional mobility as indicated by patients Functional Deficits. []? Progressing: []? Met: []? Not Met: []? Adjusted  4. Patient will return to ambulating in community without AD and demonstrate good gait pattern without increased symptoms or restriction. []? Progressing: []? Met: []? Not Met: []? Adjusted  5. Patient will return to fitness center workout routine without increased symptoms or restrictions. []? Progressing: []? Met: []? Not Met: []? Adjusted         ASSESSMENT:  Patient with good tolerance to progression of proprioceptive exercises. Quad contraction is decreased but shows good recruitment. Eccentric quad control requires cuing. Knee flexion PROM has been achieved, extension ROM needs improvement. Core recruitment and endurance is poor which is limiting prolonged standing and ambulation.     Treatment/Activity Tolerance:  [x] Patient tolerated treatment well [] Patient limited by fatique  [] Patient limited by pain  [] Patient limited by other medical complications  [] Other:     Overall Progression Towards Functional goals/ Treatment Progress Update:  [] Patient is progressing as expected towards functional goals listed. [] Progression is slowed due to complexities/Impairments listed. [] Progression has been slowed due to co-morbidities. [x] Plan just implemented, too soon to assess goals progression <30days   [] Goals require adjustment due to lack of progress  [] Patient is not progressing as expected and requires additional follow up with physician  [] Other    Prognosis for POC: [x] Good [] Fair  [] Poor    Patient requires continued skilled intervention: [x] Yes  [] No        PLAN: Add above as stated  [x] Continue per plan of care [] Alter current plan (see comments)  [] Plan of care initiated [] Hold pending MD visit [] Discharge    Electronically signed by: Reena Trujillo PT ,DPT, MS  [id number]      Note: If patient does not return for scheduled/recommended follow up visits, this note will serve as a discharge from care along with the most recent update on progress.

## 2020-10-22 ENCOUNTER — HOSPITAL ENCOUNTER (OUTPATIENT)
Dept: PHYSICAL THERAPY | Age: 55
Setting detail: THERAPIES SERIES
Discharge: HOME | End: 2020-10-22
Payer: COMMERCIAL

## 2020-10-22 PROCEDURE — 97110 THERAPEUTIC EXERCISES: CPT

## 2020-10-22 PROCEDURE — 97112 NEUROMUSCULAR REEDUCATION: CPT

## 2020-10-22 NOTE — FLOWSHEET NOTE
[place] [facility], [facility]  [address] [facility], [facility]  Phone: [phone]   Fax:     [phone]      Physical Therapy Treatment Note/ Progress Report:     Date:  10/22/2020    Patient Name:  Kendrick Whitney    :  1965  MRN: [id number]    Pertinent Medical History: HTN, R TKR    Medical/Treatment Diagnosis Information:  · Diagnosis: L TKR  · Treatment Diagnosis: Decreased mobility, strength    Insurance/Certification information:  PT Insurance Information: Souzajeffrey Osborn [phone] [id number] - has used 4 visits)  Physician Information:  Referring Practitioner: Dr. Ravi Jose of care signed (Y/N): sent for cosign 10/6 (received)    Date of Patient follow up with Physician: 10/6/20     Progress Report: []  Yes  [x]  No     Date Range for reporting period:  Beginning:  10/6/20  Ending:      Progress report due (10 Rx/or 30 days whichever is less): 90/3/39     Recertification due (POC duration/ or 90 days whichever is less):20     Visit # POC/Insurance Allowable Auth Needed   6 12 []Yes   [x]No     Latex Allergy:  [x]NO      []YES  Preferred Language for Healthcare:   [x]English       []other:  Functional Scale: LEFS =   53 Date assessed: at eval    Pain level:  0/10     History of Injury:Patient underwent L TKR on 20. Patient was discharged to home where he received home PT for a few weeks. Patient has been using straight cane for ambulation for the past week. This week he has been using the cane as needed. Currently doing stairs one at a time. SUBJECTIVE:  10/8: just some discomfort after last session from the stretching. 10/13: just some discomfort in the knee. Did well after last PT session. 10/15: able to walk dogs last night about a mile. Had to stop a few times due to LBP. Notes some mild increased swelling afterwards in the knee. Made sure he elevated and iced when he got home.    10/20 - No longer using the cane  10/22: no complaints of pain just discomfort. Wearing ACE wrap as needed for swelling. Notes his back pain is also improving. OBJECTIVE:   Observation:   · Palpation: mild TTP around L ankle  · Functional Mobility/Transfers: independent  · Bandages/Dressings/Incisions: arrived to PT with ACE wrap from foot to above knee for edema management.   Incision healed with no signs of infection  · Gait:10/20 -  Normal knee flexion in swing, good extension at heel strike     ROM:  Date           Knee Flexion       Knee Extension    Active Passive Active Passive    Eval 10/6 120 120 Lacking 15 Lacking 13            Strength:  Date Hip flexion Hip abduction Quad Hamstring Ankle DF Ankle PF   Eval 10/6 4/5 5/5 4-/5 4/5 4+/5 4-/5                RESTRICTIONS/PRECAUTIONS: none    Exercises/Interventions:     Therapeutic Ex (46075)   Min:35 Reps/Resistance Notes/CUES        Nu step  Seat  9 no UE L2  x 7 mins    HS step stretch 3x20 sec     Knee flexion step stretch 3x20 sec    gastroc incline 3x20 sec     Mini squats  10x    Stand SL HR 10x L     Step Up fwd 8\" 10x L Cuing for good quad and glute activation needed    Step downs 6\" 10x L Cuing needed for controlled eccentric descent   Tband TKE Blue 20x L Tactile cuing needed to avoid weight shifting and isolate movement to just the knee joint        Leg Press 80# 2x10 B  50# 2x10 L    Seated FAQ 17 # 2x10 L    Seated HS curls 17 # 2x10 L    MHE flexion           abduction 25# 1x10 L/R  25# 1x10 L/R         Long sit knee ext stretch 3x20 sec L    SLR flexion 1# 2x10 L    SAQ 1# 2x10 L    Prone opp UE/LE lift 5x L/R         Manual Intervention (88131)  Min: 5     Knee PROM flex/ext  x5 mins supine   Patella Mobs Grade III 5x each               NMR re-education (82774)  Min:25  CUES NEEDED   Tandem stance x30 sec L/R    SLS Add     Bosu: balance             squats x30 sec   10x With slight squat   Prone over ball opp UE/LE 5x 3 sec hold        Pulley multifidus walkout   10# 5x each R/L Cuing for TA activation prior to walking out   [place] estim to [place] with SAQ 35 bps   10 sec on / 10 sec off  x10 mins         Therapeutic Activity (81669)  Min:3     [employer] board f/b, s/s x1 min each          Modalities  Min: 10     CP with [place] estim above x10 mins L knee      Other Therapeutic Activities: Pt was educated on PT POC, Diagnosis, Prognosis, pathomechanics as well as frequency and duration of scheduling future physical therapy appointments. Time was also taken on this day to answer all patient questions and participation in PT. Reviewed appointment policy in detail with patient and patient verbalized understanding. 10/15: patient questioned playing in a golf outing next week. Patient advised to participate in short chipping and putting only at this time due to lack of quad strength. Patient advised to take some practice swings prior to outing. Patient verbalized understanding. Home Exercise Program: Patient was instructed in the following for HEP:  Long sitting knee ext stretch. Patient verbalized/demonstrated understanding and was issued written handout. Therapeutic Exercise and NMR EXR  [x] (83319) Provided verbal/tactile cueing for activities related to strengthening, flexibility, endurance, ROM for improvements in LE, proximal hip, and core control with self care, mobility, lifting, ambulation.  [] (49682) Provided verbal/tactile cueing for activities related to improving balance, coordination, kinesthetic sense, posture, motor skill, proprioception  to assist with LE, proximal hip, and core control in self care, mobility, lifting, ambulation and eccentric single leg control.      NMR and Therapeutic Activities:    [x] (95061 or 98998) Provided verbal/tactile cueing for activities related to improving balance, coordination, kinesthetic sense, posture, motor skill, proprioception and motor activation to allow for proper function of core, proximal Adjusted     Therapist goals for Patient:   Short Term Goals: To be achieved in: 2 weeks  1. Independent in HEP and progression per patient tolerance, in order to prevent re-injury. []? Progressing: []? Met: []? Not Met: []? Adjusted  2. Patient will have a decrease in pain by 40% to facilitate improvement in movement, function, and ADLs as indicated by Functional Deficits. []? Progressing: []? Met: []? Not Met: []? Adjusted     Long Term Goals: To be achieved in: at discharge  1. Disability index score of 20% or less for the LEFS to assist with reaching prior level of function. []? Progressing: []? Met: []? Not Met: []? Adjusted  2. Patient will demonstrate increased AROM to +8-125 to allow for proper joint functioning as indicated by patients Functional Deficits. []? Progressing: []? Met: []? Not Met: []? Adjusted  3. Patient will demonstrate an increase in Strength to 5/5 to allow for proper functional mobility as indicated by patients Functional Deficits. []? Progressing: []? Met: []? Not Met: []? Adjusted  4. Patient will return to ambulating in community without AD and demonstrate good gait pattern without increased symptoms or restriction. []? Progressing: []? Met: []? Not Met: []? Adjusted  5. Patient will return to fitness center workout routine without increased symptoms or restrictions. []? Progressing: []? Met: []? Not Met: []? Adjusted         ASSESSMENT:  Patient with good tolerance to progression of proprioceptive exercises. Quad contraction is decreased but shows good recruitment. Eccentric quad control requires cuing. Knee flexion PROM has been achieved, extension ROM needs improvement. Core recruitment and endurance is poor which is limiting prolonged standing and ambulation.     Treatment/Activity Tolerance:  [x] Patient tolerated treatment well [] Patient limited by fatique  [] Patient limited by pain  [] Patient limited by other medical complications  [] Other:     Overall Progression Towards Functional goals/ Treatment Progress Update:  [] Patient is progressing as expected towards functional goals listed. [] Progression is slowed due to complexities/Impairments listed. [] Progression has been slowed due to co-morbidities. [x] Plan just implemented, too soon to assess goals progression <30days   [] Goals require adjustment due to lack of progress  [] Patient is not progressing as expected and requires additional follow up with physician  [] Other    Prognosis for POC: [x] Good [] Fair  [] Poor    Patient requires continued skilled intervention: [x] Yes  [] No        PLAN: Do exercises in fitness center to help with transition to HEP. [x] Continue per plan of care [] Alter current plan (see comments)  [] Plan of care initiated [] Hold pending MD visit [] Discharge    Electronically signed by: Bertha Dangelo PT , OMT-C,  [id number]      Note: If patient does not return for scheduled/recommended follow up visits, this note will serve as a discharge from care along with the most recent update on progress.

## 2020-10-27 ENCOUNTER — HOSPITAL ENCOUNTER (OUTPATIENT)
Dept: PHYSICAL THERAPY | Age: 55
Setting detail: THERAPIES SERIES
Discharge: HOME | End: 2020-10-27
Payer: COMMERCIAL

## 2020-10-27 PROCEDURE — 97112 NEUROMUSCULAR REEDUCATION: CPT

## 2020-10-27 PROCEDURE — 97110 THERAPEUTIC EXERCISES: CPT

## 2020-10-27 NOTE — FLOWSHEET NOTE
No longer using the cane  10/22: no complaints of pain just discomfort. Wearing ACE wrap as needed for swelling. Notes his back pain is also improving. 10/27: Just a little stiffness in the morning. Patient reports 80% improvement with pain overall. Notes its more discomfort than pain in the knee. Patient reports he has returned to regular activities without issues. OBJECTIVE:   Observation:   · Palpation: mild TTP around L ankle  · Functional Mobility/Transfers: independent  · Bandages/Dressings/Incisions: arrived to PT with ACE wrap from foot to above knee for edema management.   Incision healed with no signs of infection  · Gait:10/20 -  Normal knee flexion in swing, good extension at heel strike     ROM:  Date           Knee Flexion       Knee Extension    Active Passive Active Passive    Eval 10/6 120 120 Lacking 15 Lacking 13            Strength:  Date Hip flexion Hip abduction Quad Hamstring Ankle DF Ankle PF   Eval 10/6 4/5 5/5 4-/5 4/5 4+/5 4-/5                RESTRICTIONS/PRECAUTIONS: none    Exercises/Interventions:     Therapeutic Ex (42867)   Min:35 Reps/Resistance Notes/CUES        Nu step  Seat  9 no UE L2  x 7 mins    HS step stretch 3x20 sec     Knee flexion step stretch 3x20 sec    gastroc incline 3x20 sec     Mini squats  10x    Stand SL HR 10x L     Step Up fwd 8\" 10x L Cuing for good quad and glute activation needed    Step downs 6\" 10x L Cuing needed for controlled eccentric descent   Tband TKE Blue 20x L Tactile cuing needed to avoid weight shifting and isolate movement to just the knee joint   In [facility]:      Leg Press 70# 2x10 B      Seated FAQ 10# 2x10 L    Seated HS curls 15# 2x10 L         Seated hip abduction                    adduction 60# 2x10 B  60# 2x10 B    Free motion hip ext 10# 2x10 L         Long sit knee ext stretch 3x20 sec L    SLR flexion 2# 2x10 L    SAQ 2# 2x10 L    Prone opp UE/LE lift 5x L/R         Manual Intervention (81934)  Min: 5     Knee PROM hip, and core control with self care, mobility, lifting, ambulation.  [] (13494) Provided verbal/tactile cueing for activities related to improving balance, coordination, kinesthetic sense, posture, motor skill, proprioception  to assist with LE, proximal hip, and core control in self care, mobility, lifting, ambulation and eccentric single leg control. NMR and Therapeutic Activities:    [x] (81452 or 72871) Provided verbal/tactile cueing for activities related to improving balance, coordination, kinesthetic sense, posture, motor skill, proprioception and motor activation to allow for proper function of core, proximal hip and LE with self care and ADLs and functional mobility.   [] (64626) Gait Re-education- Provided training and instruction to the patient for proper LE, core and proximal hip recruitment and positioning and eccentric body weight control with ambulation re-education including up and down stairs     Home Exercise Program:    [x] (71258) Reviewed/Progressed HEP activities related to strengthening, flexibility, endurance, ROM of core, proximal hip and LE for functional self-care, mobility, lifting and ambulation/stair navigation   [] (19693)Reviewed/Progressed HEP activities related to improving balance, coordination, kinesthetic sense, posture, motor skill, proprioception of core, proximal hip and LE for self care, mobility, lifting, and ambulation/stair navigation      Manual Treatments:  PROM / STM / Oscillations-Mobs:  G-I, II, III, IV (PA's, Inf., Post.)  [] (87302) Provided manual therapy to mobilize LE, proximal hip and/or LS spine soft tissue/joints for the purpose of modulating pain, promoting relaxation,  increasing ROM, reducing/eliminating soft tissue swelling/inflammation/restriction, improving soft tissue extensibility and allowing for proper ROM for normal function with self care, mobility, lifting and ambulation.        Charges:  Timed Code Treatment Minutes: 65   Total Treatment Minutes: 65      [] EVAL (LOW) 70769 (typically 20 minutes face-to-face)  [] EVAL (MOD) 07112 (typically 30 minutes face-to-face)  [] EVAL (HIGH) 32598 (typically 45 minutes face-to-face)  [] RE-EVAL     [x] HF(21701) x 2    [] Dry needle 1 or 2 Muscles (68678)  [x] NMR (75018) x 2    [] Dry needle 3+ Muscles (61806)  [] Manual (26987) x     [] Ultrasound (22420) x  [] TA (45706) x     [] Mech Traction (50199)  [] ES(attended) (97928)     [] ES (un) (89312):   [] Vasopump (92790)  [] Ionto (02661)   [] Other:    GOALS:  Patient stated goal: \"Get back to normal activities\"  []? Progressing: [x]? Met: []? Not Met: []? Adjusted     Therapist goals for Patient:   Short Term Goals: To be achieved in: 2 weeks  1. Independent in HEP and progression per patient tolerance, in order to prevent re-injury. []? Progressing: [x]? Met: []? Not Met: []? Adjusted  2. Patient will have a decrease in pain by 40% to facilitate improvement in movement, function, and ADLs as indicated by Functional Deficits. []? Progressing: [x]? Met: []? Not Met: []? Adjusted     Long Term Goals: To be achieved in: at discharge  1. Disability index score of 20% or less for the LEFS to assist with reaching prior level of function. []? Progressing: []? Met: []? Not Met: []? Adjusted  2. Patient will demonstrate increased AROM to +8-125 to allow for proper joint functioning as indicated by patients Functional Deficits. []? Progressing: []? Met: []? Not Met: []? Adjusted  3. Patient will demonstrate an increase in Strength to 5/5 to allow for proper functional mobility as indicated by patients Functional Deficits. []? Progressing: []? Met: []? Not Met: []? Adjusted  4. Patient will return to ambulating in community without AD and demonstrate good gait pattern without increased symptoms or restriction. []? Progressing: [x]? Met: []? Not Met: []? Adjusted  5.  Patient will return to fitness center workout routine without increased symptoms or restrictions. []? Progressing: [x]? Met: []? Not Met: []? Adjusted         ASSESSMENT:  Patient with good tolerance to progression of proprioceptive exercises. Quad contraction is decreased but shows good recruitment. Eccentric quad control requires cuing. Knee flexion PROM has been achieved, extension ROM needs improvement. Core recruitment and endurance is poor which is limiting prolonged standing and ambulation. Treatment/Activity Tolerance:  [x] Patient tolerated treatment well [] Patient limited by fatique  [] Patient limited by pain  [] Patient limited by other medical complications  [] Other:     Overall Progression Towards Functional goals/ Treatment Progress Update:  [] Patient is progressing as expected towards functional goals listed. [] Progression is slowed due to complexities/Impairments listed. [] Progression has been slowed due to co-morbidities. [x] Plan just implemented, too soon to assess goals progression <30days   [] Goals require adjustment due to lack of progress  [] Patient is not progressing as expected and requires additional follow up with physician  [] Other    Prognosis for POC: [x] Good [] Fair  [] Poor    Patient requires continued skilled intervention: [x] Yes  [] No        PLAN: continue x 1 more PT visit then possible d/c with HEP  [x] Continue per plan of care [] Alter current plan (see comments)  [] Plan of care initiated [] Hold pending MD visit [] Discharge    Electronically signed by: Tea Mccarty PT , OMT-C,  [id number]      Note: If patient does not return for scheduled/recommended follow up visits, this note will serve as a discharge from care along with the most recent update on progress.

## 2020-10-29 ENCOUNTER — HOSPITAL ENCOUNTER (OUTPATIENT)
Dept: PHYSICAL THERAPY | Age: 55
Setting detail: THERAPIES SERIES
Discharge: HOME | End: 2020-10-29
Payer: COMMERCIAL

## 2020-10-29 PROCEDURE — 97112 NEUROMUSCULAR REEDUCATION: CPT

## 2020-10-29 PROCEDURE — 97110 THERAPEUTIC EXERCISES: CPT

## 2020-10-29 NOTE — DISCHARGE SUMMARY
[place] [facility], [facility]  [address] [facility], [facility]  Phone: [phone]   Fax:     [phone]      Physical Therapy Discharge Summary    Dear Dr. Abdiaziz Kelley,    We had the pleasure of treating the following patient for physical therapy services at [address]. A summary of our findings can be found in the discharge summary below. This includes our final assessment. If you have any questions or concerns regarding these findings, please do not hesitate to contact me at the office phone number checked above. Thank you for the referral.       Physician Signature:________________________________Date:__________________  By signing above, therapists plan is approved by physician          Patient: Mario Alberto Mendoza   : 1965   MRN: [id number]  Referring Physician:        Evaluation Date: 10/29/2020        Medical Diagnosis Information:  · Diagnosis: L TKR    · Treatment Diagnosis: Decreased mobility, strength    Insurance/Certification information:   [facility]    Date Range of Treatment: 10/6/20 to 10/29/20  Total visits: 8    Functional Outcomes Measure: at discharge  Test: LEFS  Score:66    SUBJECTIVE:Just a little stiffness in the morning. Patient reports 80% improvement with pain overall. Notes its more discomfort than pain in the knee. Patient reports he has returned to regular activities without issues.       Pain Scale: 0/10    Type: []Constant   [x]Intermitment  []Radiating [x]Localized  []other:     Functional Limitations: []Sitting [x]Standing [x]Walking    []Squatting []Stairs           []ADL's  []Driving [x]Sports/Recreation  []Other:      · OBJECTIVE:Observation: Updated 10/29/20:   § Palpation: no TTP noted  § Functional Mobility/Transfers: independent  § Bandages/Dressings/Incisions: Incision healed with no signs of infection  § Gait: Patient ambulates WBAT without AD demonstrating normal knee flexion in swing, good extension at heel strike     ROM:  Date                         Knee Flexion          Knee Extension     Active Passive Active Passive    Eval 10/6 120 120 Lacking 15 Lacking 13   10/29/20 128 130 Lacking 5 Lacking 2      Strength:  Date Hip flexion Hip abduction Quad Hamstring Ankle DF Ankle PF   Eval 10/6 4/5 5/5 4-/5 4/5 4+/5 4-/5   10/29/20 5/5 5/5 4+/5 5/5 5/5 5/5       ASSESSMENT:Patient has progressed well with PT at this time and prepared to continue on own with HEP. Patient demonstrates good ROM and progressing quad strength. Quad strength and endurance will continue to improve with HEP compliance. Response to Treatment:   [x]Patient met all goals and has responded well to treatment and will continue HEP   [x]Patient should continue to improve in reasonable time if they continue HEP   []Patient has plateaued and is no longer responding to skilled PT intervention    []Patient is getting worse and would benefit from return to referring MD   []Patient unable to adhere to initial POC      GOALS:    Patient stated goal: \"Get back to normal activities\"  []? ? Progressing: [x]? ? Met: []?? Not Met: []?? Adjusted     Therapist goals for Patient:   Short Term Goals: To be achieved in: 2 weeks  1. Independent in HEP and progression per patient tolerance, in order to prevent re-injury. []?? Progressing: [x]? ? Met: []?? Not Met: []?? Adjusted  2. Patient will have a decrease in pain by 40% to facilitate improvement in movement, function, and ADLs as indicated by Functional Deficits. []?? Progressing: [x]? ? Met: []?? Not Met: []?? Adjusted     Long Term Goals: To be achieved in: at discharge  1. Disability index score of 20% or less for the LEFS to assist with reaching prior level of function.   []?? Progressing: [x]? ? Met: []?? Not Met: []?? Adjusted  2.  Patient will demonstrate increased AROM to +8-125 to allow for proper joint functioning as indicated by patients Functional Deficits. []?? Progressing: [x]? ? Met: []?? Not Met: []?? Adjusted  3. Patient will demonstrate an increase in Strength to 5/5 to allow for proper functional mobility as indicated by patients Functional Deficits. [x]? ? Progressing: []?? Met: []?? Not Met: []?? Adjusted  4. Patient will return to ambulating in community without AD and demonstrate good gait pattern without increased symptoms or restriction.   []?? Progressing: [x]? ? Met: []?? Not Met: []?? Adjusted  5. Patient will return to fitness center workout routine without increased symptoms or restrictions.   []?? Progressing: [x]? ? Met: []?? Not Met: []?? Adjusted          PLAN: D/C with HEP. Patient will need to maintain their HEP in order to prevent re-occurrence.       Reason for Discharge:  [x] Goals Met   [] Patient did not return after initial evaluation   [] Progress Plateaued  [] Missed _____ scheduled appointments   [] No insurance coverage [] Patient terminated therapy   [] MD discharged from PT [] Financial Limitations  [] Other:      Electronically signed by:  Clarissa Aden, [address] [facility] , [facility],  [id number]

## 2020-10-29 NOTE — FLOWSHEET NOTE
[place] [facility], [facility]  [address] [facility] [place], [facility]  Phone: [phone]   Fax:     [phone]      Physical Therapy Treatment Note/ Progress Report:     Date:  10/29/2020    Patient Name:  Marko Nieto    :  1965  MRN: [id number]    Pertinent Medical History: HTN, R TKR    Medical/Treatment Diagnosis Information:  · Diagnosis: L TKR  · Treatment Diagnosis: Decreased mobility, strength    Insurance/Certification information:  PT Insurance Information: KristelCesario [employer] Almaraz[phone] [id number] - has used 4 visits)  Physician Information:  Referring Practitioner: Dr. Stuart Lr of care signed (Y/N): sent for cosign 10/6 (received)    Date of Patient follow up with Physician: 10/6/20     Progress Report: []  Yes  [x]  No     Date Range for reporting period:  Beginning:  10/6/20  Ending:      Progress report due (10 Rx/or 30 days whichever is less): 68     Recertification due (POC duration/ or 90 days whichever is less):20     Visit # POC/Insurance Allowable Auth Needed   8 12 []Yes   [x]No     Latex Allergy:  [x]NO      []YES  Preferred Language for Healthcare:   [x]English       []other:  Functional Scale: LEFS =   53 Date assessed: at eval    Pain level:  0/10     History of Injury:Patient underwent L TKR on 20. Patient was discharged to home where he received home PT for a few weeks. Patient has been using straight cane for ambulation for the past week. This week he has been using the cane as needed. Currently doing stairs one at a time. SUBJECTIVE:  10/8: just some discomfort after last session from the stretching. 10/13: just some discomfort in the knee. Did well after last PT session. 10/15: able to walk dogs last night about a mile. Had to stop a few times due to LBP. Notes some mild increased swelling afterwards in the knee. Made sure he elevated and iced when he got home.    10/20 - No longer using the cane  10/22: no complaints of pain just discomfort. Wearing ACE wrap as needed for swelling. Notes his back pain is also improving. 10/27: Just a little stiffness in the morning. Patient reports 80% improvement with pain overall. Notes its more discomfort than pain in the knee. Patient reports he has returned to regular activities without issues. 10/29: patient states no questions about HEP or fitness center routine.      OBJECTIVE:   Observation: Updated 10/29/20:   · Palpation: no TTP noted  · Functional Mobility/Transfers: independent  · Bandages/Dressings/Incisions: Incision healed with no signs of infection  · Gait: Patient ambulates WBAT without AD demonstrating normal knee flexion in swing, good extension at heel strike     ROM:  Date           Knee Flexion       Knee Extension    Active Passive Active Passive    Eval 10/6 120 120 Lacking 15 Lacking 13   10/29/20 128 130 Lacking 5 Lacking 2     Strength:  Date Hip flexion Hip abduction Quad Hamstring Ankle DF Ankle PF   Eval 10/6 4/5 5/5 4-/5 4/5 4+/5 4-/5   10/29/20 5/5 5/5 4+/5 5/5 5/5 5/5       RESTRICTIONS/PRECAUTIONS: none    Exercises/Interventions:     Therapeutic Ex (86133)   Min:35 Reps/Resistance Notes/CUES        Nu step  Seat  9 no UE L2  x 7 mins    HS step stretch 3x20 sec     Knee flexion step stretch 3x20 sec    gastroc incline 3x20 sec     Mini squats  10x    Stand SL HR 10x L     Step Up fwd 8\" 10x L Cuing for good quad and glute activation needed    Step downs 6\" 10x L Cuing needed for controlled eccentric descent   Tband TKE Blue 20x L Tactile cuing needed to avoid weight shifting and isolate movement to just the knee joint        Leg Press 70# 2x10 B  50# 2x10 L    Seated FAQ 22# 2x10 L    Seated HS curls 22# 2x10 L    MHE flexion           abduction 25# 1x10 L/R  25# 1x10 L/R    Seated hip abduction                    adduction    Free motion hip ext         Long sit knee ext stretch 3x20 sec L    SLR flexion 2# 2x10 L    SAQ 2# 2x10 L    Prone opp UE/LE lift 5x L/R         Manual Intervention (25264)  Min: 5     Knee PROM flex/ext  x5 mins supine   Patella Mobs Grade III 5x each               NMR re-education (54021)  Min:25  CUES NEEDED   Tandem stance x30 sec L/R    SLS x30 sec L     Bosu: balance             squats x30 sec   10x With slight squat   Prone over ball opp UE/LE 5x 3 sec hold        Pulley multifidus walkout  20# 5x each R/L Cuing for TA activation prior to walking out   [place] estim to [employer] with SAQ 35 bps   10 sec on / 10 sec off  x10 mins         Therapeutic Activity (38910)  Min:3     [employer] board f/b, s/s x1 min each          Modalities  Min: 10     CP with [place] estim above x10 mins L knee      Other Therapeutic Activities: Pt was educated on PT POC, Diagnosis, Prognosis, pathomechanics as well as frequency and duration of scheduling future physical therapy appointments. Time was also taken on this day to answer all patient questions and participation in PT. Reviewed appointment policy in detail with patient and patient verbalized understanding. 10/15: patient questioned playing in a golf outing next week. Patient advised to participate in short chipping and putting only at this time due to lack of quad strength. Patient advised to take some practice swings prior to outing. Patient verbalized understanding. 10/29: Re-eval measurements taken for MD STEVENS. Discussed remaining deficits and continuation with HEP at this time. Patient agreeable. Home Exercise Program: Patient was instructed in the following for HEP:  Long sitting knee ext stretch. Patient verbalized/demonstrated understanding and was issued written handout. 10/27: Updated pt's HEP to include the above exercises. Patient issued blue tband for use at home. Patient also taken to fitness center and educated on appropriate equipment to continue with after d/c.   Patient verbalized/demonstrated understanding and was issued written handout for home and fitness center routines. Therapeutic Exercise and NMR EXR  [x] (79909) Provided verbal/tactile cueing for activities related to strengthening, flexibility, endurance, ROM for improvements in LE, proximal hip, and core control with self care, mobility, lifting, ambulation.  [] (57328) Provided verbal/tactile cueing for activities related to improving balance, coordination, kinesthetic sense, posture, motor skill, proprioception  to assist with LE, proximal hip, and core control in self care, mobility, lifting, ambulation and eccentric single leg control.      NMR and Therapeutic Activities:    [x] (82086 or 68570) Provided verbal/tactile cueing for activities related to improving balance, coordination, kinesthetic sense, posture, motor skill, proprioception and motor activation to allow for proper function of core, proximal hip and LE with self care and ADLs and functional mobility.   [] (25075) Gait Re-education- Provided training and instruction to the patient for proper LE, core and proximal hip recruitment and positioning and eccentric body weight control with ambulation re-education including up and down stairs     Home Exercise Program:    [x] (47979) Reviewed/Progressed HEP activities related to strengthening, flexibility, endurance, ROM of core, proximal hip and LE for functional self-care, mobility, lifting and ambulation/stair navigation   [] (28623)Reviewed/Progressed HEP activities related to improving balance, coordination, kinesthetic sense, posture, motor skill, proprioception of core, proximal hip and LE for self care, mobility, lifting, and ambulation/stair navigation      Manual Treatments:  PROM / STM / Oscillations-Mobs:  G-I, II, III, IV (PA's, Inf., Post.)  [] (24359) Provided manual therapy to mobilize LE, proximal hip and/or LS spine soft tissue/joints for the purpose of modulating pain, promoting relaxation,  increasing ROM, reducing/eliminating soft tissue swelling/inflammation/restriction, improving soft tissue extensibility and allowing for proper ROM for normal function with self care, mobility, lifting and ambulation. Charges:  Timed Code Treatment Minutes: 65   Total Treatment Minutes: 65      [] EVAL (LOW) 80581 (typically 20 minutes face-to-face)  [] EVAL (MOD) 73977 (typically 30 minutes face-to-face)  [] EVAL (HIGH) 00985 (typically 45 minutes face-to-face)  [] RE-EVAL     [x] NI(18746) x 2    [] Dry needle 1 or 2 Muscles (97088)  [x] NMR (92794) x 2    [] Dry needle 3+ Muscles (58959)  [] Manual (18929) x     [] Ultrasound (88769) x  [] TA (29508) x     [] Mech Traction (66746)  [] ES(attended) (00332)     [] ES (un) (54669):   [] Vasopump (67923)  [] Ionto (57857)   [] Other:    GOALS:  Patient stated goal: \"Get back to normal activities\"  []? Progressing: [x]? Met: []? Not Met: []? Adjusted     Therapist goals for Patient:   Short Term Goals: To be achieved in: 2 weeks  1. Independent in HEP and progression per patient tolerance, in order to prevent re-injury. []? Progressing: [x]? Met: []? Not Met: []? Adjusted  2. Patient will have a decrease in pain by 40% to facilitate improvement in movement, function, and ADLs as indicated by Functional Deficits. []? Progressing: [x]? Met: []? Not Met: []? Adjusted     Long Term Goals: To be achieved in: at discharge  1. Disability index score of 20% or less for the LEFS to assist with reaching prior level of function. []? Progressing: []? Met: []? Not Met: []? Adjusted  2. Patient will demonstrate increased AROM to +8-125 to allow for proper joint functioning as indicated by patients Functional Deficits. []? Progressing: [x]? Met: []? Not Met: []? Adjusted  3. Patient will demonstrate an increase in Strength to 5/5 to allow for proper functional mobility as indicated by patients Functional Deficits. [x]? Progressing: []? Met: []? Not Met: []? Adjusted  4.  Patient will return to ambulating in community without AD and demonstrate good gait pattern without increased symptoms or restriction. []? Progressing: [x]? Met: []? Not Met: []? Adjusted  5. Patient will return to fitness center workout routine without increased symptoms or restrictions. []? Progressing: [x]? Met: []? Not Met: []? Adjusted         ASSESSMENT: Patient has progressed well with PT at this time and prepared to continue on own with HEP. Patient demonstrates good ROM and progressing quad strength. Quad strength and endurance will continue to improve with HEP compliance. Treatment/Activity Tolerance:  [x] Patient tolerated treatment well [] Patient limited by fatique  [] Patient limited by pain  [] Patient limited by other medical complications  [] Other:     Overall Progression Towards Functional goals/ Treatment Progress Update:  [x] Patient is progressing as expected towards functional goals listed. [] Progression is slowed due to complexities/Impairments listed. [] Progression has been slowed due to co-morbidities. [] Plan just implemented, too soon to assess goals progression <30days   [] Goals require adjustment due to lack of progress  [] Patient is not progressing as expected and requires additional follow up with physician  [] Other    Prognosis for POC: [x] Good [] Fair  [] Poor    Patient requires continued skilled intervention: [] Yes  [x] No        PLAN:   [] Continue per plan of care [] Alter current plan (see comments)  [] Plan of care initiated [] Hold pending MD visit [x] Discharge    Electronically signed by: Elyssa Acosta PT , OMT-C,  [id number]      Note: If patient does not return for scheduled/recommended follow up visits, this note will serve as a discharge from care along with the most recent update on progress.

## 2020-11-25 RX ORDER — TIZANIDINE 4 MG/1
TABLET ORAL
Qty: 90 TABLET | Refills: 1 | Status: SHIPPED | OUTPATIENT
Start: 2020-11-25 | End: 2021-01-20

## 2020-12-02 ENCOUNTER — OFFICE VISIT (OUTPATIENT)
Dept: ORTHOPEDIC SURGERY | Age: 55
End: 2020-12-02
Payer: COMMERCIAL

## 2020-12-02 VITALS
HEIGHT: 71 IN | BODY MASS INDEX: 39.2 KG/M2 | WEIGHT: 280 LBS | TEMPERATURE: 98.1 F | DIASTOLIC BLOOD PRESSURE: 85 MMHG | SYSTOLIC BLOOD PRESSURE: 134 MMHG | HEART RATE: 94 BPM

## 2020-12-02 PROCEDURE — 99214 OFFICE O/P EST MOD 30 MIN: CPT | Performed by: PHYSICIAN ASSISTANT

## 2020-12-02 RX ORDER — CALCITONIN SALMON 200 [IU]/.09ML
1 SPRAY, METERED NASAL DAILY
Qty: 1 BOTTLE | Refills: 2 | Status: SHIPPED | OUTPATIENT
Start: 2020-12-02 | End: 2021-12-02

## 2020-12-02 NOTE — PROGRESS NOTES
Patient Name: Elizabeth Lea MRN: <[id number]>   Age: 63 y.o. YOB: 1965   Sex: male         CHIEF COMPLAINT   Left total Knee postoperative visit    HISTORY OF PRESENT ILLNESS   Patient returns for their fourth postoperative evaluation status post left total knee replacement. The patient is doing very well in regards to the left knee. But noting increasing pain in regards to the right knee he notes he banged the front portion of his knee into a desk or some sort of furniture cannot remember exactly what it was about 2 weeks ago noted increasing irritating pain while he was trying to lay mary jane and doing some house hold activities. After he finished those activities he has been fairly calm in regards to his activity level he has noticed a decrease in his overall pain rates his pain is a 3-4 out of 10 on the right knee. He denies any further fall trauma or injury denies numbness burning tingling radiating pains in the leg describes the pain in the anterior proximal aspect of the kneecap. They have small complaints of pain. There is small swelling about the knee. The patient has been doing physical therapy at home. They deny any calf swelling or numbness or tingling down the leg       Assessment      Review of Systems   Constitutional: Negative for chills and fever. HENT: Negative for nosebleeds. Eyes: Negative for double vision. Cardiovascular: Negative for chest pain. Gastrointestinal: Negative for abdominal pain. Musculoskeletal: Positive for joint pain and myalgias. Skin: Negative for rash. Neurological: Negative for seizures. Psychiatric/Behavioral: Negative for hallucinations. PHYSICAL EXAM     Vital Signs:   Vitals:    12/02/20 0929   BP: 134/85   Pulse: 94   Temp: 98.1 °F (36.7 °C)       Examination of the knee shows a well-healed midline incision. There is small swelling. There is no drainage. Range of motion is 0 to 120.   On bilateral knees    The patient is neurovascularly intact. NEUROLOGICALLY: There is no evidence for sensory or motor deficits in the extremity. Coordination appears full with no spacticity or rigidity. Reflexes appear to be symmetric. Distal circulation intact. No signs of RSD. Calf nontender. Negative delma's,  no signs of dvt    Hip exam shows full ROM with no focal pain or Instability. Leg lengths are normal. There is no Trendelenburg Gait. RADIOLOGY   Xray   Have reviewed the xrays above from 12/2/2020  3 views of the left knee AP, lateral and sunrise views and my impression is: Well-placed total knee prosthesis no evidence of loosening or fracture. Three-view x-ray of the right knee AP, lateral and sunrise views were performed and reviewed today and my impression is: Well-placed total knee prosthesis no evidence of loosening. There is notation of a new transverse fracture of the proximal proximal aspect of the patella with minimal displacement noted. No apparent effects to the patellar button. IMPRESSION   3 months(s) status post left total knee replacement  New onset patellar fracture on the right knee transverse minimally displaced    PLAN   The patient is doing well 3 months(s) status post left total knee replacement. Patient has a new transverse minimally displaced patellar fracture on the right knee to the proximal aspect of the patella. No apparent effects to the total knee prosthesis at the present time.  -Advised patient to utilize brace when walking or standing to help reduce increased flexion and irritation to the patella and possible displacement of the fracture fragment.  -Advised for patient to follow-up in 1 week for repeat evaluation and repeat x-rays to ensure fragment stays stable. -Prescribed Miacalcin nasal spray 1 actuation per nostril daily to utilize to help with bone healing.   Significant improvements overall in the incision no drainage since initial visit        The orders below, if any, were placed during this visit:          ICD-10-CM    1. Status post total knee replacement not using cement, left  Z96.652 XR KNEE LEFT (3 VIEWS)   2. Status post total knee replacement, right  Z96.651 XR KNEE RIGHT (3 VIEWS)   3. Chronic pain of left knee  M25.562 XR KNEE LEFT (3 VIEWS)    G89.29    4. Chronic pain of right knee  M25.561 XR KNEE RIGHT (3 VIEWS)    G89.29    5.  Closed nondisplaced transverse fracture of right patella, initial encounter  S82.034A          ISHMAEL Damon

## 2020-12-09 ENCOUNTER — OFFICE VISIT (OUTPATIENT)
Dept: ORTHOPEDIC SURGERY | Age: 55
End: 2020-12-09
Payer: COMMERCIAL

## 2020-12-09 VITALS
SYSTOLIC BLOOD PRESSURE: 132 MMHG | TEMPERATURE: 97.9 F | BODY MASS INDEX: 39.2 KG/M2 | WEIGHT: 280 LBS | HEIGHT: 71 IN | DIASTOLIC BLOOD PRESSURE: 91 MMHG | HEART RATE: 89 BPM

## 2020-12-09 PROBLEM — S82.034A CLOSED NONDISPLACED TRANSVERSE FRACTURE OF RIGHT PATELLA: Status: ACTIVE | Noted: 2020-12-09

## 2020-12-09 PROCEDURE — 99213 OFFICE O/P EST LOW 20 MIN: CPT | Performed by: PHYSICIAN ASSISTANT

## 2020-12-09 PROCEDURE — 99024 POSTOP FOLLOW-UP VISIT: CPT | Performed by: PHYSICIAN ASSISTANT

## 2020-12-09 RX ORDER — VITAMIN A, VITAMIN C, VITAMIN D-3, VITAMIN E, VITAMIN B-1, VITAMIN B-2, NIACIN, VITAMIN B-6, CALCIUM, IRON, ZINC, COPPER 4000; 120; 400; 22; 1.84; 3; 20; 10; 1; 12; 200; 27; 25; 2 [IU]/1; MG/1; [IU]/1; MG/1; MG/1; MG/1; MG/1; MG/1; MG/1; UG/1; MG/1; MG/1; MG/1; MG/1
1 TABLET ORAL 2 TIMES DAILY
Qty: 60 TABLET | Refills: 2 | Status: SHIPPED | OUTPATIENT
Start: 2020-12-09 | End: 2021-05-11 | Stop reason: ALTCHOICE

## 2020-12-09 NOTE — PROGRESS NOTES
Patient Name: Macey Garcia MRN: <[id number]>   Age: 63 y.o. YOB: 1965   Sex: male         CHIEF COMPLAINT   Left total Knee postoperative visit    HISTORY OF PRESENT ILLNESS   Patient returns for their fifth postoperative evaluation status post left total knee replacement. The patient is doing very well in regards to the left knee. Notes he has been utilizing the progression as well as trying to limit overall activity across the knee joint due to the recent patellar fracture. Patient notes pain is improving rates it as a 2 out of 10. And he is still good function and improving function in regards to his quadriceps and overall muscle strength. He denies loss giving out or buckling of the knee. And less overall swelling. He has been icing and utilizing anti-inflammatories as well to help control pain. Denies any further fall trauma or injury  They have small complaints of pain. There is small swelling about the knee. The patient has been doing physical therapy at home. They deny any calf swelling or numbness or tingling down the leg       Assessment      Review of Systems   Constitutional: Negative for chills and fever. HENT: Negative for nosebleeds. Eyes: Negative for double vision. Cardiovascular: Negative for chest pain. Gastrointestinal: Negative for abdominal pain. Musculoskeletal: Positive for joint pain and myalgias. Skin: Negative for rash. Neurological: Negative for seizures. Psychiatric/Behavioral: Negative for hallucinations. PHYSICAL EXAM     Vital Signs:   Vitals:    12/09/20 1157   BP: (!) 132/91   Pulse: 89   Temp:        Examination of the knee shows a well-healed midline incision. There is small swelling. There is no drainage. Range of motion is 0 to 120. On bilateral knees    The patient is neurovascularly intact. NEUROLOGICALLY: There is no evidence for sensory or motor deficits in the extremity.  Coordination appears full with the incision no drainage since initial visit        The orders below, if any, were placed during this visit:          ICD-10-CM    1. Status post total knee replacement, right  Z96.651    2.  Closed nondisplaced transverse fracture of right patella, initial encounter  S82.034A XR KNEE RIGHT (3 VIEWS)         ISHMAEL Casey

## 2020-12-29 ENCOUNTER — OFFICE VISIT (OUTPATIENT)
Dept: ORTHOPEDIC SURGERY | Age: 55
End: 2020-12-29
Payer: COMMERCIAL

## 2020-12-29 VITALS
HEART RATE: 97 BPM | TEMPERATURE: 97.9 F | HEIGHT: 71 IN | DIASTOLIC BLOOD PRESSURE: 89 MMHG | WEIGHT: 280 LBS | BODY MASS INDEX: 39.2 KG/M2 | SYSTOLIC BLOOD PRESSURE: 131 MMHG

## 2020-12-29 PROCEDURE — 99213 OFFICE O/P EST LOW 20 MIN: CPT | Performed by: PHYSICIAN ASSISTANT

## 2020-12-29 RX ORDER — ASPIRIN 325 MG
TABLET, DELAYED RELEASE (ENTERIC COATED) ORAL
Qty: 30 TABLET | Refills: 1 | Status: SHIPPED | OUTPATIENT
Start: 2020-12-29 | End: 2021-05-11 | Stop reason: ALTCHOICE

## 2020-12-29 NOTE — PROGRESS NOTES
Patient Name: Moreno Peterson MRN: <[id number]>   Age: 63 y.o. YOB: 1965   Sex: male         CHIEF COMPLAINT   Left total Knee postoperative visit    HISTORY OF PRESENT ILLNESS   Patient returns for their sixth postoperative evaluation status post left total knee replacement. The patient is doing very well in regards to the left knee. Notes he has been utilizing the progression as well as trying to limit overall activity across the knee joint due to the recent patellar fracture. Patient notes pain is improving rates it as a 1 out of 10. And he is still good function and improving function in regards to his quadriceps and overall muscle strength. He denies loss of control or function or strength as well as giving out or buckling of the knee. And less overall swelling. He has been icing and utilizing anti-inflammatories as well to help control pain. Denies any further fall trauma or injury  They have small complaints of pain. There is small swelling about the knee. The patient has been doing physical therapy at home. They deny any calf swelling or numbness or tingling down the leg       Assessment      Review of Systems   Constitutional: Negative for chills and fever. HENT: Negative for nosebleeds. Eyes: Negative for double vision. Cardiovascular: Negative for chest pain. Gastrointestinal: Negative for abdominal pain. Musculoskeletal: Positive for joint pain and myalgias. Skin: Negative for rash. Neurological: Negative for seizures. Psychiatric/Behavioral: Negative for hallucinations. PHYSICAL EXAM     Vital Signs:   Vitals:    12/29/20 1001   BP: 131/89   Pulse: 97   Temp: 97.9 °F (36.6 °C)       Examination of the knee shows a well-healed midline incision. There is small swelling. There is no drainage. Range of motion is 0 to 120. On bilateral knees    The patient is neurovascularly intact. NEUROLOGICALLY: There is no evidence for sensory or motor deficits in the extremity. Coordination appears full with no spacticity or rigidity. Reflexes appear to be symmetric. Only mild tenderness noted on the proximal aspect of the patella. Great strength with flexion and extension of the knee. Distal circulation intact. No signs of RSD. Calf nontender. Negative delma's,  no signs of dvt    Hip exam shows full ROM with no focal pain or Instability. Leg lengths are normal. There is no Trendelenburg Gait. RADIOLOGY   Xray   Have reviewed the xrays above from 12/2/2020  3 views of the left knee AP, lateral and sunrise views and my impression is: Well-placed total knee prosthesis no evidence of loosening or fracture. X-ray from 12/29/2020  Three-view x-ray of the right knee AP, lateral and sunrise views were performed and reviewed today and my impression is: Well-placed total knee prosthesis no evidence of loosening. There is notation of a  transverse fracture of the proximal proximal aspect of the patella with minimal displacement noted since previous x-rays also noting bone growth across the Gap. No apparent effects to the patellar button. IMPRESSION   3 months(s) status post left total knee replacement  7 weeks since patellar fracture on the right knee transverse minimally displaced    PLAN   The patient is doing well 3 months(s) status post left total knee replacement. Patient is 3 weeks out from a transverse minimally displaced patellar fracture on the right knee to the proximal aspect of the patella. No apparent effects to the total knee prosthesis at the present time.  -Advised patient to utilize brace when walking or standing to help reduce increased flexion and irritation to the patella and possible displacement of the fracture fragment.  -Advised for patient to follow-up in 2 to 3 months for repeat evaluation and repeat x-rays to ensure fragment stays stable. Significant improvements overall in the incision no drainage since initial visit        The orders below, if any, were placed during this visit:          ICD-10-CM    1. Closed nondisplaced transverse fracture of right patella, initial encounter  S82.034A XR KNEE RIGHT (3 VIEWS)   2. Status post total knee replacement, right  H56.290          ISHMAEL Fish

## 2021-01-20 DIAGNOSIS — Z96.652 STATUS POST TOTAL KNEE REPLACEMENT NOT USING CEMENT, LEFT: ICD-10-CM

## 2021-01-20 RX ORDER — TIZANIDINE 4 MG/1
TABLET ORAL
Qty: 90 TABLET | Refills: 1 | Status: SHIPPED | OUTPATIENT
Start: 2021-01-20 | End: 2021-03-24

## 2021-01-20 RX ORDER — ETODOLAC 400 MG/1
TABLET, FILM COATED ORAL
Qty: 60 TABLET | Refills: 3 | Status: SHIPPED | OUTPATIENT
Start: 2021-01-20

## 2021-01-20 RX ORDER — ROPINIROLE 1 MG/1
TABLET, FILM COATED ORAL
Qty: 90 TABLET | Refills: 3 | Status: SHIPPED | OUTPATIENT
Start: 2021-01-20 | End: 2021-05-18

## 2021-02-25 ENCOUNTER — TELEPHONE (OUTPATIENT)
Dept: ORTHOPEDIC SURGERY | Age: 56
End: 2021-02-25

## 2021-03-02 ENCOUNTER — OFFICE VISIT (OUTPATIENT)
Dept: ORTHOPEDIC SURGERY | Age: 56
End: 2021-03-02
Payer: COMMERCIAL

## 2021-03-02 VITALS
SYSTOLIC BLOOD PRESSURE: 139 MMHG | BODY MASS INDEX: 39.2 KG/M2 | HEIGHT: 71 IN | TEMPERATURE: 97.7 F | DIASTOLIC BLOOD PRESSURE: 99 MMHG | WEIGHT: 280 LBS | HEART RATE: 75 BPM

## 2021-03-02 DIAGNOSIS — M79.671 CHRONIC HEEL PAIN, RIGHT: ICD-10-CM

## 2021-03-02 DIAGNOSIS — M92.61 ACQUIRED HAGLUND'S DEFORMITY OF RIGHT HEEL: ICD-10-CM

## 2021-03-02 DIAGNOSIS — S82.034A CLOSED NONDISPLACED TRANSVERSE FRACTURE OF RIGHT PATELLA, INITIAL ENCOUNTER: ICD-10-CM

## 2021-03-02 DIAGNOSIS — Z96.652 STATUS POST TOTAL KNEE REPLACEMENT NOT USING CEMENT, LEFT: ICD-10-CM

## 2021-03-02 DIAGNOSIS — G89.29 CHRONIC HEEL PAIN, RIGHT: ICD-10-CM

## 2021-03-02 DIAGNOSIS — G89.29 CHRONIC PAIN OF RIGHT KNEE: Primary | ICD-10-CM

## 2021-03-02 DIAGNOSIS — M25.561 CHRONIC PAIN OF RIGHT KNEE: Primary | ICD-10-CM

## 2021-03-02 PROCEDURE — G8427 DOCREV CUR MEDS BY ELIG CLIN: HCPCS | Performed by: PHYSICIAN ASSISTANT

## 2021-03-02 PROCEDURE — 99214 OFFICE O/P EST MOD 30 MIN: CPT | Performed by: PHYSICIAN ASSISTANT

## 2021-03-02 PROCEDURE — G8417 CALC BMI ABV UP PARAM F/U: HCPCS | Performed by: PHYSICIAN ASSISTANT

## 2021-03-02 PROCEDURE — 1036F TOBACCO NON-USER: CPT | Performed by: PHYSICIAN ASSISTANT

## 2021-03-02 PROCEDURE — G8484 FLU IMMUNIZE NO ADMIN: HCPCS | Performed by: PHYSICIAN ASSISTANT

## 2021-03-02 PROCEDURE — 3017F COLORECTAL CA SCREEN DOC REV: CPT | Performed by: PHYSICIAN ASSISTANT

## 2021-03-02 NOTE — PROGRESS NOTES
Patient Name: Philip Elise MRN: <[id number]>   Age: 63 y.o. YOB: 1965   Sex: male         CHIEF COMPLAINT   Right patellar fracture  Chronic right heel pain    HISTORY OF PRESENT ILLNESS   Patient returns for follow-up regarding right knee patellar fracture  And evaluation for chronic right heel pain    The patient is doing very well in regards to the right knee. Beyond continued irritation to the right knee notes that the irritation is reducing its overall severity but notes that periods of higher levels of activity will increase irritation across the anterior aspect of the knee but the patella fracture occurred. Denies any issues with weakness fall buckling or give way of the knee and notes that he can pursue higher levels of activity but notes that the pain does increase with doing so. Rates his current pain level is 1 out of 10 in regards to the patellar fracture but states that it can flareup and aggravate more depending on the day and activity level rates it as a 4-5 out of 10 when it does. Notes is fairly well controlled with utilization of anti-inflammatories as well as rest ice and elevation. Denies numbness burning tingling radiating pains down the leg denies buckling or give way of the knee or leg denies utilization of any braces or assistive walking devices. And he is still good function and improving function in regards to his quadriceps and overall muscle strength. Patient notes that the right heel has been an ongoing issue for multiple years notes he has seen a foot and ankle specialist who has recommended removal of the Achilles tendon then removal of the spur and reattachment of the tendon as the only surgical outcome for this patient patient notes he is not wanting to have that aggressive or procedure done hopeful to have something at work and other consultations to maybe discuss less aggressive procedures that may help reduce his overall pain and function.   Patient notes he has not had cortisone injections into that area because he was told that this would not provide any benefit. Utilizes anti-inflammatories as well as topical anti-inflammatories to help control some of the pain. Utilization of wide shoes and loose fitting shoes to help reduce rubbing or friction across the back of the heel. Notes pain mostly on the posterior aspect of the heel and lateral aspects of the heel right at the base of the Achilles tendon insertion. Denies numbness burning tingling radiating pains down the foot and ankle denies any fall trauma or injury. Assessment      Review of Systems   Constitutional: Negative for chills and fever. HENT: Negative for nosebleeds. Eyes: Negative for double vision. Cardiovascular: Negative for chest pain. Gastrointestinal: Negative for abdominal pain. Musculoskeletal: Positive for joint pain and myalgias. Skin: Negative for rash. Neurological: Negative for seizures. Psychiatric/Behavioral: Negative for hallucinations. PHYSICAL EXAM     Vital Signs:   Vitals:    03/02/21 1029   BP: (!) 139/99   Pulse: 75   Temp:        Examination of the knee shows a well-healed midline incision. There is small swelling. There is no drainage. Range of motion is 0 to 120. On bilateral knees  Tenderness noted to the proximal aspect of the patella but mild in nature    The patient is neurovascularly intact. NEUROLOGICALLY: There is no evidence for sensory or motor deficits in the extremity. Coordination appears full with no spacticity or rigidity. Reflexes appear to be symmetric. Only mild tenderness noted on the proximal aspect of the patella. Great strength with flexion and extension of the knee. Distal circulation intact. No signs of RSD. Calf nontender. Negative delma's,  no signs of dvt    Hip exam shows full ROM with no focal pain or Instability. Leg lengths are normal. There is no Trendelenburg Gait.      Tenderness noted posterior lateral aspect of right heel and Achilles insertion noted palpated Ellie deformity and tenderness over that point. Range of motion well-maintained    RADIOLOGY   Xray   3 views of the right knee AP, lateral and sunrise views were performed and reviewed today revealing well-placed total knee prosthesis no evidence of loosening is noted old patellar fracture that has chip fragment proximal aspect of the patella that is displaced further since previous visit no further evidence or worsening of fracture. 3 views of the right ankle AP lateral or mortise views were performed and reviewed today revealing limited osteoarthritic changes noted throughout the ankle but noted Ellie deformity on posterior calcaneus with greatest point deformity noted within the Achilles tendon. X-ray from 12/29/2020  Three-view x-ray of the right knee AP, lateral and sunrise views were performed and reviewed today and my impression is: Well-placed total knee prosthesis no evidence of loosening. There is notation of a  transverse fracture of the proximal proximal aspect of the patella with minimal displacement noted since previous x-rays also noting bone growth across the Gap. No apparent effects to the patellar button. IMPRESSION   Status post right total knee  Status post right patellar fracture   Status post right heel Ellie deformity and chronic right heel pain      PLAN   -Patient is doing well status post patellar fracture we will continue to monitor to ensure no effect occurs to quadriceps insertion point and no effect occurs along the patellar button on total knee prosthesis.  -In regards to Ellie deformity surgical interventions could be performed to remove that deformity but with its embedment within the Achilles tendon likely necessary to remove the Achilles tendon which includes a lengthy and arduous recovery period which would not recommended.   There is potential to possibly split the Achilles tendon posteriorly and removed portions that way which would reduce recovery time but discussed with patient that moving forward with PRP or stem cell related Biologics would be more beneficial and less affecting his overall quality of life and recovery. -Advised utilization of heel lift on the right shoe to help reduce pressure across the Achilles tendon  -Gave patient shoe lift at today's visit  -Advised patient to utilize brace when walking or standing to help reduce increased flexion and irritation to the patella and possible displacement of the fracture fragment.  -Advised for patient to follow-up in 2 to 3 months for repeat evaluation and repeat x-rays to ensure fragment stays stable. Significant improvements overall in the incision no drainage since initial visit  -Advised for continued utilization of anti-inflammatories and topical anti-inflammatories to help control pain. The orders below, if any, were placed during this visit:          ICD-10-CM    1. Chronic pain of right knee  M25.561 XR KNEE RIGHT (3 VIEWS)    G89.29    2. Status post total knee replacement not using cement, left  Z96.652    3. Closed nondisplaced transverse fracture of right patella, initial encounter  S82.034A XR KNEE RIGHT (3 VIEWS)   4. Chronic heel pain, right  M79.671 XR ANKLE RIGHT (MIN 3 VIEWS)    G89.29    5.  Acquired Ellie's deformity of right heel  M92.61          Justo Pickens, [address] [place]

## 2021-03-24 DIAGNOSIS — Z96.652 STATUS POST TOTAL KNEE REPLACEMENT NOT USING CEMENT, LEFT: ICD-10-CM

## 2021-03-24 RX ORDER — TIZANIDINE 4 MG/1
TABLET ORAL
Qty: 90 TABLET | Refills: 1 | Status: SHIPPED | OUTPATIENT
Start: 2021-03-24 | End: 2021-05-18

## 2021-05-11 ENCOUNTER — OFFICE VISIT (OUTPATIENT)
Dept: ORTHOPEDIC SURGERY | Age: 56
End: 2021-05-11
Payer: COMMERCIAL

## 2021-05-11 VITALS
SYSTOLIC BLOOD PRESSURE: 123 MMHG | BODY MASS INDEX: 39.2 KG/M2 | DIASTOLIC BLOOD PRESSURE: 86 MMHG | HEIGHT: 71 IN | WEIGHT: 280 LBS | HEART RATE: 74 BPM

## 2021-05-11 DIAGNOSIS — Z96.651 STATUS POST TOTAL KNEE REPLACEMENT, RIGHT: ICD-10-CM

## 2021-05-11 DIAGNOSIS — M25.561 CHRONIC PAIN OF RIGHT KNEE: Primary | ICD-10-CM

## 2021-05-11 DIAGNOSIS — G89.29 CHRONIC PAIN OF RIGHT KNEE: Primary | ICD-10-CM

## 2021-05-11 DIAGNOSIS — S82.034A CLOSED NONDISPLACED TRANSVERSE FRACTURE OF RIGHT PATELLA, INITIAL ENCOUNTER: ICD-10-CM

## 2021-05-11 PROCEDURE — 3017F COLORECTAL CA SCREEN DOC REV: CPT | Performed by: PHYSICIAN ASSISTANT

## 2021-05-11 PROCEDURE — G8427 DOCREV CUR MEDS BY ELIG CLIN: HCPCS | Performed by: PHYSICIAN ASSISTANT

## 2021-05-11 PROCEDURE — G8417 CALC BMI ABV UP PARAM F/U: HCPCS | Performed by: PHYSICIAN ASSISTANT

## 2021-05-11 PROCEDURE — 1036F TOBACCO NON-USER: CPT | Performed by: PHYSICIAN ASSISTANT

## 2021-05-11 PROCEDURE — 99213 OFFICE O/P EST LOW 20 MIN: CPT | Performed by: PHYSICIAN ASSISTANT

## 2021-05-11 NOTE — PROGRESS NOTES
Patient Name: Aravind Johnson MRN: [id number]   Age: 63 y.o. YOB: 1965   Sex: male         CHIEF COMPLAINT   Right patellar fracture  Chronic right heel pain    HISTORY OF PRESENT ILLNESS   Patient returns for follow-up regarding right knee patellar fracture  And evaluation for chronic right heel pain    The patient is doing very well in regards to the right knee. Beyond continued irritation to the right knee notes that the irritation is reducing its overall severity but notes that periods of increased levels of flexion night and his grandsons baseball games where he has to sit on the short bleachers or kneeling or squatting down tend to bother it more. But walking does not cause any issues with periods of standing does not cause any issues. Stable amount of irritation across the anterior aspect of the knee but the patella fracture occurred rates pain is a 3 out of 10. Denies any issues with weakness fall buckling or give way of the knee and notes that he can pursue higher levels of activity but notes that the pain does increase with doing so. Notes is fairly well controlled with utilization of anti-inflammatories as well as rest ice and elevation. Denies numbness burning tingling radiating pains down the leg denies buckling or give way of the knee or leg denies utilization of any braces or assistive walking devices. And he is still good function and improving function in regards to his quadriceps and overall muscle strength.       Patient notes that the right heel has been an ongoing issue for multiple years notes he has seen a foot and ankle specialist who has recommended removal of the Achilles tendon then removal of the spur and reattachment of the tendon as the only surgical outcome for this patient patient notes he is not wanting to have that aggressive or procedure done hopeful to have something at work and other consultations to maybe discuss less aggressive procedures that may help reduce his overall pain and function. Patient notes he has not had cortisone injections into that area because he was told that this would not provide any benefit. Utilizes anti-inflammatories as well as topical anti-inflammatories to help control some of the pain. Utilization of wide shoes and loose fitting shoes to help reduce rubbing or friction across the back of the heel. Notes pain mostly on the posterior aspect of the heel and lateral aspects of the heel right at the base of the Achilles tendon insertion. Denies numbness burning tingling radiating pains down the foot and ankle denies any fall trauma or injury. Assessment      Review of Systems   Constitutional: Negative for chills and fever. HENT: Negative for nosebleeds. Eyes: Negative for double vision. Cardiovascular: Negative for chest pain. Gastrointestinal: Negative for abdominal pain. Musculoskeletal: Positive for joint pain and myalgias. Skin: Negative for rash. Neurological: Negative for seizures. Psychiatric/Behavioral: Negative for hallucinations. PHYSICAL EXAM     Vital Signs:   Vitals:    05/11/21 0846   BP: 123/86   Pulse: 74       Examination of the knee shows a well-healed midline incision. There is small swelling. There is no drainage. Range of motion is 0 to 120. On bilateral knees  Tenderness noted to the proximal aspect of the patella but mild in nature    The patient is neurovascularly intact. NEUROLOGICALLY: There is no evidence for sensory or motor deficits in the extremity. Coordination appears full with no spacticity or rigidity. Reflexes appear to be symmetric. Only mild tenderness noted on the proximal aspect of the patella. Great strength with flexion and extension of the knee. Distal circulation intact. No signs of RSD. Calf nontender. Negative delma's,  no signs of dvt    Hip exam shows full ROM with no focal pain or Instability.  Leg lengths are normal. There is no Trendelenburg Gait.     Tenderness noted posterior lateral aspect of right heel and Achilles insertion noted palpated Ellie deformity and tenderness over that point. Range of motion well-maintained    RADIOLOGY   Xray   3 views of the right knee AP, lateral and sunrise views were performed and reviewed today revealing well-placed total knee prosthesis no evidence of loosening is noted old patellar fracture that has chip fragment proximal aspect of the patella that is displaced further since previous visit no further evidence or worsening of fracture. Noted bone healing/bridging of that fracture site    3 views of the right ankle AP lateral or mortise views were performed on 3/2/2021 and reviewed today revealing limited osteoarthritic changes noted throughout the ankle but noted Ellie deformity on posterior calcaneus with greatest point deformity noted within the Achilles tendon. X-ray from 12/29/2020  Three-view x-ray of the right knee AP, lateral and sunrise views were performed and reviewed today and my impression is: Well-placed total knee prosthesis no evidence of loosening. There is notation of a  transverse fracture of the proximal proximal aspect of the patella with minimal displacement noted since previous x-rays also noting bone growth across the Gap. No apparent effects to the patellar button.     IMPRESSION   Status post right total knee  Status post right patellar fracture   Status post right heel Ellie deformity and chronic right heel pain      PLAN   -Patient is doing well status post patellar fracture we will continue to monitor to ensure no effect occurs to quadriceps insertion point and no effect occurs along the patellar button on total knee prosthesis.  -In regards to Ellie deformity surgical interventions could be performed to remove that deformity but with its embedment within the Achilles tendon likely necessary to remove the Achilles tendon which includes a lengthy and arduous recovery period which would not recommended. There is potential to possibly split the Achilles tendon posteriorly and removed portions that way which would reduce recovery time but discussed with patient that moving forward with PRP or stem cell related Biologics would be more beneficial and less affecting his overall quality of life and recovery. -Advised utilization of heel lift on the right shoe to help reduce pressure across the Achilles tendon  -Gave patient shoe lift at today's visit  -Advised patient to utilize brace when walking or standing to help reduce increased flexion and irritation to the patella and possible displacement of the fracture fragment.  -Advised patient to utilize Cho-Pat knee strap brace in pursuing activities of increased need for flexion.  -Advised for patient to follow-up in 6 months for repeat evaluation and repeat x-rays to ensure fragment stays stable. Significant improvements overall in the incision no drainage since initial visit  -Advised for continued utilization of anti-inflammatories and topical anti-inflammatories to help control pain. The orders below, if any, were placed during this visit:          ICD-10-CM    1. Chronic pain of right knee  M25.561     G89.29    2.  Closed nondisplaced transverse fracture of right patella, initial encounter  S82.034A XR KNEE RIGHT (3 VIEWS)   3. Status post total knee replacement, right  P92.555          ISHMAEL Hernandes

## 2021-05-18 DIAGNOSIS — Z96.652 STATUS POST TOTAL KNEE REPLACEMENT NOT USING CEMENT, LEFT: ICD-10-CM

## 2021-05-18 RX ORDER — TIZANIDINE 4 MG/1
TABLET ORAL
Qty: 90 TABLET | Refills: 1 | Status: SHIPPED | OUTPATIENT
Start: 2021-05-18

## 2021-05-18 RX ORDER — ROPINIROLE 1 MG/1
TABLET, FILM COATED ORAL
Qty: 90 TABLET | Refills: 3 | Status: SHIPPED | OUTPATIENT
Start: 2021-05-18

## 2021-10-15 ENCOUNTER — CLINICAL DOCUMENTATION (OUTPATIENT)
Dept: OTHER | Age: 56
End: 2021-10-15

## (undated) DEVICE — E-Z CLEAN, NON-STICK, PTFE COATED, ELECTROSURGICAL BLADE ELECTRODE, 2.5 INCH (6.35 CM): Brand: EZ CLEAN

## (undated) DEVICE — GOWN,SIRUS,POLYRNF,BRTHSLV,XL,30/CS: Brand: MEDLINE

## (undated) DEVICE — BIPOLAR SEALER 23-112-1 AQM 6.0: Brand: AQUAMANTYS ®

## (undated) DEVICE — 3M™ STERI-DRAPE™ INSTRUMENT POUCH 1018: Brand: STERI-DRAPE™

## (undated) DEVICE — PIN BNE FIX TEMP L140MM DIA4MM MAKO

## (undated) DEVICE — SPLINT ORTH 22IN KNEE BASIC

## (undated) DEVICE — KIT DRP FOR RIO ROBOTIC ARM ASST SYS

## (undated) DEVICE — GLOVE SURG SZ 75 L12IN FNGR THK94MIL TRNSLUC YEL LTX

## (undated) DEVICE — COVER LT HNDL BLU PLAS

## (undated) DEVICE — SURE SET-DOUBLE BASIN-LF: Brand: MEDLINE INDUSTRIES, INC.

## (undated) DEVICE — PIN BONE FIX L110MM DIA3.2MM

## (undated) DEVICE — ELECTROSURGICAL PENCIL ROCKER SWITCH NON COATED BLADE ELECTRODE 10 FT (3 M) CORD HOLSTER: Brand: MEGADYNE

## (undated) DEVICE — SUTURE ETHBND EXCEL SZ 0 L18IN NONABSORBABLE GRN L36MM CT-1 CX21D

## (undated) DEVICE — GLOVE ORANGE PI 8   MSG9080

## (undated) DEVICE — COUNTER NDL 40 COUNT HLD 70 NUM FOAM BLK SGL MAG W BLDE REMV

## (undated) DEVICE — SYSTEM SKIN CLSR 22CM DERMBND PRINEO

## (undated) DEVICE — 3 BONE CEMENT MIXER: Brand: MIXEVAC

## (undated) DEVICE — DECANTER BAG 9": Brand: MEDLINE INDUSTRIES, INC.

## (undated) DEVICE — ORTHO PRE OP PACK: Brand: MEDLINE INDUSTRIES, INC.

## (undated) DEVICE — BLANKET WRM W29.9XL79.1IN UP BODY FORC AIR MISTRAL-AIR

## (undated) DEVICE — SUTURE MCRYL SZ 0 L18IN ABSRB VLT L36MM CT-1 1/2 CIR Y740D

## (undated) DEVICE — 3M™ COBAN™ NL STERILE NON-LATEX SELF-ADHERENT WRAP, 2086S, 6 IN X 5 YD (15 CM X 4,5 M), 12 ROLLS/CASE: Brand: 3M™ COBAN™

## (undated) DEVICE — SUTURE MCRYL SZ 2-0 L18IN ABSRB VLT L36MM CT-1 1/2 CIR Y739D

## (undated) DEVICE — KIT INT FIX FEM TIB CKPT MAKOPLASTY

## (undated) DEVICE — NEEDLE HYPO 22GA L1.5IN BLK POLYPR HUB S STL REG BVL STR

## (undated) DEVICE — 3M™ TEGADERM™ TRANSPARENT FILM DRESSING FRAME STYLE, 1627, 4 IN X 10 IN (10 CM X 25 CM), 20/CT 4CT/CASE: Brand: 3M™ TEGADERM™

## (undated) DEVICE — Z INACTIVE USE 2660664 SOLUTION IRRIG 3000ML 0.9% SOD CHL USP UROMATIC PLAS CONT

## (undated) DEVICE — PACK PROCEDURE SURG TOTAL KNEE

## (undated) DEVICE — MARKER SURG SKIN UTIL BLK REG TIP NONSMEARING W/ 6IN RUL

## (undated) DEVICE — GOWN,SIRUS,POLYRNF,BRTHSLV,XLN/XL,20/CS: Brand: MEDLINE

## (undated) DEVICE — SOLUTION IV 1000ML 0.9% SOD CHL

## (undated) DEVICE — INTENDED FOR TISSUE SEPARATION, AND OTHER PROCEDURES THAT REQUIRE A SHARP SURGICAL BLADE TO PUNCTURE OR CUT.: Brand: BARD-PARKER ® CARBON RIB-BACK BLADES

## (undated) DEVICE — SPONGE,LAP,18"X18",DLX,XR,ST,5/PK,40/PK: Brand: MEDLINE

## (undated) DEVICE — APPLICATOR MEDICATED 26 CC SOLUTION HI LT ORNG CHLORAPREP

## (undated) DEVICE — ZINACTIVE USE 2539607 PIN FIX L3.5IN DIA1/8IN LNG HDLSS FOR MIS KNEE JT REPL

## (undated) DEVICE — HANDPIECE SUCTION TUBING INTERPULSE 10FT

## (undated) DEVICE — Z CONVERTED USE 2271043 CONTAINER SPEC COLL 4OZ SCR ON LID PEEL PCH

## (undated) DEVICE — BANDAGE COBAN 6 IN WND 6INX5YD FOAM

## (undated) DEVICE — 2108 SERIES SAGITTAL BLADE (19.5 X 1.27 X 90.0MM)

## (undated) DEVICE — BLADE SURG SAW STD S STL OSC W/ SERR EDGE DISP

## (undated) DEVICE — PIN GUIDE ORTHO 3.2X89MM FLTD DISP PK OF 4 PER PATIENT

## (undated) DEVICE — BOOT POS LEG DEMAYO

## (undated) DEVICE — 3M™ COBAN™ NL STERILE NON-LATEX SELF-ADHERENT WRAP, 2084S, 4 IN X 5 YD (10 CM X 4,5 M), 18 ROLLS/CASE: Brand: 3M™ COBAN™

## (undated) DEVICE — DRESSING THERABOND 3D ANTIMIC CNTCT SYS 15INCHX10INCH

## (undated) DEVICE — KIT TRK KNEE PROC VIZADISC

## (undated) DEVICE — SOLUTION IV 250ML 0.9% SOD CHL PH 5 INJ USP VIAFLX PLAS

## (undated) DEVICE — INTENDED USE FOR SURGICAL MARKING ON INTACT SKIN, ALSO PROVIDES A PERMANENT METHOD OF IDENTIFYING OBJECTS IN THE OPERATING ROOM: Brand: WRITESITE® PLUS MINI PREP RESISTANT MARKER

## (undated) DEVICE — PEEL-AWAY HOOD: Brand: FLYTE, SURGICOOL

## (undated) DEVICE — 3M™ STERI-DRAPE™ U-DRAPE 1015: Brand: STERI-DRAPE™

## (undated) DEVICE — PLATE ES AD W 9FT CRD 2

## (undated) DEVICE — STANDARD HYPODERMIC NEEDLE,POLYPROPYLENE HUB: Brand: MONOJECT

## (undated) DEVICE — GLOVE SURG SZ 8 L12IN FNGR THK79MIL GRN LTX FREE

## (undated) DEVICE — GARMENT,MEDLINE,DVT,INT,CALF,MED, GEN2: Brand: MEDLINE

## (undated) DEVICE — HIGH FLOW TIP

## (undated) DEVICE — CORD RETRCT SIL

## (undated) DEVICE — JEWISH HOSPITAL TURNOVER KIT: Brand: MEDLINE INDUSTRIES, INC.

## (undated) DEVICE — PIN BNE FIX L110MM DIA32MM

## (undated) DEVICE — SUTURE STRATAFIX SPRL SZ 1 L14IN ABSRB VLT L48CM CTX 1/2 SXPD2B405

## (undated) DEVICE — KNEE HOLDER DISPOSABLE LINER: Brand: ALVARADO®  KNEE SUPPORT

## (undated) DEVICE — CUFF RESTRN WRST OR ANK 45FT AD FOAM

## (undated) DEVICE — UNDERGLOVE SURG SZ 8 BLU LTX FREE SYN POLYISOPRENE POLYMER

## (undated) DEVICE — 450 ML BOTTLE OF 0.05% CHLORHEXIDINE GLUCONATE IN 99.95% STERILE WATER FOR IRRIGATION, USP AND APPLICATOR.: Brand: IRRISEPT ANTIMICROBIAL WOUND LAVAGE

## (undated) DEVICE — TOWEL,OR,DSP,ST,BLUE,DLX,8/PK,10PK/CS: Brand: MEDLINE

## (undated) DEVICE — GLOVE SURG SZ 75 CRM LTX FREE POLYISOPRENE POLYMER BEAD ANTI

## (undated) DEVICE — SUTURE MCRYL SZ 4-0 L27IN ABSRB UD L19MM PS-2 1/2 CIR PRIM Y426H

## (undated) DEVICE — SOLUTION IV IRRIG WATER 1000ML POUR BRL 2F7114

## (undated) DEVICE — YANKAUER,OPEN TIP,W/O VENT,STERILE: Brand: MEDLINE INDUSTRIES, INC.

## (undated) DEVICE — CONTAINER,SPECIMEN,PNEU TUBE,3OZ,OR STRL: Brand: MEDLINE